# Patient Record
Sex: FEMALE | Race: BLACK OR AFRICAN AMERICAN | NOT HISPANIC OR LATINO | Employment: PART TIME | URBAN - METROPOLITAN AREA
[De-identification: names, ages, dates, MRNs, and addresses within clinical notes are randomized per-mention and may not be internally consistent; named-entity substitution may affect disease eponyms.]

---

## 2018-03-25 ENCOUNTER — HOSPITAL ENCOUNTER (EMERGENCY)
Facility: OTHER | Age: 58
Discharge: HOME | End: 2018-03-25
Attending: EMERGENCY MEDICINE
Payer: MEDICAID

## 2018-03-25 VITALS
DIASTOLIC BLOOD PRESSURE: 88 MMHG | HEART RATE: 112 BPM | TEMPERATURE: 99 F | OXYGEN SATURATION: 98 % | HEIGHT: 62 IN | RESPIRATION RATE: 17 BRPM | BODY MASS INDEX: 36.25 KG/M2 | WEIGHT: 197 LBS | SYSTOLIC BLOOD PRESSURE: 151 MMHG

## 2018-03-25 DIAGNOSIS — L03.115 CELLULITIS OF RIGHT FOOT: Primary | ICD-10-CM

## 2018-03-25 DIAGNOSIS — B35.3 TINEA PEDIS, UNSPECIFIED LATERALITY: ICD-10-CM

## 2018-03-25 DIAGNOSIS — I10 HYPERTENSION, UNSPECIFIED TYPE: ICD-10-CM

## 2018-03-25 DIAGNOSIS — L53.9 REDNESS: ICD-10-CM

## 2018-03-25 DIAGNOSIS — R73.9 HYPERGLYCEMIA: ICD-10-CM

## 2018-03-25 LAB
ALBUMIN SERPL-MCNC: 3.4 G/DL (ref 3.3–5.5)
ALP SERPL-CCNC: 86 U/L (ref 42–141)
BASOPHILS # BLD AUTO: 0.06 K/UL
BASOPHILS NFR BLD: 0.5 %
BILIRUB SERPL-MCNC: 0.4 MG/DL (ref 0.2–1.6)
BUN SERPL-MCNC: 10 MG/DL (ref 7–22)
CALCIUM SERPL-MCNC: 9.3 MG/DL (ref 8–10.3)
CHLORIDE SERPL-SCNC: 104 MMOL/L (ref 98–108)
CREAT SERPL-MCNC: 0.9 MG/DL (ref 0.6–1.2)
DIFFERENTIAL METHOD: ABNORMAL
EOSINOPHIL # BLD AUTO: 0.3 K/UL
EOSINOPHIL NFR BLD: 2.8 %
ERYTHROCYTE [DISTWIDTH] IN BLOOD BY AUTOMATED COUNT: 13.9 %
GLUCOSE SERPL-MCNC: 183 MG/DL (ref 73–118)
HCT VFR BLD AUTO: 33.3 %
HGB BLD-MCNC: 10.9 G/DL
LYMPHOCYTES # BLD AUTO: 0.7 K/UL
LYMPHOCYTES NFR BLD: 6.7 %
MCH RBC QN AUTO: 29.1 PG
MCHC RBC AUTO-ENTMCNC: 32.7 G/DL
MCV RBC AUTO: 89 FL
MONOCYTES # BLD AUTO: 0.8 K/UL
MONOCYTES NFR BLD: 7.3 %
NEUTROPHILS # BLD AUTO: 9.1 K/UL
NEUTROPHILS NFR BLD: 82.5 %
PLATELET # BLD AUTO: 417 K/UL
PMV BLD AUTO: 9.2 FL
POC ALT (SGPT): 21 U/L (ref 10–47)
POC AST (SGOT): 30 U/L (ref 11–38)
POC TCO2: 25 MMOL/L (ref 18–33)
POTASSIUM BLD-SCNC: 3.7 MMOL/L (ref 3.6–5.1)
PROTEIN, POC: 7.7 G/DL (ref 6.4–8.1)
RBC # BLD AUTO: 3.74 M/UL
SODIUM BLD-SCNC: 144 MMOL/L (ref 128–145)
URATE SERPL-MCNC: 5 MG/DL
WBC # BLD AUTO: 11 K/UL

## 2018-03-25 PROCEDURE — 80053 COMPREHEN METABOLIC PANEL: CPT

## 2018-03-25 PROCEDURE — 85025 COMPLETE CBC W/AUTO DIFF WBC: CPT

## 2018-03-25 PROCEDURE — 99284 EMERGENCY DEPT VISIT MOD MDM: CPT | Mod: 25

## 2018-03-25 PROCEDURE — S0077 INJECTION, CLINDAMYCIN PHOSP: HCPCS | Performed by: EMERGENCY MEDICINE

## 2018-03-25 PROCEDURE — 63600175 PHARM REV CODE 636 W HCPCS: Performed by: EMERGENCY MEDICINE

## 2018-03-25 PROCEDURE — 96365 THER/PROPH/DIAG IV INF INIT: CPT

## 2018-03-25 PROCEDURE — 96361 HYDRATE IV INFUSION ADD-ON: CPT

## 2018-03-25 PROCEDURE — 25000003 PHARM REV CODE 250: Performed by: EMERGENCY MEDICINE

## 2018-03-25 PROCEDURE — 96375 TX/PRO/DX INJ NEW DRUG ADDON: CPT

## 2018-03-25 PROCEDURE — 84550 ASSAY OF BLOOD/URIC ACID: CPT

## 2018-03-25 RX ORDER — INDOMETHACIN 25 MG/1
50 CAPSULE ORAL
Qty: 30 CAPSULE | Refills: 0 | Status: SHIPPED | OUTPATIENT
Start: 2018-03-25 | End: 2020-12-07

## 2018-03-25 RX ORDER — CLINDAMYCIN PHOSPHATE 900 MG/50ML
900 INJECTION, SOLUTION INTRAVENOUS
Status: COMPLETED | OUTPATIENT
Start: 2018-03-25 | End: 2018-03-25

## 2018-03-25 RX ORDER — HYDROCODONE BITARTRATE AND ACETAMINOPHEN 5; 325 MG/1; MG/1
1 TABLET ORAL EVERY 4 HOURS PRN
Qty: 10 TABLET | Refills: 0 | Status: SHIPPED | OUTPATIENT
Start: 2018-03-25 | End: 2018-04-04

## 2018-03-25 RX ORDER — CLINDAMYCIN HYDROCHLORIDE 300 MG/1
300 CAPSULE ORAL 3 TIMES DAILY
Qty: 30 CAPSULE | Refills: 0 | Status: SHIPPED | OUTPATIENT
Start: 2018-03-25 | End: 2021-03-30

## 2018-03-25 RX ORDER — KETOCONAZOLE 20 MG/G
CREAM TOPICAL 2 TIMES DAILY
Qty: 30 G | Refills: 1 | Status: SHIPPED | OUTPATIENT
Start: 2018-03-25 | End: 2021-06-16

## 2018-03-25 RX ORDER — KETOROLAC TROMETHAMINE 30 MG/ML
30 INJECTION, SOLUTION INTRAMUSCULAR; INTRAVENOUS
Status: COMPLETED | OUTPATIENT
Start: 2018-03-25 | End: 2018-03-25

## 2018-03-25 RX ADMIN — CLINDAMYCIN IN 5 PERCENT DEXTROSE 900 MG: 18 INJECTION, SOLUTION INTRAVENOUS at 08:03

## 2018-03-25 RX ADMIN — SODIUM CHLORIDE 1000 ML: 0.9 INJECTION, SOLUTION INTRAVENOUS at 08:03

## 2018-03-25 RX ADMIN — KETOROLAC TROMETHAMINE 30 MG: 30 INJECTION, SOLUTION INTRAMUSCULAR at 08:03

## 2018-03-25 NOTE — ED NOTES
Pt reports R big toe infection, pain, swelling.  Skin breakdown almost blisterlike noted to R big toe.

## 2018-03-25 NOTE — ED PROVIDER NOTES
Encounter Date: 3/25/2018       History     Chief Complaint   Patient presents with    Foot Problem     pt report reddness ans swelling to right great toe since fri, denies injury     CC: right foot pain and redness  57-year-old noted redness and swelling to the dorsum of her right foot 2 days ago.  She denies trauma.  She has 10 out of 10 pain.  She's been taking ibuprofen without improvement.  Patient denies fever.  She has a history of arthritis reports chronic pain to her right shoulder.   The pain worsens when she walks and she is limping      The history is provided by the patient.     Review of patient's allergies indicates:  No Known Allergies  Past Medical History:   Diagnosis Date    Arthritis     High cholesterol     Hypertension      Past Surgical History:   Procedure Laterality Date    c cestion      KNEE ARTHROPLASTY       History reviewed. No pertinent family history.  Social History   Substance Use Topics    Smoking status: Current Every Day Smoker     Packs/day: 0.50    Smokeless tobacco: Never Used    Alcohol use Not on file     Review of Systems   Constitutional: Negative for fever.   Respiratory: Negative for shortness of breath.    Cardiovascular: Negative for chest pain.   Musculoskeletal: Positive for arthralgias (right shoulder), gait problem and joint swelling.   Skin: Positive for color change.   Neurological: Negative for weakness and numbness.       Physical Exam     Initial Vitals [03/25/18 0723]   BP Pulse Resp Temp SpO2   (!) 195/95 (!) 120 20 99.1 °F (37.3 °C) 99 %      MAP       128.33         Physical Exam    Nursing note and vitals reviewed.  Constitutional: She appears well-developed and well-nourished.   HENT:   Head: Normocephalic and atraumatic.   Eyes: Conjunctivae and EOM are normal. Pupils are equal, round, and reactive to light.   Neck: Normal range of motion. Neck supple.   Cardiovascular: Regular rhythm. Tachycardia present.    Pulses:       Posterior tibial  pulses are 3+ on the right side.   Pulmonary/Chest: Breath sounds normal.   Abdominal: Soft. There is no tenderness. There is no rebound and no guarding.   Musculoskeletal: Normal range of motion.        Right shoulder: She exhibits normal range of motion, normal pulse and normal strength.        Arms:       Feet:    Onychomycosis right great toe   Neurological: She is alert and oriented to person, place, and time. She has normal strength.   Skin: Skin is warm and dry.   Psychiatric: She has a normal mood and affect.         ED Course   Procedures  Labs Reviewed   POCT CMP - Abnormal; Notable for the following:        Result Value    POC Glucose 183 (*)     All other components within normal limits   URIC ACID   CBC W/ AUTO DIFFERENTIAL   POCT CBC   POCT CMP             Medical Decision Making:   Initial Assessment:   57-year-old presents with redness and swelling to the dorsum of her right foot.  On exam patient does have redness, tenderness and edema to the foot.  She also has evidence of athlete's foot between her Tays.  She has onychomycosis to the right great toe.  She has 2+ dorsalis pedis  Differential Diagnosis:   Gout, cellulitis, tinea pedis with superimposed bacterial infection  ED Management:  CBC: Normal white blood cell count, blood sugar 183 (unknown diabetic.)  Patient admits that she is on prednisone for arthritis.  This is likely the etiology of the hyperglycemia X-ray shows no acute findings.  Patient was given clindamycin IV in the ED.  She was also given Toradol.  Patient will be discharged on clindamycin, Nizoral  cream and hydrocodone.  She will follow up with podiatry.  Uric acid was normal                      Clinical Impression:   The primary encounter diagnosis was Cellulitis of right foot. Diagnoses of Redness, Hypertension, unspecified type, Hyperglycemia, and Tinea pedis, unspecified laterality were also pertinent to this visit.                           Tabatha Neumann MD  03/25/18  1917

## 2018-05-18 ENCOUNTER — TELEPHONE (OUTPATIENT)
Dept: ORTHOPEDICS | Facility: CLINIC | Age: 58
End: 2018-05-18

## 2018-05-18 NOTE — TELEPHONE ENCOUNTER
Spoke with patient to schedule appointment. Patient was made aware of date, time, and location. Verbalized understanding.

## 2018-05-21 ENCOUNTER — HOSPITAL ENCOUNTER (EMERGENCY)
Facility: HOSPITAL | Age: 58
Discharge: HOME | End: 2018-05-21
Attending: EMERGENCY MEDICINE
Payer: MEDICAID

## 2018-05-21 VITALS
TEMPERATURE: 99 F | WEIGHT: 192 LBS | BODY MASS INDEX: 36.25 KG/M2 | SYSTOLIC BLOOD PRESSURE: 161 MMHG | DIASTOLIC BLOOD PRESSURE: 85 MMHG | HEART RATE: 99 BPM | HEIGHT: 61 IN | OXYGEN SATURATION: 99 % | RESPIRATION RATE: 18 BRPM

## 2018-05-21 DIAGNOSIS — R52 PAIN: ICD-10-CM

## 2018-05-21 DIAGNOSIS — M17.11 OSTEOARTHRITIS OF RIGHT KNEE, UNSPECIFIED OSTEOARTHRITIS TYPE: Primary | ICD-10-CM

## 2018-05-21 LAB — POCT GLUCOSE: 115 MG/DL (ref 70–110)

## 2018-05-21 PROCEDURE — 63600175 PHARM REV CODE 636 W HCPCS: Performed by: EMERGENCY MEDICINE

## 2018-05-21 PROCEDURE — 96372 THER/PROPH/DIAG INJ SC/IM: CPT

## 2018-05-21 PROCEDURE — 99284 EMERGENCY DEPT VISIT MOD MDM: CPT | Mod: 25

## 2018-05-21 RX ORDER — DICLOFENAC SODIUM 10 MG/G
2 GEL TOPICAL 4 TIMES DAILY
Qty: 1 TUBE | Refills: 0 | Status: SHIPPED | OUTPATIENT
Start: 2018-05-21 | End: 2018-06-23

## 2018-05-21 RX ORDER — KETOROLAC TROMETHAMINE 30 MG/ML
30 INJECTION, SOLUTION INTRAMUSCULAR; INTRAVENOUS
Status: COMPLETED | OUTPATIENT
Start: 2018-05-21 | End: 2018-05-21

## 2018-05-21 RX ORDER — NAPROXEN 375 MG/1
375 TABLET ORAL 2 TIMES DAILY PRN
Qty: 20 TABLET | Refills: 0 | Status: SHIPPED | OUTPATIENT
Start: 2018-05-21 | End: 2018-05-26

## 2018-05-21 RX ADMIN — KETOROLAC TROMETHAMINE 30 MG: 30 INJECTION INTRAMUSCULAR; INTRAVENOUS at 02:05

## 2018-05-21 NOTE — ED PROVIDER NOTES
Encounter Date: 5/21/2018       History     Chief Complaint   Patient presents with    Right knee     Hx of arthritis. Symptoms x 1 week     49-year-old female presents with achy right knee pain. Patient states the pain has been getting much worse over the last week.  Patient has been taking ibuprofen at night.  Patient states pain does not radiate.  Patient states she was diagnosed with arthritis of her knee 6 months ago.  She is awaiting orthopedic evaluation in 1 month.  Patient states the pain is waking her at night.  Ibuprofen is not helping.  Patient denies any new injuries.  Patient denies numbness tingling weakness.          Review of patient's allergies indicates:  No Known Allergies  Past Medical History:   Diagnosis Date    Arthritis     Diabetes mellitus     High cholesterol     Hypertension      Past Surgical History:   Procedure Laterality Date    c cestion      KNEE ARTHROPLASTY       History reviewed. No pertinent family history.  Social History   Substance Use Topics    Smoking status: Current Every Day Smoker     Packs/day: 0.50    Smokeless tobacco: Never Used    Alcohol use Not on file     Review of Systems   Constitutional: Negative for fever.   HENT: Negative for sore throat.    Respiratory: Negative for shortness of breath.    Cardiovascular: Negative for chest pain.   Gastrointestinal: Negative for nausea.   Genitourinary: Negative for dysuria.   Musculoskeletal: Positive for arthralgias. Negative for back pain.   Skin: Negative for rash.   Neurological: Negative for weakness.   Hematological: Does not bruise/bleed easily.   All other systems reviewed and are negative.      Physical Exam     Initial Vitals [05/21/18 1414]   BP Pulse Resp Temp SpO2   (!) 161/85 99 18 98.5 °F (36.9 °C) 99 %      MAP       110.33         Physical Exam    Nursing note and vitals reviewed.  Constitutional: She appears well-developed and well-nourished.   HENT:   Head: Normocephalic and atraumatic.    Right Ear: External ear normal.   Left Ear: External ear normal.   Nose: Nose normal.   Eyes: Conjunctivae and EOM are normal. Pupils are equal, round, and reactive to light.   Neck: Normal range of motion. Neck supple.   Cardiovascular: Normal rate, regular rhythm and intact distal pulses.   Pulmonary/Chest: Breath sounds normal. No respiratory distress.   Musculoskeletal: She exhibits tenderness. She exhibits no edema.        Right hip: Normal. She exhibits normal range of motion, normal strength, no tenderness and no bony tenderness.        Left hip: Normal. She exhibits normal range of motion, normal strength, no tenderness and no bony tenderness.        Right knee: She exhibits normal range of motion, no swelling, no effusion, no ecchymosis and no deformity. Tenderness found. Medial joint line tenderness noted. No lateral joint line tenderness noted.        Left knee: Normal. She exhibits normal range of motion and no swelling. No tenderness found. No medial joint line and no lateral joint line tenderness noted.        Right ankle: Normal. She exhibits normal range of motion and no swelling. No tenderness. No lateral malleolus and no medial malleolus tenderness found.        Left ankle: She exhibits normal range of motion and no swelling. No tenderness. No lateral malleolus and no medial malleolus tenderness found.        Legs:  Neurological: She is alert and oriented to person, place, and time. She has normal strength. She displays normal reflexes. No sensory deficit.   Skin: Skin is warm and dry. Capillary refill takes less than 2 seconds. No erythema.   Psychiatric: She has a normal mood and affect.         ED Course   Procedures  Labs Reviewed   POCT GLUCOSE - Abnormal; Notable for the following:        Result Value    POCT Glucose 115 (*)     All other components within normal limits                             Medical decision making   Chief complaint:  Right knee pain getting worse for the last 6  months  Differential diagnosis:  Arthritis, strain, sprain, and fracture  Treatment in the ED Physical Exam, Toradol for pain.  Patient reports decreased pain after medication.    Discussed  imaging results.    Fill and take prescriptions as directed.  Return to the ED if symptoms worsen or do not resolve.   Answered questions and discussed discharge plan.    Patient feels much better and is ready for discharge.  Follow up with PCP in 1 days.       Clinical Impression:   The primary encounter diagnosis was Osteoarthritis of right knee, unspecified osteoarthritis type. A diagnosis of Pain was also pertinent to this visit.                           Emily Quinn DO  05/21/18 [id number]

## 2018-06-23 ENCOUNTER — HOSPITAL ENCOUNTER (EMERGENCY)
Facility: HOSPITAL | Age: 58
Discharge: HOME | End: 2018-06-23
Attending: EMERGENCY MEDICINE
Payer: MEDICAID

## 2018-06-23 VITALS
TEMPERATURE: 98 F | SYSTOLIC BLOOD PRESSURE: 176 MMHG | HEIGHT: 61 IN | OXYGEN SATURATION: 95 % | HEART RATE: 95 BPM | WEIGHT: 195 LBS | DIASTOLIC BLOOD PRESSURE: 97 MMHG | RESPIRATION RATE: 19 BRPM | BODY MASS INDEX: 36.82 KG/M2

## 2018-06-23 DIAGNOSIS — M25.561 CHRONIC PAIN OF BOTH KNEES: Primary | ICD-10-CM

## 2018-06-23 DIAGNOSIS — G89.29 CHRONIC PAIN OF BOTH KNEES: Primary | ICD-10-CM

## 2018-06-23 DIAGNOSIS — M25.562 CHRONIC PAIN OF BOTH KNEES: Primary | ICD-10-CM

## 2018-06-23 LAB — GLUCOSE SERPL-MCNC: 108 MG/DL (ref 70–110)

## 2018-06-23 PROCEDURE — 99284 EMERGENCY DEPT VISIT MOD MDM: CPT | Mod: 25

## 2018-06-23 PROCEDURE — 63600175 PHARM REV CODE 636 W HCPCS: Performed by: EMERGENCY MEDICINE

## 2018-06-23 PROCEDURE — 96372 THER/PROPH/DIAG INJ SC/IM: CPT

## 2018-06-23 RX ORDER — METHOCARBAMOL 750 MG/1
1500 TABLET, FILM COATED ORAL 3 TIMES DAILY
Qty: 30 TABLET | Refills: 0 | Status: SHIPPED | OUTPATIENT
Start: 2018-06-23 | End: 2018-06-28

## 2018-06-23 RX ORDER — NAPROXEN 375 MG/1
375 TABLET ORAL 2 TIMES DAILY PRN
Qty: 20 TABLET | Refills: 0 | Status: SHIPPED | OUTPATIENT
Start: 2018-06-23 | End: 2018-06-28

## 2018-06-23 RX ORDER — KETOROLAC TROMETHAMINE 30 MG/ML
30 INJECTION, SOLUTION INTRAMUSCULAR; INTRAVENOUS
Status: COMPLETED | OUTPATIENT
Start: 2018-06-23 | End: 2018-06-23

## 2018-06-23 RX ORDER — DICLOFENAC SODIUM 10 MG/G
2 GEL TOPICAL 4 TIMES DAILY
Qty: 1 TUBE | Refills: 0 | OUTPATIENT
Start: 2018-06-23 | End: 2020-09-27

## 2018-06-23 RX ADMIN — KETOROLAC TROMETHAMINE 30 MG: 30 INJECTION, SOLUTION INTRAMUSCULAR at 07:06

## 2018-06-23 NOTE — ED PROVIDER NOTES
Encounter Date: 6/23/2018       History     Chief Complaint   Patient presents with    Knee Pain     Pt reports bilateral knee pain x months, reports taking 4 800mg ibuprofen w/o relief, denies new injury     49-year-old female chief complaint bilateral knee pain. Patient states it is an achy pain worse in the right knee.  Patient states she has had these symptoms on and off for quite awhile but getting worse over last 4 months.  Patient did have arthroplasty on her knees years ago.  Patient states pain is worse with movement and walking.  Patient denies any new injuries.  Patient last took ibuprofen last night.  Patient has taken nothing for pain today.          Review of patient's allergies indicates:  No Known Allergies  Past Medical History:   Diagnosis Date    Arthritis     Diabetes mellitus     High cholesterol     Hypertension      Past Surgical History:   Procedure Laterality Date    c cestion      KNEE ARTHROPLASTY       No family history on file.  Social History   Substance Use Topics    Smoking status: Current Every Day Smoker     Packs/day: 0.50    Smokeless tobacco: Never Used    Alcohol use Not on file     Review of Systems   Constitutional: Negative for fever.   HENT: Negative for sore throat.    Respiratory: Negative for shortness of breath.    Cardiovascular: Negative for chest pain.   Gastrointestinal: Negative for nausea.   Genitourinary: Negative for dysuria.   Musculoskeletal: Positive for arthralgias. Negative for back pain.   Skin: Negative for rash.   Neurological: Negative for weakness.   Hematological: Does not bruise/bleed easily.   All other systems reviewed and are negative.      Physical Exam     Initial Vitals [06/23/18 0721]   BP Pulse Resp Temp SpO2   (!) 175/106 99 19 98.2 °F (36.8 °C) 97 %      MAP       --         Physical Exam    Nursing note and vitals reviewed.  Constitutional: She appears well-developed and well-nourished.   HENT:   Head: Normocephalic and  atraumatic.   Right Ear: External ear normal.   Left Ear: External ear normal.   Nose: Nose normal.   Eyes: Conjunctivae and EOM are normal. Pupils are equal, round, and reactive to light.   Neck: Normal range of motion. Neck supple.   Cardiovascular: Normal rate, regular rhythm and intact distal pulses.   Pulmonary/Chest: Breath sounds normal. No respiratory distress.   Musculoskeletal: Normal range of motion. She exhibits tenderness. She exhibits no edema.        Right knee: Normal. She exhibits normal range of motion, no swelling, no effusion and no ecchymosis. No tenderness found. No medial joint line and no lateral joint line tenderness noted.        Left knee: She exhibits no laceration. No tenderness found. No medial joint line and no lateral joint line tenderness noted.   Crepitus appreciated on bilateral knees.  No point tenderness. No deformity appreciated   Neurological: She is alert and oriented to person, place, and time. She has normal strength. No sensory deficit.   Skin: Skin is warm and dry. Capillary refill takes less than 2 seconds. No erythema.   Psychiatric: She has a normal mood and affect.         ED Course   Procedures  Labs Reviewed   POCT GLUCOSE          Imaging Results    None                          Medical decision making   Chief complaint:  Chronic knee pain  Differential diagnosis:  Arthritis, knee pain, and chronic pain  Treatment in the ED Physical Exam, Toradol for pain.  Patient reports decreased pain after medication.    Discussed outpatient treatment plan    Fill and take prescriptions as directed.  Return to the ED if symptoms worsen or do not resolve.   Answered questions and discussed discharge plan.    Patient feels much better and is ready for discharge.  Follow up with PCP in 1 days.       Clinical Impression:   The encounter diagnosis was Chronic pain of both knees.                             Emily Quinn DO  06/23/18 0734

## 2018-07-20 ENCOUNTER — HOSPITAL ENCOUNTER (EMERGENCY)
Facility: HOSPITAL | Age: 58
Discharge: HOME | End: 2018-07-20
Attending: EMERGENCY MEDICINE
Payer: MEDICAID

## 2018-07-20 VITALS
WEIGHT: 195 LBS | RESPIRATION RATE: 20 BRPM | HEART RATE: 78 BPM | OXYGEN SATURATION: 100 % | BODY MASS INDEX: 36.82 KG/M2 | TEMPERATURE: 98 F | SYSTOLIC BLOOD PRESSURE: 145 MMHG | DIASTOLIC BLOOD PRESSURE: 57 MMHG | HEIGHT: 61 IN

## 2018-07-20 DIAGNOSIS — M79.89 RIGHT LEG SWELLING: ICD-10-CM

## 2018-07-20 LAB — GLUCOSE SERPL-MCNC: 134 MG/DL (ref 70–110)

## 2018-07-20 PROCEDURE — 82962 GLUCOSE BLOOD TEST: CPT

## 2018-07-20 PROCEDURE — 99284 EMERGENCY DEPT VISIT MOD MDM: CPT | Mod: 25

## 2018-07-20 RX ORDER — KETOROLAC TROMETHAMINE 10 MG/1
10 TABLET, FILM COATED ORAL
Status: DISCONTINUED | OUTPATIENT
Start: 2018-07-20 | End: 2018-07-20 | Stop reason: HOSPADM

## 2018-07-20 RX ORDER — KETOROLAC TROMETHAMINE 10 MG/1
10 TABLET, FILM COATED ORAL EVERY 6 HOURS
Qty: 20 TABLET | Refills: 0 | Status: SHIPPED | OUTPATIENT
Start: 2018-07-20 | End: 2020-12-07

## 2018-07-20 NOTE — ED PROVIDER NOTES
Encounter Date: 7/20/2018       History     Chief Complaint   Patient presents with    Leg Pain     pt reports right lower leg pain/ swelling since yesterday, denies injury     Chief complaint:  Leg swelling  57-year-old awoke yesterday with swelling to her right lower leg.  The pain is throbbing and worsens when she walks.  She denies trauma or unusual activities. No history of similar problems.  Patient has not taken anything for the pain. She denies weakness or numbness to the leg.  She rates her pain as 9/10 with walking.  She denies chest pain or shortness of breath. No recent travel surgeries or immobilizations.          Review of patient's allergies indicates:  No Known Allergies  Past Medical History:   Diagnosis Date    Arthritis     Diabetes mellitus     High cholesterol     Hypertension      Past Surgical History:   Procedure Laterality Date    c cestion      KNEE ARTHROPLASTY       History reviewed. No pertinent family history.  Social History   Substance Use Topics    Smoking status: Current Every Day Smoker     Packs/day: 0.50    Smokeless tobacco: Never Used    Alcohol use Not on file     Review of Systems   Respiratory: Negative for shortness of breath.    Cardiovascular: Positive for leg swelling. Negative for chest pain.   Musculoskeletal:        Right leg swelling   Skin: Negative for color change.   Neurological: Negative for weakness and numbness.   All other systems reviewed and are negative.      Physical Exam     Initial Vitals [07/20/18 0725]   BP Pulse Resp Temp SpO2   (!) 150/83 88 18 98.2 °F (36.8 °C) 98 %      MAP       --         Physical Exam    Nursing note and vitals reviewed.  Constitutional: She appears well-developed and well-nourished.   HENT:   Head: Normocephalic and atraumatic.   Eyes: Conjunctivae and EOM are normal. Pupils are equal, round, and reactive to light.   Neck: Normal range of motion. Neck supple.   Cardiovascular: Normal rate, regular rhythm and intact  distal pulses.   Pulmonary/Chest: Breath sounds normal. No respiratory distress.   Abdominal: Soft. There is no tenderness. There is no rebound and no guarding.   Musculoskeletal: Normal range of motion. She exhibits edema ( right calf, no warmth or erythema).   Neurological: She is alert and oriented to person, place, and time. She has normal strength. No sensory deficit.   Skin: Skin is warm and dry.   Psychiatric: She has a normal mood and affect.         ED Course   Procedures  Labs Reviewed   POCT GLUCOSE - Abnormal; Notable for the following:        Result Value    POC Glucose 134 (*)     All other components within normal limits          Imaging Results    None          Medical Decision Making:   Initial Assessment:   57-year-old lady who complains of swelling to her right lower extremity without trauma.  The symptoms began yesterday and have been constant.  The pain and swelling worsens with ambulation patient denies recent surgeries or travel.  On exam she does have swelling to the lateral aspect of her right lower extremity without color change.  No neurovascular deficits  ED Management:  Ultrasound will be done to rule out DVT.  The  pain is nontraumatic.  Etiology is unclear at this time.  Ultrasound shows no evidence of DVT.  The radiology's seems to think that she may have ruptured a Baker cyst which could be the etiology of the swelling.  Patient be treated with Toradol.  She was provided with an ice pack was advised elevate her leg today.                      Clinical Impression:   The encounter diagnosis was Right leg swelling.                             Tabatha Neumann MD  07/20/18 1023

## 2018-09-17 ENCOUNTER — HOSPITAL ENCOUNTER (EMERGENCY)
Facility: HOSPITAL | Age: 58
Discharge: HOME | End: 2018-09-17
Attending: EMERGENCY MEDICINE
Payer: MEDICAID

## 2018-09-17 VITALS
DIASTOLIC BLOOD PRESSURE: 77 MMHG | SYSTOLIC BLOOD PRESSURE: 142 MMHG | HEIGHT: 61 IN | WEIGHT: 188 LBS | HEART RATE: 79 BPM | RESPIRATION RATE: 20 BRPM | OXYGEN SATURATION: 98 % | BODY MASS INDEX: 35.5 KG/M2 | TEMPERATURE: 98 F

## 2018-09-17 DIAGNOSIS — R05.9 COUGH: ICD-10-CM

## 2018-09-17 DIAGNOSIS — J18.9 COMMUNITY ACQUIRED PNEUMONIA, UNSPECIFIED LATERALITY: Primary | ICD-10-CM

## 2018-09-17 LAB — POCT GLUCOSE: 116 MG/DL (ref 70–110)

## 2018-09-17 PROCEDURE — 99284 EMERGENCY DEPT VISIT MOD MDM: CPT | Mod: 25

## 2018-09-17 PROCEDURE — 94640 AIRWAY INHALATION TREATMENT: CPT

## 2018-09-17 PROCEDURE — 63600175 PHARM REV CODE 636 W HCPCS: Performed by: EMERGENCY MEDICINE

## 2018-09-17 PROCEDURE — 96372 THER/PROPH/DIAG INJ SC/IM: CPT

## 2018-09-17 PROCEDURE — 25000242 PHARM REV CODE 250 ALT 637 W/ HCPCS

## 2018-09-17 RX ORDER — IPRATROPIUM BROMIDE AND ALBUTEROL SULFATE 2.5; .5 MG/3ML; MG/3ML
3 SOLUTION RESPIRATORY (INHALATION) ONCE
Status: COMPLETED | OUTPATIENT
Start: 2018-09-17 | End: 2018-09-17

## 2018-09-17 RX ORDER — ALBUTEROL SULFATE 90 UG/1
1-2 AEROSOL, METERED RESPIRATORY (INHALATION) EVERY 6 HOURS PRN
Qty: 1 INHALER | Refills: 0 | Status: SHIPPED | OUTPATIENT
Start: 2018-09-17 | End: 2021-06-16

## 2018-09-17 RX ORDER — DOXYCYCLINE 100 MG/1
100 CAPSULE ORAL 2 TIMES DAILY
Qty: 20 CAPSULE | Refills: 0 | Status: SHIPPED | OUTPATIENT
Start: 2018-09-17 | End: 2018-09-27

## 2018-09-17 RX ORDER — DEXAMETHASONE SODIUM PHOSPHATE 4 MG/ML
6 INJECTION, SOLUTION INTRA-ARTICULAR; INTRALESIONAL; INTRAMUSCULAR; INTRAVENOUS; SOFT TISSUE ONCE
Status: COMPLETED | OUTPATIENT
Start: 2018-09-17 | End: 2018-09-17

## 2018-09-17 RX ORDER — IPRATROPIUM BROMIDE AND ALBUTEROL SULFATE 2.5; .5 MG/3ML; MG/3ML
SOLUTION RESPIRATORY (INHALATION)
Status: COMPLETED
Start: 2018-09-17 | End: 2018-09-17

## 2018-09-17 RX ADMIN — IPRATROPIUM BROMIDE AND ALBUTEROL SULFATE 3 ML: .5; 2.5 SOLUTION RESPIRATORY (INHALATION) at 09:09

## 2018-09-17 RX ADMIN — IPRATROPIUM BROMIDE AND ALBUTEROL SULFATE 3 ML: 2.5; .5 SOLUTION RESPIRATORY (INHALATION) at 09:09

## 2018-09-17 RX ADMIN — DEXAMETHASONE SODIUM PHOSPHATE 6 MG: 4 INJECTION, SOLUTION INTRAMUSCULAR; INTRAVENOUS at 09:09

## 2018-09-17 NOTE — ED PROVIDER NOTES
"EM PHYSICIAN NOTE    HPI  This patient presents with a complaint of   Chief Complaint   Patient presents with    Cough     Pt states," Cough with congestion for two days."       HPI:  This is a 57-year-old woman who presents to the emergency department with a complaint of 2 days of cough and congestion.  She has not tried any over-the-counter medication she is not on home nebulizers but she is a smoker.  Patient has history of diabetes and hypertension.  Patient has had subjective chills.  There has been no vomiting but she has had some diarrhea yesterday.    REVIEW of PMH, SOC History and Family History:  Past Medical History:   Diagnosis Date    Arthritis     Diabetes mellitus     High cholesterol     Hypertension      Past Surgical History:   Procedure Laterality Date    c cestion      KNEE ARTHROPLASTY       Social History     Socioeconomic History    Marital status: Single     Spouse name: None    Number of children: None    Years of education: None    Highest education level: None   Social Needs    Financial resource strain: None    Food insecurity - worry: None    Food insecurity - inability: None    Transportation needs - medical: None    Transportation needs - non-medical: None   Occupational History    None   Tobacco Use    Smoking status: Current Every Day Smoker     Packs/day: 0.50    Smokeless tobacco: Never Used   Substance and Sexual Activity    Alcohol use: None    Drug use: None    Sexual activity: None   Other Topics Concern    None   Social History Narrative    None     There is no problem list on file for this patient.    No current facility-administered medications for this encounter.      Current Outpatient Medications   Medication Sig Dispense Refill    clindamycin (CLEOCIN) 300 MG capsule Take 1 capsule (300 mg total) by mouth 3 (three) times daily. 30 capsule 0    diclofenac sodium (VOLTAREN) 1 % Gel Apply 2 g topically 4 (four) times daily. for 10 days 1 Tube 0    " "indomethacin (INDOCIN) 25 MG capsule Take 2 capsules (50 mg total) by mouth 3 (three) times daily with meals. 30 capsule 0    ketoconazole (NIZORAL) 2 % cream Apply topically 2 (two) times daily. 30 g 1    ketorolac (TORADOL) 10 mg tablet Take 1 tablet (10 mg total) by mouth every 6 (six) hours. 20 tablet 0     Review of patient's allergies indicates:  No Known Allergies    There is no immunization history on file for this patient.  History reviewed. No pertinent family history.    REVIEW of SYSTEMS  Source:  Patient  The nurse's notes and triage vital signs were reviewed.  GENERAL/CONSTITUTIONAL: There is no report of fever, weakness, or unexplained weight loss.  CARDIOVASCULAR: There is no report of chest pain   RESPIRATORY: There has been cough and increased green sputum production  GASTROINTESTINAL: There is no report of nausea  MUSCULOSKELETAL: There is no report of joint or muscle pain. No muscle weakness or tenderness. There has been no leg pain. There has been no peripheral edema  SKIN AND BREASTS: There is no report of easy bruising, skin redness, skin rash.  HEMATOLOGIC/LYMPHATIC: There is no report of anemia, bleeding or clotting defects. There is no report of anticoagulant use.  The remainder of the ROS is negative.    PHYSICAL EXAMINATION    ED Triage Vitals [09/17/18 0747]   Enc Vitals Group      BP (!) 150/81      Pulse 88      Resp 16      Temp 98.1 °F (36.7 °C)      Temp src Oral      SpO2 99 %      Weight 188 lb      Height 5' 1"      Head Circumference       Peak Flow       Pain Score       Pain Loc       Pain Edu?       Excl. in GC?      Vital signs and Pulse Ox reviewed in clinical context. Abnormalities noted: HTN noted and discussed need for close followup with primary care physician.    Pt's level of consciousness is alert, and the patient is in mild distress.  Skin: warm, pink and dry  Mucosa:moist  Head and Neck: There is mild erythema of the pharynx.  There is no uvula shift.  There is " no drooling.  There is no exudate.  There is no trismus.  Cardiac exam: RRR  Pulmonary exam: + tight with prolonged expiratory phase  Abd Exam: soft and nontender  Musculoskeletal: no joint tenderness, deformity or swelling   Neurologic: GCS 15. 5 over 5 strength, normal gait, cranial nerves intact, neck supple     Medical decision making: History obtained from family  Impression:  Rule out pneumonia, bronchitis  Plan:  Nebulizer, chest x-ray, reassess  Micelle JOSE ANGEL Monson MD, 8:54 AM 9/17/2018    This note was created using Dictation Software.  This program may occasionally misinterpret certain words and phrases.            10:22 AM  ED Course:    Admission on 09/17/2018   Component Date Value Ref Range Status    POCT Glucose 09/17/2018 116* 70 - 110 mg/dL Final       Imaging Results          X-Ray Chest PA And Lateral (Final result)     Abnormal  Result time 09/17/18 09:42:10    Final result by Shama Tipton MD (09/17/18 09:42:10)                 Impression:      Streaky left lower lobe opacity may represent infection, aspiration and/or atelectasis.    This report was flagged in Epic as abnormal.      Electronically signed by: Shama Tipton  Date:    09/17/2018  Time:    09:42             Narrative:    EXAMINATION:  XR CHEST PA AND LATERAL    CLINICAL HISTORY:  Cough    TECHNIQUE:  PA and lateral views of the chest were performed.    COMPARISON:  None    FINDINGS:  Eventration of the left hemidiaphragm.Streaky left lower lobe opacity.  No pleural effusion or pneumothorax.    Normal cardiomediastinal contour.    No acute or aggressive osseous abnormality. Scoliotic curvature of the thoracolumbar spine, which may be positional.                                  Medications   dexamethasone injection 6 mg (6 mg Intramuscular Given 9/17/18 0919)   albuterol-ipratropium 2.5 mg-0.5 mg/3 mL nebulizer solution 3 mL (3 mLs Nebulization Given 9/17/18 0927)       Temp:  [98.1 °F (36.7 °C)] 98.1 °F (36.7 °C)  Pulse:  [80-88]  80  Resp:  [16-20] 20  SpO2:  [99 %] 99 %  BP: (150)/(81) 150/81    REASSESSMENT:  Improved    IMP:  Community-acquired pneumonia      PLAN:  Antibiotics, MDI, follow up with PCP             Naomi Monson MD  09/17/18 1021

## 2019-06-11 ENCOUNTER — HOSPITAL ENCOUNTER (EMERGENCY)
Facility: HOSPITAL | Age: 59
Discharge: HOME | End: 2019-06-11
Attending: EMERGENCY MEDICINE
Payer: MEDICAID

## 2019-06-11 VITALS
HEIGHT: 61 IN | BODY MASS INDEX: 35.3 KG/M2 | HEART RATE: 92 BPM | DIASTOLIC BLOOD PRESSURE: 96 MMHG | RESPIRATION RATE: 20 BRPM | WEIGHT: 187 LBS | SYSTOLIC BLOOD PRESSURE: 178 MMHG | TEMPERATURE: 98 F | OXYGEN SATURATION: 98 %

## 2019-06-11 DIAGNOSIS — J06.9 UPPER RESPIRATORY TRACT INFECTION, UNSPECIFIED TYPE: ICD-10-CM

## 2019-06-11 DIAGNOSIS — H10.33 ACUTE CONJUNCTIVITIS OF BOTH EYES, UNSPECIFIED ACUTE CONJUNCTIVITIS TYPE: Primary | ICD-10-CM

## 2019-06-11 DIAGNOSIS — J30.2 SEASONAL ALLERGIES: ICD-10-CM

## 2019-06-11 PROCEDURE — 99284 EMERGENCY DEPT VISIT MOD MDM: CPT | Mod: ER

## 2019-06-11 PROCEDURE — 25000003 PHARM REV CODE 250: Mod: ER | Performed by: EMERGENCY MEDICINE

## 2019-06-11 RX ORDER — FLUTICASONE PROPIONATE 50 MCG
1 SPRAY, SUSPENSION (ML) NASAL 2 TIMES DAILY
Qty: 1 BOTTLE | Refills: 0 | OUTPATIENT
Start: 2019-06-11 | End: 2020-03-13

## 2019-06-11 RX ORDER — ERYTHROMYCIN 5 MG/G
OINTMENT OPHTHALMIC
Qty: 1 TUBE | Refills: 0 | Status: SHIPPED | OUTPATIENT
Start: 2019-06-11 | End: 2021-03-30

## 2019-06-11 RX ORDER — LORATADINE 10 MG/1
10 TABLET ORAL DAILY
Qty: 60 TABLET | Refills: 0 | Status: SHIPPED | OUTPATIENT
Start: 2019-06-11 | End: 2021-06-16

## 2019-06-11 RX ORDER — DIPHENHYDRAMINE HCL 25 MG
25 CAPSULE ORAL EVERY 6 HOURS PRN
Qty: 20 CAPSULE | Refills: 0 | OUTPATIENT
Start: 2019-06-11 | End: 2020-03-13

## 2019-06-11 RX ORDER — BENZONATATE 200 MG/1
200 CAPSULE ORAL 3 TIMES DAILY PRN
Qty: 20 CAPSULE | Refills: 0 | Status: SHIPPED | OUTPATIENT
Start: 2019-06-11 | End: 2019-06-21

## 2019-06-11 RX ORDER — ERYTHROMYCIN 5 MG/G
OINTMENT OPHTHALMIC
Status: COMPLETED | OUTPATIENT
Start: 2019-06-11 | End: 2019-06-11

## 2019-06-11 RX ADMIN — ERYTHROMYCIN 1 INCH: 5 OINTMENT OPHTHALMIC at 09:06

## 2019-06-11 NOTE — ED PROVIDER NOTES
Encounter Date: 6/11/2019    SCRIBE #1 NOTE: I, Cristina Avila, am scribing for, and in the presence of,  Dr. Quinn . I have scribed the following portions of the note - Other sections scribed: HPI,ROS,PE .       History     Chief Complaint   Patient presents with    Eye Problem     patient wok eup this morning with red yes and draining with itching and burning    Cough     Patient also complaining of cough and cold for  a week     59 y/o/f with a hx of HTN presents with bilateral red, itchy, draining to eyes that started this morning. Also complaining of green productive cough, congestion for 1 week. Denies visual changes, SOB, CP, fever or N/V/D. Last took nyquil last night without relief. Did not take her HTN meds today.     The history is provided by the patient. No  was used.     Review of patient's allergies indicates:   Allergen Reactions    Neosporin [benzalkonium chloride]      Rash       Past Medical History:   Diagnosis Date    Arthritis     Diabetes mellitus     High cholesterol     Hypertension      Past Surgical History:   Procedure Laterality Date    c cestion      KNEE ARTHROPLASTY       No family history on file.  Social History     Tobacco Use    Smoking status: Current Every Day Smoker     Packs/day: 0.50    Smokeless tobacco: Never Used   Substance Use Topics    Alcohol use: Not on file    Drug use: Not on file     Review of Systems   Constitutional: Negative for fever.   HENT: Positive for congestion. Negative for sore throat.    Eyes: Positive for discharge, redness and itching. Negative for visual disturbance.   Respiratory: Positive for cough. Negative for shortness of breath.    Cardiovascular: Negative for chest pain.   Gastrointestinal: Negative for diarrhea, nausea and vomiting.   Genitourinary: Negative for dysuria.   Musculoskeletal: Negative for back pain.   Skin: Negative for rash.   Neurological: Negative for weakness.   Hematological: Does not  bruise/bleed easily.   All other systems reviewed and are negative.      Physical Exam     Initial Vitals [06/11/19 0856]   BP Pulse Resp Temp SpO2   (!) 178/96 92 20 98.1 °F (36.7 °C) 98 %      MAP       --         Physical Exam    Nursing note and vitals reviewed.  Constitutional: She appears well-developed and well-nourished.   HENT:   Head: Normocephalic and atraumatic.   Right Ear: Tympanic membrane and external ear normal.   Left Ear: Tympanic membrane and external ear normal.   Nose: Mucosal edema present.   Mouth/Throat: Oropharynx is clear and moist. No oropharyngeal exudate, posterior oropharyngeal edema or posterior oropharyngeal erythema.   Postnasal drip.      Eyes: EOM and lids are normal. Pupils are equal, round, and reactive to light. Right conjunctiva is injected.   Neck: Normal range of motion. Neck supple.   Cardiovascular: Normal rate, regular rhythm, normal heart sounds and intact distal pulses. Exam reveals no gallop and no friction rub.    No murmur heard.  Pulmonary/Chest: Effort normal and breath sounds normal. No respiratory distress. She has no wheezes. She has no rhonchi. She has no rales.   Musculoskeletal: Normal range of motion.   Neurological: She is alert and oriented to person, place, and time.   Skin: Skin is warm and dry. Capillary refill takes less than 2 seconds. No rash noted.   Psychiatric: She has a normal mood and affect. Her behavior is normal.       Patient gave consent to have physical exam performed.      ED Course   Procedures         Imaging Results          X-Ray Chest PA And Lateral (Final result)  Result time 06/11/19 09:23:34    Final result by Lyndon Montelongo MD (06/11/19 09:23:34)                 Impression:      No acute abnormality.      Electronically signed by: Lyndon Montelongo MD  Date:    06/11/2019  Time:    09:23             Narrative:    EXAMINATION:  XR CHEST PA AND LATERAL    CLINICAL HISTORY:  cough;    TECHNIQUE:  PA and lateral views of the chest  were performed.    COMPARISON:  9/17/2018    FINDINGS:  The lungs are clear, with normal appearance of pulmonary vasculature and no pleural effusion or pneumothorax.    The cardiac silhouette is normal in size. The hilar and mediastinal contours are unremarkable.    Bones are intact.                                 Medical Decision Making:   Clinical Tests:   Radiological Study: Ordered and Reviewed  ED Management:  Treating with erythromycin, artifical tears. CXR ordered.   Fill and take prescriptions as directed.  Return to the ED if symptoms worsen or do not resolve.   Answered questions and discussed discharge plan.    Patient feels better and is ready for discharge.  Follow up with PCP/specialist in 1 day.              Scribe Attestation:   Scribe #1: I performed the above scribed service and the documentation accurately describes the services I performed. I attest to the accuracy of the note.     I, Dr. Emily Quinn, personally performed the services described in this documentation. This document was produced by a scribe under my direction and in my presence. All medical record entries made by the scribe were at my direction and in my presence.  I have reviewed the chart and agree that the record reflects my personal performance and is accurate and complete. Emily Quinn DO.     06/11/2019 3:20 PM             Clinical Impression:       ICD-10-CM ICD-9-CM   1. Acute conjunctivitis of both eyes, unspecified acute conjunctivitis type H10.33 372.00   2. Upper respiratory tract infection, unspecified type J06.9 465.9   3. Seasonal allergies J30.2 477.9                                Emily Quinn DO  06/11/19 1521

## 2019-06-12 ENCOUNTER — TELEPHONE (OUTPATIENT)
Dept: EMERGENCY MEDICINE | Facility: HOSPITAL | Age: 59
End: 2019-06-12

## 2019-08-05 ENCOUNTER — OFFICE VISIT (OUTPATIENT)
Dept: URGENT CARE | Facility: CLINIC | Age: 59
End: 2019-08-05
Payer: OTHER MISCELLANEOUS

## 2019-08-05 VITALS
HEART RATE: 70 BPM | TEMPERATURE: 98 F | BODY MASS INDEX: 35.3 KG/M2 | OXYGEN SATURATION: 98 % | HEIGHT: 61 IN | DIASTOLIC BLOOD PRESSURE: 90 MMHG | WEIGHT: 187 LBS | SYSTOLIC BLOOD PRESSURE: 160 MMHG

## 2019-08-05 DIAGNOSIS — S46.911A MUSCLE STRAIN OF RIGHT SCAPULAR REGION, INITIAL ENCOUNTER: Primary | ICD-10-CM

## 2019-08-05 PROCEDURE — 99203 PR OFFICE/OUTPT VISIT, NEW, LEVL III, 30-44 MIN: ICD-10-PCS | Mod: S$GLB,,, | Performed by: PHYSICIAN ASSISTANT

## 2019-08-05 PROCEDURE — 99203 OFFICE O/P NEW LOW 30 MIN: CPT | Mod: S$GLB,,, | Performed by: PHYSICIAN ASSISTANT

## 2019-08-05 NOTE — PATIENT INSTRUCTIONS
You have been seen for a work-related injury/ailment. Please return to work as instructed on discharge paperwork. If you have been given restrictions, please follow instructions has advised. It is important that you follow up at one of the Occupational Health Clinics in the next business day if further treatment or evaluation is needed.   Please call [phone] for help setting up the appointment.  A list of clinics is listed below:    - [address] [facility] [address], [place] [address] ([phone])  - [address] [place] [place] [address] ([phone])  - [address] [facility]. [address] [place] [address] ([phone])  - [address], [place] [address] ([phone])      Muscle Strain   A muscle strain is a stretching and tearing of muscle fibers. This causes pain, especially when you move that muscle. There may also be some swelling and bruising.  Home care  · Keep the hurt area raised to reduce pain and swelling. This is especially important during the first 48 hours.  · Apply an ice pack over the injured area for 15 to 20 minutes every 3 to 6 hours. You should do this for the first 24 to 48 hours. You can make an ice pack by filling a plastic bag that seals at the top with ice cubes and then wrapping it with a thin towel. Be careful not to injure your skin with the ice treatments. Ice should never be applied directly to skin. Continue the use of ice packs for relief of pain and swelling as needed. After 48 hours, apply heat (warm shower or warm bath) for 15 to 20 minutes several times a day, or alternate ice and heat.  · You may use over-the-counter pain medicine to control pain, unless another medicine was prescribed. If you have chronic liver or kidney disease or ever had a stomach ulcer or GI bleeding, talk with your healthcare provider before using these medicines.  · For leg strains: If crutches have been recommended, dont put full weight on the hurt leg until you can do so without pain. You can  return to sports when you are able to hop and run on the injured leg without pain.  Follow-up care  Follow up with your healthcare provider, or as advised.  When to seek medical advice  Call your healthcare provider right away if any of these occur:  · The toes of the injured leg become swollen, cold, blue, numb, or tingly  · Pain or swelling increases  Date Last Reviewed: 11/19/2015  © 4016-7062 [employer]. [address] [place] [address], [place] [address]. All rights reserved. This information is not intended as a substitute for professional medical care. Always follow your healthcare professional's instructions.

## 2019-08-05 NOTE — PROGRESS NOTES
Subjective:       Patient ID: Kristine Ortiz is a 58 y.o. female.    Chief Complaint: Shoulder Pain (right shoulder )    Pt is here for new  occ med injury she states that on 8/4/19 she was moving a patient when she felt a sharp pain in her right shoulder blade.     Shoulder Pain    The pain is present in the right shoulder. The current episode started yesterday. There has been no history of extremity trauma. The problem occurs constantly. The problem has been gradually worsening. The quality of the pain is described as dull. The pain is at a severity of 5/10. The pain is mild. Pertinent negatives include no fever, headaches or stiffness. The symptoms are aggravated by activity. Treatments tried: motrin.     Review of Systems   Constitution: Negative for chills and fever.   HENT: Negative for sore throat.    Eyes: Negative for blurred vision.   Cardiovascular: Negative for chest pain.   Respiratory: Negative for shortness of breath.    Skin: Negative for rash.   Musculoskeletal: Negative for back pain, joint pain, joint swelling, neck pain and stiffness.   Gastrointestinal: Negative for abdominal pain, diarrhea, nausea and vomiting.   Neurological: Negative for headaches.   Psychiatric/Behavioral: The patient is not nervous/anxious.        Objective:      Physical Exam   Constitutional: She is oriented to person, place, and time. Vital signs are normal. She appears well-developed and well-nourished. She is active and cooperative. No distress.   HENT:   Head: Normocephalic and atraumatic.   Nose: Nose normal.   Mouth/Throat: Oropharynx is clear and moist and mucous membranes are normal.   Eyes: Conjunctivae and lids are normal.   Neck: Trachea normal, normal range of motion, full passive range of motion without pain and phonation normal. Neck supple.   Cardiovascular: Normal rate, regular rhythm, normal heart sounds, intact distal pulses and normal pulses.   Pulmonary/Chest: Effort normal and breath sounds normal.    Abdominal: Soft. Normal appearance and bowel sounds are normal. She exhibits no abdominal bruit, no pulsatile midline mass and no mass.   Musculoskeletal: She exhibits no edema or deformity.        Right shoulder: She exhibits normal range of motion, no tenderness, no swelling and normal strength.        Arms:  Mild ttp and muscle tightness inferior to right scapula. No midline c/t/l ttp.    Neurological: She is alert and oriented to person, place, and time. She has normal strength and normal reflexes. No sensory deficit.   Skin: Skin is warm, dry and intact. She is not diaphoretic.   Psychiatric: She has a normal mood and affect. Her speech is normal and behavior is normal. Judgment and thought content normal. Cognition and memory are normal.   Nursing note and vitals reviewed.      Assessment:       1. Muscle strain of right scapular region, initial encounter        Plan:       You have been seen for a work-related injury/ailment. Please return to work as instructed on discharge paperwork. If you have been given restrictions, please follow instructions has advised. It is important that you follow up at one of the Occupational Health Clinics in the next business day if further treatment or evaluation is needed.   Please call [phone] for help setting up the appointment.  A list of clinics is listed below:    - [address] [facility] [address], [place] [address] ([phone])  - [address] [address] ([phone])  - [address] [facility]. [address] [address] ([phone])  - [address], [place] [address] ([phone])      Muscle Strain   A muscle strain is a stretching and tearing of muscle fibers. This causes pain, especially when you move that muscle. There may also be some swelling and bruising.  Home care  · Keep the hurt area raised to reduce pain and swelling. This is especially important during the first 48 hours.  · Apply an ice pack over the injured area for 15 to 20  minutes every 3 to 6 hours. You should do this for the first 24 to 48 hours. You can make an ice pack by filling a plastic bag that seals at the top with ice cubes and then wrapping it with a thin towel. Be careful not to injure your skin with the ice treatments. Ice should never be applied directly to skin. Continue the use of ice packs for relief of pain and swelling as needed. After 48 hours, apply heat (warm shower or warm bath) for 15 to 20 minutes several times a day, or alternate ice and heat.  · You may use over-the-counter pain medicine to control pain, unless another medicine was prescribed. If you have chronic liver or kidney disease or ever had a stomach ulcer or GI bleeding, talk with your healthcare provider before using these medicines.  · For leg strains: If crutches have been recommended, dont put full weight on the hurt leg until you can do so without pain. You can return to sports when you are able to hop and run on the injured leg without pain.  Follow-up care  Follow up with your healthcare provider, or as advised.  When to seek medical advice  Call your healthcare provider right away if any of these occur:  · The toes of the injured leg become swollen, cold, blue, numb, or tingly  · Pain or swelling increases  Date Last Reviewed: 11/19/2015  © 5684-4100 [employer]. [address] [place] [address], [place] [address]. All rights reserved. This information is not intended as a substitute for professional medical care. Always follow your healthcare professional's instructions.                    No follow-ups on file.

## 2019-08-06 ENCOUNTER — OFFICE VISIT (OUTPATIENT)
Dept: URGENT CARE | Facility: CLINIC | Age: 59
End: 2019-08-06
Payer: OTHER MISCELLANEOUS

## 2019-08-06 VITALS
OXYGEN SATURATION: 97 % | DIASTOLIC BLOOD PRESSURE: 95 MMHG | WEIGHT: 187 LBS | SYSTOLIC BLOOD PRESSURE: 156 MMHG | HEIGHT: 61 IN | BODY MASS INDEX: 35.3 KG/M2 | HEART RATE: 91 BPM | RESPIRATION RATE: 18 BRPM | TEMPERATURE: 98 F

## 2019-08-06 DIAGNOSIS — S29.019D STRAIN OF THORACIC REGION, SUBSEQUENT ENCOUNTER: ICD-10-CM
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
## 2019-08-06 DIAGNOSIS — S46.911D MUSCLE STRAIN OF RIGHT SCAPULAR REGION, SUBSEQUENT ENCOUNTER: Primary | ICD-10-CM

## 2019-08-06 PROCEDURE — 99203 OFFICE O/P NEW LOW 30 MIN: CPT | Mod: S$GLB,,, | Performed by: PHYSICIAN ASSISTANT

## 2019-08-06 PROCEDURE — 99203 PR OFFICE/OUTPT VISIT, NEW, LEVL III, 30-44 MIN: ICD-10-PCS | Mod: S$GLB,,, | Performed by: PHYSICIAN ASSISTANT

## 2019-08-06 NOTE — LETTER
[facility] [place] [address] [facility], [place] [address]  Phone: [phone]  Fax: [phone]  [facility]: [phone]    Pt Name: Kristine Ortiz  Injury Date: 08/05/2019   Employee ID:  Date of First Treatment: 08/06/2019   Company: [id number]      Appointment Time: 11:45 AM Arrived: 11:45 AM   Provider: Angélica Case PA-C Time Out: 12:32 PM      Office Treatment:   1. Muscle strain of right scapular region, subsequent encounter    2. Strain of thoracic region, subsequent encounter    3. Work related injury          Patient Instructions: Attention not to aggravate affected area, Apply ice 24-48 hours then apply heat/warm soaks(Take over-the-counter Tylenol or ibuprofen as directed as needed for pain.)    Restrictions: Regular Duty, Home today     Return Appointment: 8/9/2019 at 8:30 AM

## 2019-08-06 NOTE — PROGRESS NOTES
Subjective:       Patient ID: Kristine Ortiz is a 58 y.o. female.    Chief Complaint: Work Related Injury    Pt is here for a w/c follow up on her right shoulder blade down to her back area. 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 She hasn't baylee given any medication for her pain, but took Ibuprofen, which helped.  She states it feels better today, but still mild pain.  She has pain with certain motions.    Shoulder Pain    The pain is present in the left shoulder. This is a recurrent problem. The current episode started in the past 7 days. There has been no history of extremity trauma. The problem occurs constantly. The problem has been unchanged. The quality of the pain is described as sharp. The pain is at a severity of 4/10. The pain is mild. Pertinent negatives include no fever. She has tried nothing for the symptoms.     Review of Systems   Constitution: Negative for chills and fever.   HENT: Negative for congestion and sore throat.    Eyes: Negative for blurred vision and pain.   Cardiovascular: Negative for chest pain.   Respiratory: Negative for shortness of breath.    Skin: Negative for rash.   Musculoskeletal: Positive for back pain. Negative for joint pain.   Gastrointestinal: Negative for abdominal pain, diarrhea, nausea and vomiting.   Genitourinary: Negative for dysuria.   Neurological: Negative for dizziness and focal weakness.   Psychiatric/Behavioral: Negative for depression.   All other systems reviewed and are negative.      Objective:      Physical Exam   Constitutional: She is oriented to person, place, and time. She appears well-developed and well-nourished.   HENT:   Head: Normocephalic and atraumatic.   Eyes: Conjunctivae are normal.   Neck: Normal range of motion. Neck supple. No thyromegaly present.   Cardiovascular: Normal rate and regular rhythm. Exam reveals no gallop and no friction rub.   No murmur  heard.  Pulmonary/Chest: Effort normal and breath sounds normal. She has no wheezes. She has no rales.   Musculoskeletal: Normal range of motion.        Right shoulder: She exhibits normal range of motion, no tenderness and no bony tenderness.        Thoracic back: She exhibits tenderness. She exhibits normal range of motion and no bony tenderness.        Arms:  She is able to flex and extend her should right shoulder fully.  She is able to abduct and adduct her right shoulder fully.  She has pain at the very end of each motion.   Lymphadenopathy:     She has no cervical adenopathy.   Neurological: She is alert and oriented to person, place, and time.   Skin: Skin is warm and dry. No rash noted. No erythema.   Psychiatric: She has a normal mood and affect. Her behavior is normal. Judgment and thought content normal.   Nursing note and vitals reviewed.      Assessment:       1. Muscle strain of right scapular region, subsequent encounter    2. Strain of thoracic region, subsequent encounter    3. Work related injury        Plan:            Patient Instructions: Attention not to aggravate affected area, Apply ice 24-48 hours then apply heat/warm soaks(Take over-the-counter Tylenol or ibuprofen as directed as needed for pain.)   Restrictions: Regular Duty, Home today  Follow up in about 3 days (around 8/9/2019).

## 2020-03-13 ENCOUNTER — HOSPITAL ENCOUNTER (EMERGENCY)
Facility: HOSPITAL | Age: 60
Discharge: HOME | End: 2020-03-13
Attending: EMERGENCY MEDICINE
Payer: MEDICAID

## 2020-03-13 VITALS
RESPIRATION RATE: 18 BRPM | TEMPERATURE: 99 F | SYSTOLIC BLOOD PRESSURE: 149 MMHG | WEIGHT: 185 LBS | HEART RATE: 78 BPM | BODY MASS INDEX: 34.93 KG/M2 | HEIGHT: 61 IN | OXYGEN SATURATION: 98 % | DIASTOLIC BLOOD PRESSURE: 87 MMHG

## 2020-03-13 DIAGNOSIS — R51.9 ACUTE NONINTRACTABLE HEADACHE, UNSPECIFIED HEADACHE TYPE: Primary | ICD-10-CM

## 2020-03-13 LAB
CTP QC/QA: YES
POC MOLECULAR INFLUENZA A AGN: NEGATIVE
POC MOLECULAR INFLUENZA B AGN: NEGATIVE
POCT GLUCOSE: 111 MG/DL (ref 70–110)

## 2020-03-13 PROCEDURE — 87502 INFLUENZA DNA AMP PROBE: CPT | Mod: ER

## 2020-03-13 PROCEDURE — 96372 THER/PROPH/DIAG INJ SC/IM: CPT | Mod: ER

## 2020-03-13 PROCEDURE — 63600175 PHARM REV CODE 636 W HCPCS: Mod: ER | Performed by: EMERGENCY MEDICINE

## 2020-03-13 PROCEDURE — 82962 GLUCOSE BLOOD TEST: CPT | Mod: ER

## 2020-03-13 PROCEDURE — 99284 EMERGENCY DEPT VISIT MOD MDM: CPT | Mod: 25,ER

## 2020-03-13 RX ORDER — FLUTICASONE PROPIONATE 50 MCG
1 SPRAY, SUSPENSION (ML) NASAL 2 TIMES DAILY
Qty: 16 G | Refills: 0 | Status: SHIPPED | OUTPATIENT
Start: 2020-03-13 | End: 2021-03-30

## 2020-03-13 RX ORDER — KETOROLAC TROMETHAMINE 30 MG/ML
30 INJECTION, SOLUTION INTRAMUSCULAR; INTRAVENOUS
Status: COMPLETED | OUTPATIENT
Start: 2020-03-13 | End: 2020-03-13

## 2020-03-13 RX ORDER — IBUPROFEN 600 MG/1
600 TABLET ORAL EVERY 6 HOURS PRN
Qty: 20 TABLET | Refills: 0 | Status: SHIPPED | OUTPATIENT
Start: 2020-03-13 | End: 2020-12-07 | Stop reason: DRUGHIGH

## 2020-03-13 RX ORDER — PROMETHAZINE HYDROCHLORIDE 25 MG/1
25 TABLET ORAL EVERY 6 HOURS PRN
Qty: 15 TABLET | Refills: 0 | Status: SHIPPED | OUTPATIENT
Start: 2020-03-13 | End: 2021-03-30

## 2020-03-13 RX ORDER — ACETAMINOPHEN 500 MG
1000 TABLET ORAL EVERY 6 HOURS PRN
Qty: 30 TABLET | Refills: 0 | Status: SHIPPED | OUTPATIENT
Start: 2020-03-13 | End: 2021-06-16

## 2020-03-13 RX ORDER — DIPHENHYDRAMINE HCL 25 MG
25 CAPSULE ORAL EVERY 6 HOURS PRN
Qty: 20 CAPSULE | Refills: 0 | Status: SHIPPED | OUTPATIENT
Start: 2020-03-13 | End: 2021-06-16

## 2020-03-13 RX ADMIN — KETOROLAC TROMETHAMINE 30 MG: 30 INJECTION, SOLUTION INTRAMUSCULAR at 03:03

## 2020-03-13 NOTE — ED NOTES
"Medical screening exam completed.  I have conducted a focused provider triage encounter, findings are as follows:    Brief history of present illness:  Patient complaining of a frontal headache for 3 days.  Patient states she does not have a history of headaches.    Vitals:    03/13/20 1412   BP: (!) 147/80   BP Location: Right arm   Patient Position: Sitting   Pulse: 94   Resp: 18   Temp: 99.1 °F (37.3 °C)   TempSrc: Oral   SpO2: 98%   Weight: 83.9 kg (185 lb)   Height: 5' 1" (1.549 m)       Pertinent physical exam:  Stable VS and in no acute distress    Brief workup plan:  Influenza    Preliminary workup initiated; this workup will be continued and followed by the physician or advanced practice provider that is assigned to the patient when roomed.       LILIAN Sesay  03/13/20 1415       LILIAN Sesay  03/13/20 1416    "

## 2020-03-13 NOTE — ED PROVIDER NOTES
Encounter Date: 3/13/2020    SCRIBE #1 NOTE: I, Tashia Edwards, am scribing for, and in the presence of,  Dr. Quinn. I have scribed the following portions of the note - Other sections scribed: HPI, ROS, PE.       History     Chief Complaint   Patient presents with    Headache      X  3 DAYS. NO HX OF MIGRAINES     Kristine Ortiz is a 59 y.o. female with HTN who presents to the ED complaining of intermittent frontal headache x 3 days. Doesn't move around. Rates 5/10. Not the worst headache of her life. Doesn't usually get headaches. Took ibuprofen with relief. Did not take ibuprofen today. Denies numbness, weakness, tingling, sinus pain, sinus pressure, rhinorrhea, congestion, CP, SOB, nausea, vomiting, diarrhea, fever, and chills. States she takes her HTN medication, but does not check her BP at home.     The history is provided by the patient. No  was used.     Review of patient's allergies indicates:   Allergen Reactions    Neosporin [benzalkonium chloride]      Rash       Past Medical History:   Diagnosis Date    Arthritis     Diabetes mellitus     High cholesterol     Hypertension      Past Surgical History:   Procedure Laterality Date    c cestion      KNEE ARTHROPLASTY       No family history on file.  Social History     Tobacco Use    Smoking status: Current Every Day Smoker     Packs/day: 0.50    Smokeless tobacco: Never Used   Substance Use Topics    Alcohol use: Not Currently    Drug use: Not Currently     Review of Systems   Constitutional: Negative for chills and fever.   HENT: Negative for congestion and rhinorrhea.    Respiratory: Negative for cough and shortness of breath.    Cardiovascular: Negative for chest pain.   Gastrointestinal: Negative for abdominal pain, diarrhea, nausea and vomiting.   Neurological: Positive for headaches. Negative for dizziness, weakness, light-headedness and numbness.   All other systems reviewed and are negative.      Physical Exam      Initial Vitals [03/13/20 1412]   BP Pulse Resp Temp SpO2   (!) 147/80 94 18 99.1 °F (37.3 °C) 98 %      MAP       --         Patient gave consent to have physical exam performed.    Physical Exam    Nursing note and vitals reviewed.  Constitutional: She appears well-developed and well-nourished.   HENT:   Head: Normocephalic and atraumatic.   Right Ear: External ear normal.   Left Ear: External ear normal.   Nose: Nose normal.   Mouth/Throat: Oropharynx is clear and moist.   Eyes: Conjunctivae and EOM are normal. Pupils are equal, round, and reactive to light.   Neck: Normal range of motion and phonation normal. Neck supple.   Cardiovascular: Normal rate, regular rhythm, normal heart sounds and intact distal pulses. Exam reveals no gallop and no friction rub.    No murmur heard.  Pulmonary/Chest: Effort normal and breath sounds normal. No stridor. No respiratory distress. She has no wheezes. She has no rhonchi. She has no rales. She exhibits no tenderness.   Abdominal: Soft. Bowel sounds are normal. She exhibits no distension. There is no tenderness. There is no rigidity, no rebound and no guarding.   Musculoskeletal: Normal range of motion. She exhibits no edema or tenderness.   Neurological: She is alert and oriented to person, place, and time. She has normal strength. No cranial nerve deficit or sensory deficit. GCS score is 15. GCS eye subscore is 4. GCS verbal subscore is 5. GCS motor subscore is 6.   Cranial nerves II-XII intact. Sensation grossly intact. Bilateral  strength equal. Strength 5/5 to all extremities. Deep tendon reflexes normal.   Skin: Skin is warm and dry. Capillary refill takes less than 2 seconds. No rash noted.   Psychiatric: She has a normal mood and affect. Her behavior is normal.         ED Course   Procedures  Labs Reviewed   POCT GLUCOSE - Abnormal; Notable for the following components:       Result Value    POCT Glucose 111 (*)     All other components within normal limits    POCT INFLUENZA A/B MOLECULAR   POCT GLUCOSE MONITORING CONTINUOUS          Imaging Results          CT Head Without Contrast (Final result)  Result time 03/13/20 14:37:10    Final result by Quan Rogers MD (03/13/20 14:37:10)                 Impression:      No acute abnormality.      Electronically signed by: Quan Rogers MD  Date:    03/13/2020  Time:    14:37             Narrative:    EXAMINATION:  CT HEAD WITHOUT CONTRAST    CLINICAL HISTORY:  Headache, acute, norm neuro exam;    TECHNIQUE:  Low dose axial CT images obtained throughout the head without intravenous contrast. Sagittal and coronal reconstructions were performed.    COMPARISON:  None.    FINDINGS:  Intracranial compartment:    Ventricles and sulci are normal in size for age without evidence of hydrocephalus. No extra-axial blood or fluid collections.    The brain parenchyma appears normal. No parenchymal mass, hemorrhage, edema or major vascular distribution infarct.    Skull/extracranial contents (limited evaluation): No fracture. Mastoid air cells and paranasal sinuses are essentially clear.                                 Medical Decision Making:   History:   Old Medical Records: I decided to obtain old medical records.  Clinical Tests:   Lab Tests: Ordered and Reviewed  Radiological Study: Ordered and Reviewed  Chief complaint: headache  Differential diagnosis: non-intractable headache, stress induced headache, tension headache, migraine, influenza A, influenza B, hypoglycemia, hyperglycemia    Treatment in the ED: PE, ketorolac injection   Patient reports feeling better after treatment in the ER.      Discussed treatment, prescriptions, labs, and imaging results.    Fill and take prescriptions as directed.  Return to the ED if symptoms worsen or do not resolve.   Answered questions and discussed discharge plan.    Patient feels better and is ready for discharge.  Follow up with PCP/specialist in 1 day.            Scribe Attestation:    Scribe #1: I performed the above scribed service and the documentation accurately describes the services I performed. I attest to the accuracy of the note.     I, Dr. Emily Quinn, personally performed the services described in this documentation. This document was produced by a scribe under my direction and in my presence. All medical record entries made by the scribe were at my direction and in my presence.  I have reviewed the chart and agree that the record reflects my personal performance and is accurate and complete. Emily Quinn DO.     03/13/2020 5:13 PM                        Clinical Impression:     1. Acute nonintractable headache, unspecified headache type                ED Disposition Condition    Discharge Stable        ED Prescriptions     Medication Sig Dispense Start Date End Date Auth. Provider    fluticasone propionate (FLONASE) 50 mcg/actuation nasal spray 1 spray (50 mcg total) by Each Nostril route 2 (two) times daily. 16 g 3/13/2020  Emily Quinn DO    ibuprofen (ADVIL,MOTRIN) 600 MG tablet Take 1 tablet (600 mg total) by mouth every 6 (six) hours as needed for Pain (Take with food as needed for mild-to-moderate pain). 20 tablet 3/13/2020  Emily Qiunn DO    acetaminophen (TYLENOL) 500 MG tablet Take 2 tablets (1,000 mg total) by mouth every 6 (six) hours as needed for Pain. 30 tablet 3/13/2020  Emily Quinn DO    diphenhydrAMINE (BENADRYL) 25 mg capsule Take 1 capsule (25 mg total) by mouth every 6 (six) hours as needed for Itching or Allergies (Take if headache does not resolve). 20 capsule 3/13/2020  Emily Quinn DO    promethazine (PHENERGAN) 25 MG tablet Take 1 tablet (25 mg total) by mouth every 6 (six) hours as needed for Nausea (Taken headache does not resolve). 15 tablet 3/13/2020  Emily Quinn DO        Follow-up Information     Follow up With Specialties Details Why Contact Info    Daron Slaughter MD General Practice Schedule an appointment as soon as possible for a  visit   [address] [place] [address]  [phone]                                       Emily Quinn DO  03/13/20 2534

## 2020-04-21 DIAGNOSIS — Z01.84 ANTIBODY RESPONSE EXAMINATION: ICD-10-CM

## 2020-05-21 DIAGNOSIS — Z01.84 ANTIBODY RESPONSE EXAMINATION: ICD-10-CM

## 2020-06-20 DIAGNOSIS — Z01.84 ANTIBODY RESPONSE EXAMINATION: ICD-10-CM

## 2020-07-20 DIAGNOSIS — Z01.84 ANTIBODY RESPONSE EXAMINATION: ICD-10-CM

## 2020-08-19 DIAGNOSIS — Z01.84 ANTIBODY RESPONSE EXAMINATION: ICD-10-CM

## 2020-09-18 DIAGNOSIS — Z01.84 ANTIBODY RESPONSE EXAMINATION: ICD-10-CM

## 2020-09-27 ENCOUNTER — HOSPITAL ENCOUNTER (EMERGENCY)
Facility: HOSPITAL | Age: 60
Discharge: HOME | End: 2020-09-27
Attending: EMERGENCY MEDICINE
Payer: MEDICAID

## 2020-09-27 VITALS
BODY MASS INDEX: 35.87 KG/M2 | TEMPERATURE: 98 F | DIASTOLIC BLOOD PRESSURE: 100 MMHG | HEART RATE: 100 BPM | SYSTOLIC BLOOD PRESSURE: 161 MMHG | HEIGHT: 61 IN | OXYGEN SATURATION: 98 % | RESPIRATION RATE: 18 BRPM | WEIGHT: 190 LBS

## 2020-09-27 DIAGNOSIS — L30.9 DERMATITIS: Primary | ICD-10-CM

## 2020-09-27 LAB — POCT GLUCOSE: 126 MG/DL (ref 70–110)

## 2020-09-27 PROCEDURE — 25000003 PHARM REV CODE 250: Mod: ER | Performed by: NURSE PRACTITIONER

## 2020-09-27 PROCEDURE — 82962 GLUCOSE BLOOD TEST: CPT | Mod: ER

## 2020-09-27 PROCEDURE — 99284 EMERGENCY DEPT VISIT MOD MDM: CPT | Mod: ER

## 2020-09-27 RX ORDER — DICLOFENAC SODIUM 50 MG/1
50 TABLET, DELAYED RELEASE ORAL 3 TIMES DAILY PRN
Qty: 24 TABLET | Refills: 0 | Status: SHIPPED | OUTPATIENT
Start: 2020-09-27 | End: 2021-03-30

## 2020-09-27 RX ORDER — KETOROLAC TROMETHAMINE 10 MG/1
10 TABLET, FILM COATED ORAL
Status: DISCONTINUED | OUTPATIENT
Start: 2020-09-27 | End: 2020-09-27

## 2020-09-27 RX ORDER — TRIAMCINOLONE ACETONIDE 1 MG/G
CREAM TOPICAL 2 TIMES DAILY
Qty: 45 G | Refills: 0 | Status: SHIPPED | OUTPATIENT
Start: 2020-09-27 | End: 2020-09-27 | Stop reason: SDUPTHER

## 2020-09-27 RX ORDER — KETOROLAC TROMETHAMINE 10 MG/1
10 TABLET, FILM COATED ORAL
Status: COMPLETED | OUTPATIENT
Start: 2020-09-27 | End: 2020-09-27

## 2020-09-27 RX ORDER — TRIAMCINOLONE ACETONIDE 1 MG/G
CREAM TOPICAL 2 TIMES DAILY
Qty: 45 G | Refills: 0 | Status: SHIPPED | OUTPATIENT
Start: 2020-09-27 | End: 2021-06-16

## 2020-09-27 RX ADMIN — KETOROLAC TROMETHAMINE 10 MG: 10 TABLET, FILM COATED ORAL at 03:09

## 2020-09-27 NOTE — Clinical Note
"Kristine Valentini" Diana was seen and treated in our emergency department on 9/27/2020.  She may return to work on 09/28/2020.       If you have any questions or concerns, please don't hesitate to call.      LILIAN Interiano"

## 2020-09-27 NOTE — ED PROVIDER NOTES
"Encounter Date: 9/27/2020    SCRIBE #1 NOTE: I, Susanne Hayes , am scribing for, and in the presence of,  Tiny Admas, FNP . I have scribed the following portions of the note - Other sections scribed: HPI, ROS, PE .       History     Chief Complaint   Patient presents with    Rash     Rash to arms and stomach x 1 week, no relief from hydrocotrisone or benadryl, states "I think I have ezcema"      This is a 59 y.o. nontoxic female who presents to the ED with complaints of rash to her left arm and abdomen for 1 week. Patient reports she has a history of eczema. She attempted treatment with hydrocortisone cream (otc). Patient is scheduled for two steroid injections in her knees tomorrow. She is requesting a stronger cream.  /100.  Patient denies headache, dizziness, or blurred vision.    The history is provided by the patient. No  was used.   Rash   The current episode started several days ago. The problem is associated with an unknown factor. The rash is present on the left arm and abdomen. She has tried anti-itch cream for the symptoms. The treatment provided no relief.     Review of patient's allergies indicates:   Allergen Reactions    Neosporin [benzalkonium chloride]      Rash       Past Medical History:   Diagnosis Date    Arthritis     Diabetes mellitus     High cholesterol     Hypertension      Past Surgical History:   Procedure Laterality Date    c cestion      KNEE ARTHROPLASTY       No family history on file.  Social History     Tobacco Use    Smoking status: Current Every Day Smoker     Packs/day: 0.50    Smokeless tobacco: Never Used   Substance Use Topics    Alcohol use: Not Currently    Drug use: Not Currently     Review of Systems   Constitutional: Negative.    HENT: Negative.    Eyes: Negative.    Respiratory: Negative.  Negative for shortness of breath.    Cardiovascular: Negative.  Negative for chest pain.   Gastrointestinal: Negative.    Endocrine: Negative.  "   Genitourinary: Negative.    Musculoskeletal: Negative.    Skin: Positive for rash.   Allergic/Immunologic: Negative.    Neurological: Negative.    Hematological: Negative.    Psychiatric/Behavioral: Negative.    All other systems reviewed and are negative.      Physical Exam     Initial Vitals [09/27/20 1326]   BP Pulse Resp Temp SpO2   (!) 156/93 109 18 98.3 °F (36.8 °C) 98 %      MAP       --         Physical Exam    Nursing note and vitals reviewed.  Constitutional: She appears well-developed.   HENT:   Head: Normocephalic.   Nose: Nose normal.   Mouth/Throat: Oropharynx is clear and moist.   Eyes: Conjunctivae are normal.   Neck: Normal range of motion. Neck supple.   Cardiovascular: Normal rate, S1 normal, S2 normal and intact distal pulses.   Pulmonary/Chest: Effort normal and breath sounds normal. No respiratory distress.   Abdominal: Soft. She exhibits no distension.   Musculoskeletal: Normal range of motion. No tenderness or edema.   Neurological: She is alert and oriented to person, place, and time.   Skin: Skin is warm and dry. Capillary refill takes less than 2 seconds. Rash noted.   Eczema like rash to upper arm. No evidence of infection. No erythema. No swelling.    Psychiatric: She has a normal mood and affect. Her behavior is normal.         ED Course   Procedures  Labs Reviewed   POCT GLUCOSE - Abnormal; Notable for the following components:       Result Value    POCT Glucose 126 (*)     All other components within normal limits   POCT GLUCOSE, HAND-HELD DEVICE          Imaging Results    None          Medical Decision Making:   History:   Old Medical Records: I decided to obtain old medical records.  Initial Assessment:   This is a 59 y.o. nontoxic female who presents to the ED with complaints of rash to her left arm and abdomen for 1 week. Patient reports she has a history of eczema. She attempted treatment with hydrocortisone cream (otc). Patient is scheduled for two steroid injections in her  knees tomorrow. She is requesting a stronger cream.   Differential Diagnosis:   Contact dermatitis, Eczema, Medication Reaction.   Clinical Tests:   Lab Tests: Ordered and Reviewed  ED Management:  Physical exam.  Discharged with diclofenac and Kenalog cream.  Follow-up with PCP in 2 days and monitor your B/P. Pt verbalized understanding.               Scribe Attestation:   Scribe #1: I performed the above scribed service and the documentation accurately describes the services I performed. I attest to the accuracy of the note.    This document was produced by a scribe under my direction and in my presence. I agree with the content of the note and have made any necessary edits.     LILIAN Candelaria    09/27/2020 8:44 PM                  Clinical Impression:       ICD-10-CM ICD-9-CM   1. Dermatitis  L30.9 692.9                          ED Disposition Condition    Discharge Stable        ED Prescriptions     Medication Sig Dispense Start Date End Date Auth. Provider    diclofenac (VOLTAREN) 50 MG EC tablet Take 1 tablet (50 mg total) by mouth 3 (three) times daily as needed. 24 tablet 9/27/2020  LILIAN Interiano    triamcinolone acetonide 0.1% (KENALOG) 0.1 % cream  (Status: Discontinued) Apply topically 2 (two) times daily. Do not apply to face for 7 days 45 g 9/27/2020 9/27/2020 LILIAN Interiano    triamcinolone acetonide 0.1% (KENALOG) 0.1 % cream Apply topically 2 (two) times daily. Do not apply to face for 7 days 45 g 9/27/2020 10/4/2020 LILIAN Interiano        Follow-up Information     Follow up With Specialties Details Why Contact Info    Daron Slaughter MD General Practice In 2 days  [address] [facility]  [place] [address]  [phone]                                         LILIAN Interiano  09/27/20 2049

## 2020-10-18 DIAGNOSIS — Z01.84 ANTIBODY RESPONSE EXAMINATION: ICD-10-CM

## 2020-11-17 DIAGNOSIS — Z01.84 ANTIBODY RESPONSE EXAMINATION: ICD-10-CM

## 2020-12-03 ENCOUNTER — HOSPITAL ENCOUNTER (EMERGENCY)
Facility: HOSPITAL | Age: 60
Discharge: HOME | End: 2020-12-03
Attending: EMERGENCY MEDICINE
Payer: MEDICAID

## 2020-12-03 VITALS
HEIGHT: 61 IN | HEART RATE: 84 BPM | BODY MASS INDEX: 33.99 KG/M2 | RESPIRATION RATE: 18 BRPM | DIASTOLIC BLOOD PRESSURE: 97 MMHG | OXYGEN SATURATION: 100 % | TEMPERATURE: 99 F | WEIGHT: 180 LBS | SYSTOLIC BLOOD PRESSURE: 170 MMHG

## 2020-12-03 DIAGNOSIS — M17.12 OSTEOARTHRITIS OF LEFT KNEE, UNSPECIFIED OSTEOARTHRITIS TYPE: ICD-10-CM

## 2020-12-03 DIAGNOSIS — M19.012 OSTEOARTHRITIS OF LEFT SHOULDER, UNSPECIFIED OSTEOARTHRITIS TYPE: ICD-10-CM

## 2020-12-03 DIAGNOSIS — I10 HYPERTENSION, UNSPECIFIED TYPE: ICD-10-CM

## 2020-12-03 DIAGNOSIS — M25.562 ACUTE PAIN OF LEFT KNEE: ICD-10-CM

## 2020-12-03 DIAGNOSIS — M25.512 ACUTE PAIN OF LEFT SHOULDER: Primary | ICD-10-CM

## 2020-12-03 LAB — POCT GLUCOSE: 143 MG/DL (ref 70–110)

## 2020-12-03 PROCEDURE — 63600175 PHARM REV CODE 636 W HCPCS: Mod: ER | Performed by: EMERGENCY MEDICINE

## 2020-12-03 PROCEDURE — 82962 GLUCOSE BLOOD TEST: CPT | Mod: ER

## 2020-12-03 PROCEDURE — 96372 THER/PROPH/DIAG INJ SC/IM: CPT | Mod: ER

## 2020-12-03 PROCEDURE — 99284 EMERGENCY DEPT VISIT MOD MDM: CPT | Mod: 25,ER

## 2020-12-03 RX ORDER — PREDNISONE 10 MG/1
10 TABLET ORAL DAILY
Qty: 2 TABLET | Refills: 0 | Status: SHIPPED | OUTPATIENT
Start: 2020-12-03 | End: 2020-12-05

## 2020-12-03 RX ORDER — CYCLOBENZAPRINE HCL 10 MG
10 TABLET ORAL 3 TIMES DAILY PRN
COMMUNITY
End: 2020-12-07 | Stop reason: SDUPTHER

## 2020-12-03 RX ORDER — DIAZEPAM 5 MG/1
5 TABLET ORAL EVERY 8 HOURS PRN
Qty: 12 TABLET | Refills: 0 | Status: SHIPPED | OUTPATIENT
Start: 2020-12-03 | End: 2021-06-16

## 2020-12-03 RX ORDER — DEXAMETHASONE SODIUM PHOSPHATE 4 MG/ML
4 INJECTION, SOLUTION INTRA-ARTICULAR; INTRALESIONAL; INTRAMUSCULAR; INTRAVENOUS; SOFT TISSUE
Status: COMPLETED | OUTPATIENT
Start: 2020-12-03 | End: 2020-12-03

## 2020-12-03 RX ORDER — KETOROLAC TROMETHAMINE 30 MG/ML
15 INJECTION, SOLUTION INTRAMUSCULAR; INTRAVENOUS
Status: COMPLETED | OUTPATIENT
Start: 2020-12-03 | End: 2020-12-03

## 2020-12-03 RX ADMIN — KETOROLAC TROMETHAMINE 15 MG: 30 INJECTION, SOLUTION INTRAMUSCULAR at 10:12

## 2020-12-03 RX ADMIN — DEXAMETHASONE SODIUM PHOSPHATE 4 MG: 4 INJECTION, SOLUTION INTRA-ARTICULAR; INTRALESIONAL; INTRAMUSCULAR; INTRAVENOUS; SOFT TISSUE at 10:12

## 2020-12-03 NOTE — Clinical Note
"Kristine"Patricia Ortiz was seen and treated in our emergency department on 12/3/2020.  She may return to work on 12/07/2020.  May return to work sooner if feeling better.      If you have any questions or concerns, please don't hesitate to call.      Rangel Louise MD"

## 2020-12-03 NOTE — ED NOTES
Pt to ED c/o left knee and left shoulder pain r/t chronic arthritis. States gets steroid injections in joints every 3 months and takes ibuprofen and flexeril daily, not helping anymore. Pt ambulatory with steady gait.

## 2020-12-03 NOTE — ED PROVIDER NOTES
Encounter Date: 12/3/2020    SCRIBE #1 NOTE: I, Sherley Carpenter, am scribing for, and in the presence of,  Dr. Louise. I have scribed the following portions of the note - Other sections scribed: HPI, ROS, PE.       History     Chief Complaint   Patient presents with    Knee Pain     Chronic L knee pain worsening since Tuesday, denies any injury    Shoulder Pain     L shoulder pain since Tuesday, denies any injury     Ms. Kristine Ortiz is a 59 y.o. female with a history of arthritis and HTN who presents to the ED complaining of acute left shoulder pain and acute exacerbation of chronic left knee pain x 2 days. Denies injury to left shoulder or left knee, but states that she has had flare-ups of the same pain before, especially when the weather changes. Pain also worse with movement. Patient gets steroid shots every 3 months for the knee pain, but did not get one in October and is next scheduled for a shot in January. Patient reports that the steroid shots alleviate the pain, but she has been taking ibuprofen and Flexeril for the past 2 days without relief. Also denies relief with Icy Hot patches on the shoulder and knee. Denies right shoulder pain, chest pain, or shortness of breath. Patient denies history of DM or kidney problems. Patient states that her orthopedic doctor, Dr. Kelly Maya at [facility], has had same shoulder pain in past but hasn't discussed w ortho yet. Patient requesting a work note.    The history is provided by the patient. No  was used.     Review of patient's allergies indicates:   Allergen Reactions    Neosporin [benzalkonium chloride]      Rash       Past Medical History:   Diagnosis Date    Arthritis     Diabetes mellitus     High cholesterol     Hypertension      Past Surgical History:   Procedure Laterality Date    c cestion      KNEE ARTHROPLASTY       History reviewed. No pertinent family history.  Social History     Tobacco Use    Smoking status: Current Every Day  Smoker     Packs/day: 0.50    Smokeless tobacco: Never Used   Substance Use Topics    Alcohol use: Not Currently    Drug use: Not Currently     Review of Systems   Respiratory: Negative for shortness of breath.    Cardiovascular: Negative for chest pain.   Musculoskeletal: Positive for arthralgias.   All other systems reviewed and are negative.      Physical Exam     Initial Vitals [12/03/20 0902]   BP Pulse Resp Temp SpO2   (!) 182/83 99 17 99.4 °F (37.4 °C) 99 %      MAP       --         Physical Exam    Nursing note and vitals reviewed.  Constitutional: She appears well-developed and well-nourished.   HENT:   Head: Normocephalic and atraumatic.   Eyes: Conjunctivae are normal.   Neck: Normal range of motion. Neck supple.   Cardiovascular: Normal rate and intact distal pulses.   Pulses:       Radial pulses are 2+ on the left side.        Dorsalis pedis pulses are 2+ on the left side.   Pulmonary/Chest: Effort normal. No respiratory distress.   Abdominal: Soft. There is no abdominal tenderness.   Musculoskeletal:      Left shoulder: She exhibits decreased range of motion and pain.      Left knee: She exhibits normal range of motion, no deformity and no erythema.      Comments: Full ROM of the left knee, with no deformity or erythema. Trace abrasion at the inferior aspect of the left patella. Good perfusion of the left lower extremity, 2+ left DP.  Decreased active ROM in the left shoulder, unable to fully raise up left arm secondary to pain in shoulder. Pain worse with shoulder movement. Good perfusion in the left arm, 2+ left radial pulse.   Neurological: She is alert and oriented to person, place, and time.   Skin: Skin is warm and dry. Abrasion noted.   Psychiatric: She has a normal mood and affect.         ED Course   Procedures  Labs Reviewed   POCT GLUCOSE - Abnormal; Notable for the following components:       Result Value    POCT Glucose 143 (*)     All other components within normal limits           Imaging Results          X-Ray Shoulder 2 or More Views Left (Final result)  Result time 12/03/20 11:27:56    Final result by Devan Arredondo MD (12/03/20 11:27:56)                 Impression:      No acute bony abnormality.      Electronically signed by: Devan Arredondo MD  Date:    12/03/2020  Time:    11:27             Narrative:    EXAMINATION:  XR SHOULDER COMPLETE 2 OR MORE VIEWS LEFT    CLINICAL HISTORY:  shoulder pain;    TECHNIQUE:  Two or three views of the left shoulder were performed.    COMPARISON:  None.    FINDINGS:  No evidence of acute fracture or dislocation.  Small calcification noted at the inferior aspect of the glenoid.  Mild degenerative changes involving the glenohumeral and acromioclavicular joints.  Soft tissues are symmetric.  No radiopaque foreign body.                                 Medical Decision Making:   History:   Old Medical Records: I decided to obtain old medical records.  Clinical Tests:   Lab Tests: Ordered and Reviewed  Radiological Study: Ordered and Reviewed  ED Management:  Ms Ortiz has been stable during her time in the er. Is stable for d/c. We discussed home care and return precautions. She is stable for d/c.  Imaging results discussed, pt feels better after meds given in er. Will rest and f/u w ortho. Discussed home care and return precautions. There is no indication for further emergent intervention or evaluation at this time.               Scribe Attestation:   Scribe #1: I performed the above scribed service and the documentation accurately describes the services I performed. I attest to the accuracy of the note.    I, Rangel Louise MD, personally performed the services described in the documentation. All medical record entries made by the scribe were at my direction and in my presence. I have reviewed the chart and agree that the record reflects my personal performance and is accurate and complete.                  Clinical Impression:     ICD-10-CM  ICD-9-CM   1. Acute pain of left shoulder  M25.512 719.41   2. Acute pain of left knee  M25.562 719.46   3. Osteoarthritis of left shoulder, unspecified osteoarthritis type  M19.012 715.91   4. Osteoarthritis of left knee, unspecified osteoarthritis type  M17.12 715.96   5. Hypertension, unspecified type  I10 401.9                          ED Disposition Condition    Discharge Stable        ED Prescriptions     Medication Sig Dispense Start Date End Date Auth. Provider    diazePAM (VALIUM) 5 MG tablet Take 1 tablet (5 mg total) by mouth every 8 (eight) hours as needed (muscle relaxant). 12 tablet 12/3/2020 1/2/2021 Rangel Louise MD    predniSONE (DELTASONE) 10 MG tablet (Expires today) Take 1 tablet (10 mg total) by mouth once daily. for 2 days 2 tablet 12/3/2020 12/5/2020 Rangel Louise MD        Follow-up Information     Follow up With Specialties Details Why Contact Info    [facility] Emergency [facility]  As needed, If symptoms worsen [address] [facility] [id number] [phone]                                       Rangel Louise MD  12/05/20 0512

## 2020-12-03 NOTE — DISCHARGE INSTRUCTIONS
Rest. Drink plenty of fluids. Follow up with your orthopedist for recheck in a few days as discussed. Return for any new or acute problems or concerns.

## 2020-12-07 ENCOUNTER — OFFICE VISIT (OUTPATIENT)
Dept: ORTHOPEDICS | Facility: CLINIC | Age: 60
End: 2020-12-07
Payer: MEDICAID

## 2020-12-07 VITALS
SYSTOLIC BLOOD PRESSURE: 152 MMHG | HEART RATE: 98 BPM | HEIGHT: 61 IN | BODY MASS INDEX: 34.82 KG/M2 | DIASTOLIC BLOOD PRESSURE: 84 MMHG | WEIGHT: 184.44 LBS

## 2020-12-07 DIAGNOSIS — M17.11 PRIMARY OSTEOARTHRITIS OF RIGHT KNEE: Primary | ICD-10-CM

## 2020-12-07 DIAGNOSIS — M17.12 PRIMARY OSTEOARTHRITIS OF LEFT KNEE: ICD-10-CM

## 2020-12-07 PROCEDURE — 20610 LARGE JOINT ASPIRATION/INJECTION: BILATERAL KNEE: ICD-10-PCS | Mod: 50,S$PBB,, | Performed by: PHYSICIAN ASSISTANT

## 2020-12-07 PROCEDURE — 99213 PR OFFICE/OUTPT VISIT, EST, LEVL III, 20-29 MIN: ICD-10-PCS | Mod: S$PBB,25,, | Performed by: PHYSICIAN ASSISTANT

## 2020-12-07 PROCEDURE — 20610 DRAIN/INJ JOINT/BURSA W/O US: CPT | Mod: 50,PBBFAC,PN | Performed by: PHYSICIAN ASSISTANT

## 2020-12-07 PROCEDURE — 99214 OFFICE O/P EST MOD 30 MIN: CPT | Mod: PBBFAC,PN,25 | Performed by: PHYSICIAN ASSISTANT

## 2020-12-07 PROCEDURE — 99213 OFFICE O/P EST LOW 20 MIN: CPT | Mod: S$PBB,25,, | Performed by: PHYSICIAN ASSISTANT

## 2020-12-07 PROCEDURE — 20610 DRAIN/INJ JOINT/BURSA W/O US: CPT | Mod: 50,S$PBB,, | Performed by: PHYSICIAN ASSISTANT

## 2020-12-07 PROCEDURE — 99999 PR PBB SHADOW E&M-EST. PATIENT-LVL IV: ICD-10-PCS | Mod: PBBFAC,,, | Performed by: PHYSICIAN ASSISTANT

## 2020-12-07 PROCEDURE — 99999 PR PBB SHADOW E&M-EST. PATIENT-LVL IV: CPT | Mod: PBBFAC,,, | Performed by: PHYSICIAN ASSISTANT

## 2020-12-07 RX ORDER — BUPIVACAINE HYDROCHLORIDE 5 MG/ML
10 INJECTION, SOLUTION PERINEURAL
Status: DISCONTINUED | OUTPATIENT
Start: 2020-12-07 | End: 2020-12-07 | Stop reason: HOSPADM

## 2020-12-07 RX ORDER — KETOROLAC TROMETHAMINE 30 MG/ML
30 INJECTION, SOLUTION INTRAMUSCULAR; INTRAVENOUS
Status: DISCONTINUED | OUTPATIENT
Start: 2020-12-07 | End: 2020-12-07 | Stop reason: HOSPADM

## 2020-12-07 RX ORDER — LIDOCAINE HYDROCHLORIDE 10 MG/ML
2 INJECTION INFILTRATION; PERINEURAL
Status: DISCONTINUED | OUTPATIENT
Start: 2020-12-07 | End: 2020-12-07 | Stop reason: HOSPADM

## 2020-12-07 RX ORDER — IBUPROFEN 800 MG/1
800 TABLET ORAL
COMMUNITY
Start: 2020-11-18 | End: 2021-03-30

## 2020-12-07 RX ORDER — CYCLOBENZAPRINE HCL 10 MG
10 TABLET ORAL 3 TIMES DAILY PRN
Qty: 60 TABLET | Refills: 0 | Status: SHIPPED | OUTPATIENT
Start: 2020-12-07 | End: 2021-03-25 | Stop reason: SDUPTHER

## 2020-12-07 RX ORDER — DICLOFENAC SODIUM 50 MG/1
50 TABLET, DELAYED RELEASE ORAL 2 TIMES DAILY
Qty: 60 TABLET | Refills: 2 | Status: SHIPPED | OUTPATIENT
Start: 2020-12-07 | End: 2021-03-25 | Stop reason: SDUPTHER

## 2020-12-07 RX ADMIN — LIDOCAINE HYDROCHLORIDE 2 ML: 10 INJECTION, SOLUTION INFILTRATION; PERINEURAL at 10:12

## 2020-12-07 RX ADMIN — KETOROLAC TROMETHAMINE 30 MG: 30 INJECTION, SOLUTION INTRAMUSCULAR at 10:12

## 2020-12-07 RX ADMIN — BUPIVACAINE HYDROCHLORIDE 10 ML: 5 INJECTION, SOLUTION PERINEURAL at 10:12

## 2020-12-07 NOTE — PROGRESS NOTES
Subjective:      Patient ID: Kristine Ortiz is a 59 y.o. female.    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 Last Kenalog injected given 9/28 helped for about 2 mon. Recently had to go to ED for knee pain. She is thinking about getting a TKA next year.     Pain  This is a chronic problem. The current episode started more than 1 year ago. The problem occurs constantly. The problem has been unchanged. Associated symptoms include myalgias. Pertinent negatives include no abdominal pain, chest pain, chills, coughing, fever or weakness. The treatment provided mild relief.     Social History     Occupational History    Not on file   Tobacco Use    Smoking status: Current Every Day Smoker     Packs/day: 0.50    Smokeless tobacco: Never Used   Substance and Sexual Activity    Alcohol use: Not Currently    Drug use: Not Currently    Sexual activity: Not Currently      Review of Systems   Constitution: Negative for chills and fever.   Cardiovascular: Negative for chest pain.   Respiratory: Negative for cough.    Musculoskeletal: Positive for joint pain, myalgias and stiffness.   Gastrointestinal: Negative for abdominal pain.   Genitourinary: Negative for bladder incontinence.   Neurological: Negative for dizziness, loss of balance and weakness.   Psychiatric/Behavioral: Negative for altered mental status.         Objective:    Right Knee Exam     Tenderness   The patient is experiencing tenderness in the medial joint line.    Range of Motion   Extension: abnormal   Flexion: abnormal       Left Knee Exam     Tenderness   The patient is experiencing tenderness in the medial joint line.    Range of Motion   Extension: abnormal   Flexion: abnormal                Assessment:       1. Primary osteoarthritis of right knee    2. Primary osteoarthritis of left knee          Plan:       Kristine was seen today for pain and pain.    Diagnoses and all  orders for this visit:    Primary osteoarthritis of right knee  -     Large Joint Aspiration/Injection: bilateral knee    Primary osteoarthritis of left knee  -     Large Joint Aspiration/Injection: bilateral knee    Other orders  -     diclofenac (VOLTAREN) 50 MG EC tablet; Take 1 tablet (50 mg total) by mouth 2 (two) times daily.  -     cyclobenzaprine (FLEXERIL) 10 MG tablet; Take 1 tablet (10 mg total) by mouth 3 (three) times daily as needed for Muscle spasms.      we injected the bilateral knee w 1cc of ketorolac, marcaine and lidocaine under sterile conditions with the patient's informed consent severe bone on bone. Follow up as needed.

## 2020-12-07 NOTE — PROCEDURES
Large Joint Aspiration/Injection: bilateral knee    Date/Time: 12/7/2020 10:00 AM  Performed by: Kelly Peralta PA-C  Authorized by: Kelly Peralta PA-C     Consent Done?:  Yes (Verbal)  Indications:  Pain  Site marked: the procedure site was marked    Timeout: prior to procedure the correct patient, procedure, and site was verified    Prep: patient was prepped and draped in usual sterile fashion    Local anesthesia used?: No    Local anesthetic:  Topical anesthetic    Details:  Needle Size:  22 G  Ultrasonic Guidance for needle placement?: No    Approach:  Anterolateral  Location:  Knee  Laterality:  Bilateral  Site:  Bilateral knee  Medications (Right):  2 mL lidocaine HCL 10 mg/ml (1%) 10 mg/mL (1 %); 10 mL bupivacaine 0.5 % (5 mg/mL); 30 mg ketorolac 30 mg/mL (1 mL)  Medications (Left):  2 mL lidocaine HCL 10 mg/ml (1%) 10 mg/mL (1 %); 10 mL bupivacaine 0.5 % (5 mg/mL); 30 mg ketorolac 30 mg/mL (1 mL)  Patient tolerance:  Patient tolerated the procedure well with no immediate complications

## 2020-12-18 ENCOUNTER — IMMUNIZATION (OUTPATIENT)
Dept: INTERNAL MEDICINE | Facility: CLINIC | Age: 60
End: 2020-12-18
Payer: MEDICAID

## 2020-12-18 DIAGNOSIS — Z23 NEED FOR VACCINATION: ICD-10-CM

## 2020-12-18 PROCEDURE — 91300 COVID-19, MRNA, LNP-S, PF, 30 MCG/0.3 ML DOSE VACCINE: CPT | Mod: ,,, | Performed by: INTERNAL MEDICINE

## 2020-12-18 PROCEDURE — 0001A COVID-19, MRNA, LNP-S, PF, 30 MCG/0.3 ML DOSE VACCINE: CPT | Mod: CV19,,, | Performed by: INTERNAL MEDICINE

## 2020-12-18 PROCEDURE — 0001A COVID-19, MRNA, LNP-S, PF, 30 MCG/0.3 ML DOSE VACCINE: ICD-10-PCS | Mod: CV19,,, | Performed by: INTERNAL MEDICINE

## 2020-12-18 PROCEDURE — 91300 COVID-19, MRNA, LNP-S, PF, 30 MCG/0.3 ML DOSE VACCINE: ICD-10-PCS | Mod: ,,, | Performed by: INTERNAL MEDICINE

## 2021-01-08 ENCOUNTER — IMMUNIZATION (OUTPATIENT)
Dept: INTERNAL MEDICINE | Facility: CLINIC | Age: 61
End: 2021-01-08
Payer: MEDICAID

## 2021-01-08 DIAGNOSIS — Z23 NEED FOR VACCINATION: ICD-10-CM

## 2021-01-08 PROCEDURE — 0002A COVID-19, MRNA, LNP-S, PF, 30 MCG/0.3 ML DOSE VACCINE: CPT | Mod: PBBFAC

## 2021-01-08 PROCEDURE — 91300 COVID-19, MRNA, LNP-S, PF, 30 MCG/0.3 ML DOSE VACCINE: CPT | Mod: PBBFAC

## 2021-01-14 ENCOUNTER — OFFICE VISIT (OUTPATIENT)
Dept: ORTHOPEDICS | Facility: CLINIC | Age: 61
End: 2021-01-14
Payer: MEDICAID

## 2021-01-14 VITALS
BODY MASS INDEX: 34.86 KG/M2 | WEIGHT: 184.5 LBS | SYSTOLIC BLOOD PRESSURE: 180 MMHG | HEART RATE: 90 BPM | DIASTOLIC BLOOD PRESSURE: 81 MMHG

## 2021-01-14 DIAGNOSIS — M17.12 PRIMARY OSTEOARTHRITIS OF LEFT KNEE: ICD-10-CM

## 2021-01-14 DIAGNOSIS — M17.11 PRIMARY OSTEOARTHRITIS OF RIGHT KNEE: Primary | ICD-10-CM

## 2021-01-14 PROCEDURE — 99213 OFFICE O/P EST LOW 20 MIN: CPT | Mod: S$PBB,25,, | Performed by: PHYSICIAN ASSISTANT

## 2021-01-14 PROCEDURE — 20610 DRAIN/INJ JOINT/BURSA W/O US: CPT | Mod: 50,S$PBB,, | Performed by: PHYSICIAN ASSISTANT

## 2021-01-14 PROCEDURE — 20610 DRAIN/INJ JOINT/BURSA W/O US: CPT | Mod: 50,PBBFAC,PN | Performed by: PHYSICIAN ASSISTANT

## 2021-01-14 PROCEDURE — 99999 PR PBB SHADOW E&M-EST. PATIENT-LVL III: CPT | Mod: PBBFAC,,, | Performed by: PHYSICIAN ASSISTANT

## 2021-01-14 PROCEDURE — 99213 PR OFFICE/OUTPT VISIT, EST, LEVL III, 20-29 MIN: ICD-10-PCS | Mod: S$PBB,25,, | Performed by: PHYSICIAN ASSISTANT

## 2021-01-14 PROCEDURE — 20610 LARGE JOINT ASPIRATION/INJECTION: BILATERAL KNEE: ICD-10-PCS | Mod: 50,S$PBB,, | Performed by: PHYSICIAN ASSISTANT

## 2021-01-14 PROCEDURE — 99213 OFFICE O/P EST LOW 20 MIN: CPT | Mod: PBBFAC,PN | Performed by: PHYSICIAN ASSISTANT

## 2021-01-14 PROCEDURE — 99999 PR PBB SHADOW E&M-EST. PATIENT-LVL III: ICD-10-PCS | Mod: PBBFAC,,, | Performed by: PHYSICIAN ASSISTANT

## 2021-01-14 RX ORDER — TRIAMCINOLONE ACETONIDE 40 MG/ML
40 INJECTION, SUSPENSION INTRA-ARTICULAR; INTRAMUSCULAR
Status: DISCONTINUED | OUTPATIENT
Start: 2021-01-14 | End: 2021-01-14 | Stop reason: HOSPADM

## 2021-01-14 RX ADMIN — TRIAMCINOLONE ACETONIDE 40 MG: 40 INJECTION, SUSPENSION INTRA-ARTICULAR; INTRAMUSCULAR at 09:01

## 2021-03-25 RX ORDER — CYCLOBENZAPRINE HCL 10 MG
10 TABLET ORAL 3 TIMES DAILY PRN
Qty: 60 TABLET | Refills: 0 | Status: SHIPPED | OUTPATIENT
Start: 2021-03-25 | End: 2021-03-30 | Stop reason: SDUPTHER

## 2021-03-25 RX ORDER — DICLOFENAC SODIUM 50 MG/1
50 TABLET, DELAYED RELEASE ORAL 2 TIMES DAILY
Qty: 60 TABLET | Refills: 2 | Status: SHIPPED | OUTPATIENT
Start: 2021-03-25 | End: 2021-03-30

## 2021-03-30 ENCOUNTER — OFFICE VISIT (OUTPATIENT)
Dept: ORTHOPEDICS | Facility: CLINIC | Age: 61
End: 2021-03-30
Payer: MEDICAID

## 2021-03-30 VITALS
SYSTOLIC BLOOD PRESSURE: 178 MMHG | WEIGHT: 183 LBS | HEIGHT: 61 IN | BODY MASS INDEX: 34.55 KG/M2 | HEART RATE: 110 BPM | DIASTOLIC BLOOD PRESSURE: 107 MMHG

## 2021-03-30 DIAGNOSIS — M17.12 PRIMARY OSTEOARTHRITIS OF LEFT KNEE: Primary | ICD-10-CM

## 2021-03-30 DIAGNOSIS — M17.11 PRIMARY OSTEOARTHRITIS OF RIGHT KNEE: ICD-10-CM

## 2021-03-30 PROCEDURE — 20610 DRAIN/INJ JOINT/BURSA W/O US: CPT | Mod: 50,PBBFAC,PN | Performed by: PHYSICIAN ASSISTANT

## 2021-03-30 PROCEDURE — 99999 PR PBB SHADOW E&M-EST. PATIENT-LVL III: CPT | Mod: PBBFAC,,, | Performed by: PHYSICIAN ASSISTANT

## 2021-03-30 PROCEDURE — 20610 LARGE JOINT ASPIRATION/INJECTION: BILATERAL KNEE: ICD-10-PCS | Mod: 50,S$PBB,, | Performed by: PHYSICIAN ASSISTANT

## 2021-03-30 PROCEDURE — 99213 OFFICE O/P EST LOW 20 MIN: CPT | Mod: PBBFAC,PN,25 | Performed by: PHYSICIAN ASSISTANT

## 2021-03-30 PROCEDURE — 99213 OFFICE O/P EST LOW 20 MIN: CPT | Mod: S$PBB,25,, | Performed by: PHYSICIAN ASSISTANT

## 2021-03-30 PROCEDURE — 99999 PR PBB SHADOW E&M-EST. PATIENT-LVL III: ICD-10-PCS | Mod: PBBFAC,,, | Performed by: PHYSICIAN ASSISTANT

## 2021-03-30 PROCEDURE — 99213 PR OFFICE/OUTPT VISIT, EST, LEVL III, 20-29 MIN: ICD-10-PCS | Mod: S$PBB,25,, | Performed by: PHYSICIAN ASSISTANT

## 2021-03-30 PROCEDURE — 20610 DRAIN/INJ JOINT/BURSA W/O US: CPT | Mod: 50,S$PBB,, | Performed by: PHYSICIAN ASSISTANT

## 2021-03-30 RX ORDER — TRIAMCINOLONE ACETONIDE 40 MG/ML
40 INJECTION, SUSPENSION INTRA-ARTICULAR; INTRAMUSCULAR
Status: DISCONTINUED | OUTPATIENT
Start: 2021-03-30 | End: 2021-03-30 | Stop reason: HOSPADM

## 2021-03-30 RX ORDER — CYCLOBENZAPRINE HCL 10 MG
10 TABLET ORAL 3 TIMES DAILY PRN
Qty: 60 TABLET | Refills: 3 | Status: SHIPPED | OUTPATIENT
Start: 2021-03-30 | End: 2021-06-16

## 2021-03-30 RX ORDER — DICLOFENAC SODIUM 75 MG/1
75 TABLET, DELAYED RELEASE ORAL 2 TIMES DAILY
Qty: 60 TABLET | Refills: 2 | Status: SHIPPED | OUTPATIENT
Start: 2021-03-30 | End: 2021-04-29

## 2021-03-30 RX ADMIN — TRIAMCINOLONE ACETONIDE 40 MG: 40 INJECTION, SUSPENSION INTRA-ARTICULAR; INTRAMUSCULAR at 01:03

## 2021-05-20 ENCOUNTER — OFFICE VISIT (OUTPATIENT)
Dept: URGENT CARE | Facility: CLINIC | Age: 61
End: 2021-05-20
Payer: MEDICAID

## 2021-05-20 VITALS
BODY MASS INDEX: 34.36 KG/M2 | TEMPERATURE: 99 F | HEIGHT: 61 IN | SYSTOLIC BLOOD PRESSURE: 142 MMHG | DIASTOLIC BLOOD PRESSURE: 77 MMHG | HEART RATE: 98 BPM | WEIGHT: 182 LBS | OXYGEN SATURATION: 96 %

## 2021-05-20 DIAGNOSIS — R81 GLUCOSURIA: ICD-10-CM

## 2021-05-20 DIAGNOSIS — R35.0 URINARY FREQUENCY: ICD-10-CM

## 2021-05-20 DIAGNOSIS — E11.65 UNCONTROLLED TYPE 2 DIABETES MELLITUS WITH HYPERGLYCEMIA: Primary | ICD-10-CM

## 2021-05-20 DIAGNOSIS — N89.8 VAGINAL ITCHING: ICD-10-CM

## 2021-05-20 LAB
BILIRUB UR QL STRIP: NEGATIVE
GLUCOSE SERPL-MCNC: 491 MG/DL (ref 70–110)
GLUCOSE UR QL STRIP: POSITIVE
KETONES UR QL STRIP: NEGATIVE
LEUKOCYTE ESTERASE UR QL STRIP: NEGATIVE
PH, POC UA: 5.5 (ref 5–8)
POC BLOOD, URINE: NEGATIVE
POC NITRATES, URINE: NEGATIVE
PROT UR QL STRIP: NEGATIVE
SP GR UR STRIP: 1.01 (ref 1–1.03)
UROBILINOGEN UR STRIP-ACNC: ABNORMAL (ref 0.1–1.1)

## 2021-05-20 PROCEDURE — 82962 GLUCOSE BLOOD TEST: CPT | Mod: S$GLB,,, | Performed by: INTERNAL MEDICINE

## 2021-05-20 PROCEDURE — 81003 URINALYSIS AUTO W/O SCOPE: CPT | Mod: QW,S$GLB,, | Performed by: INTERNAL MEDICINE

## 2021-05-20 PROCEDURE — 99214 PR OFFICE/OUTPT VISIT, EST, LEVL IV, 30-39 MIN: ICD-10-PCS | Mod: 25,S$GLB,, | Performed by: INTERNAL MEDICINE

## 2021-05-20 PROCEDURE — 81003 POCT URINALYSIS, DIPSTICK, AUTOMATED, W/O SCOPE: ICD-10-PCS | Mod: QW,S$GLB,, | Performed by: INTERNAL MEDICINE

## 2021-05-20 PROCEDURE — 82962 POCT GLUCOSE, HAND-HELD DEVICE: ICD-10-PCS | Mod: S$GLB,,, | Performed by: INTERNAL MEDICINE

## 2021-05-20 PROCEDURE — 99214 OFFICE O/P EST MOD 30 MIN: CPT | Mod: 25,S$GLB,, | Performed by: INTERNAL MEDICINE

## 2021-05-20 RX ORDER — FLUCONAZOLE 150 MG/1
150 TABLET ORAL DAILY
Qty: 2 TABLET | Refills: 0 | Status: SHIPPED | OUTPATIENT
Start: 2021-05-20 | End: 2021-05-22

## 2021-06-11 ENCOUNTER — OFFICE VISIT (OUTPATIENT)
Dept: URGENT CARE | Facility: CLINIC | Age: 61
End: 2021-06-11
Payer: MEDICAID

## 2021-06-11 VITALS
OXYGEN SATURATION: 99 % | TEMPERATURE: 99 F | HEIGHT: 61 IN | SYSTOLIC BLOOD PRESSURE: 140 MMHG | RESPIRATION RATE: 14 BRPM | WEIGHT: 159 LBS | HEART RATE: 88 BPM | DIASTOLIC BLOOD PRESSURE: 75 MMHG | BODY MASS INDEX: 30.02 KG/M2

## 2021-06-11 DIAGNOSIS — B37.31 VAGINAL YEAST INFECTION: Primary | ICD-10-CM

## 2021-06-11 DIAGNOSIS — R35.0 FREQUENT URINATION: ICD-10-CM

## 2021-06-11 DIAGNOSIS — R73.9 ELEVATED BLOOD SUGAR: ICD-10-CM

## 2021-06-11 DIAGNOSIS — R81 GLUCOSURIA: ICD-10-CM

## 2021-06-11 LAB
BILIRUB UR QL STRIP: NEGATIVE
GLUCOSE SERPL-MCNC: 321 MG/DL (ref 70–110)
GLUCOSE UR QL STRIP: POSITIVE
KETONES UR QL STRIP: POSITIVE
LEUKOCYTE ESTERASE UR QL STRIP: NEGATIVE
PH, POC UA: 5.5 (ref 5–8)
POC BLOOD, URINE: NEGATIVE
POC NITRATES, URINE: NEGATIVE
PROT UR QL STRIP: NEGATIVE
SP GR UR STRIP: 1.01 (ref 1–1.03)
UROBILINOGEN UR STRIP-ACNC: ABNORMAL (ref 0.1–1.1)

## 2021-06-11 PROCEDURE — 99213 PR OFFICE/OUTPT VISIT, EST, LEVL III, 20-29 MIN: ICD-10-PCS | Mod: S$GLB,,, | Performed by: NURSE PRACTITIONER

## 2021-06-11 PROCEDURE — 81003 POCT URINALYSIS, DIPSTICK, AUTOMATED, W/O SCOPE: ICD-10-PCS | Mod: QW,S$GLB,, | Performed by: NURSE PRACTITIONER

## 2021-06-11 PROCEDURE — 81003 URINALYSIS AUTO W/O SCOPE: CPT | Mod: QW,S$GLB,, | Performed by: NURSE PRACTITIONER

## 2021-06-11 PROCEDURE — 82962 GLUCOSE BLOOD TEST: CPT | Mod: S$GLB,,, | Performed by: NURSE PRACTITIONER

## 2021-06-11 PROCEDURE — 99213 OFFICE O/P EST LOW 20 MIN: CPT | Mod: S$GLB,,, | Performed by: NURSE PRACTITIONER

## 2021-06-11 PROCEDURE — 82962 POCT GLUCOSE, HAND-HELD DEVICE: ICD-10-PCS | Mod: S$GLB,,, | Performed by: NURSE PRACTITIONER

## 2021-06-11 RX ORDER — IBUPROFEN 800 MG/1
800 TABLET ORAL 2 TIMES DAILY
COMMUNITY
End: 2021-06-16

## 2021-06-11 RX ORDER — METFORMIN HYDROCHLORIDE 500 MG/1
500 TABLET ORAL
COMMUNITY
Start: 2021-04-22 | End: 2022-04-22

## 2021-06-11 RX ORDER — FLUCONAZOLE 150 MG/1
150 TABLET ORAL DAILY
Qty: 1 TABLET | Refills: 0 | Status: SHIPPED | OUTPATIENT
Start: 2021-06-11 | End: 2021-06-12

## 2021-06-16 ENCOUNTER — HOSPITAL ENCOUNTER (EMERGENCY)
Facility: HOSPITAL | Age: 61
Discharge: HOME | End: 2021-06-16
Attending: EMERGENCY MEDICINE
Payer: MEDICAID

## 2021-06-16 VITALS
DIASTOLIC BLOOD PRESSURE: 64 MMHG | BODY MASS INDEX: 28.7 KG/M2 | HEART RATE: 64 BPM | SYSTOLIC BLOOD PRESSURE: 120 MMHG | WEIGHT: 152 LBS | TEMPERATURE: 99 F | RESPIRATION RATE: 16 BRPM | HEIGHT: 61 IN | OXYGEN SATURATION: 99 %

## 2021-06-16 DIAGNOSIS — N76.0 ACUTE VAGINITIS: ICD-10-CM

## 2021-06-16 DIAGNOSIS — N89.8 VAGINAL LESION: ICD-10-CM

## 2021-06-16 DIAGNOSIS — E87.6 HYPOKALEMIA: ICD-10-CM

## 2021-06-16 DIAGNOSIS — I10 HTN (HYPERTENSION): ICD-10-CM

## 2021-06-16 DIAGNOSIS — N39.0 ACUTE URINARY TRACT INFECTION: Primary | ICD-10-CM

## 2021-06-16 LAB
ALBUMIN SERPL-MCNC: 3.6 G/DL (ref 3.3–5.5)
ALLENS TEST: ABNORMAL
ALP SERPL-CCNC: 55 U/L (ref 42–141)
BILIRUB SERPL-MCNC: 0.8 MG/DL (ref 0.2–1.6)
BILIRUBIN, POC UA: NEGATIVE
BLOOD, POC UA: ABNORMAL
BUN SERPL-MCNC: 12 MG/DL (ref 7–22)
CALCIUM SERPL-MCNC: 9.1 MG/DL (ref 8–10.3)
CHLORIDE SERPL-SCNC: 108 MMOL/L (ref 98–108)
CLARITY, POC UA: CLEAR
COLOR, POC UA: YELLOW
CREAT SERPL-MCNC: 0.9 MG/DL (ref 0.6–1.2)
CREAT SERPL-MCNC: 1.3 MG/DL (ref 0.5–1.4)
EST. GFR  (AFRICAN AMERICAN): 51 ML/MIN/1.73 M^2
EST. GFR  (NON AFRICAN AMERICAN): 44 ML/MIN/1.73 M^2
GLUCOSE SERPL-MCNC: 256 MG/DL (ref 73–118)
GLUCOSE, POC UA: ABNORMAL
HCO3 UR-SCNC: 19.8 MMOL/L (ref 24–28)
KETONES, POC UA: NEGATIVE
LDH SERPL L TO P-CCNC: 1.15 MMOL/L (ref 0.5–2.2)
LEUKOCYTE EST, POC UA: ABNORMAL
NITRITE, POC UA: NEGATIVE
PCO2 BLDA: 35.1 MMHG (ref 35–45)
PH SMN: 7.36 [PH] (ref 7.35–7.45)
PH UR STRIP: 5.5 [PH]
PO2 BLDA: 33 MMHG (ref 40–60)
POC ALT (SGPT): 19 U/L (ref 10–47)
POC AST (SGOT): 23 U/L (ref 11–38)
POC B-TYPE NATRIURETIC PEPTIDE: 22.1 PG/ML (ref 0–100)
POC BE: -5 MMOL/L
POC CARDIAC TROPONIN I: 0.01 NG/ML
POC SATURATED O2: 62 % (ref 95–100)
POC TCO2: 21 MMOL/L (ref 24–29)
POC TCO2: 22 MMOL/L (ref 18–33)
POCT GLUCOSE: 245 MG/DL (ref 70–110)
POTASSIUM BLD-SCNC: 3.5 MMOL/L (ref 3.6–5.1)
PROTEIN, POC UA: ABNORMAL
PROTEIN, POC: 6.7 G/DL (ref 6.4–8.1)
SAMPLE: ABNORMAL
SAMPLE: NORMAL
SITE: ABNORMAL
SODIUM BLD-SCNC: 142 MMOL/L (ref 128–145)
SPECIFIC GRAVITY, POC UA: 1.01
UROBILINOGEN, POC UA: 0.2 E.U./DL

## 2021-06-16 PROCEDURE — 83880 ASSAY OF NATRIURETIC PEPTIDE: CPT | Mod: ER

## 2021-06-16 PROCEDURE — 87086 URINE CULTURE/COLONY COUNT: CPT | Performed by: EMERGENCY MEDICINE

## 2021-06-16 PROCEDURE — 96365 THER/PROPH/DIAG IV INF INIT: CPT | Mod: ER

## 2021-06-16 PROCEDURE — 63600175 PHARM REV CODE 636 W HCPCS: Mod: ER | Performed by: EMERGENCY MEDICINE

## 2021-06-16 PROCEDURE — 87591 N.GONORRHOEAE DNA AMP PROB: CPT | Mod: 59 | Performed by: EMERGENCY MEDICINE

## 2021-06-16 PROCEDURE — 25000003 PHARM REV CODE 250: Mod: ER | Performed by: EMERGENCY MEDICINE

## 2021-06-16 PROCEDURE — 87661 TRICHOMONAS VAGINALIS AMPLIF: CPT | Performed by: EMERGENCY MEDICINE

## 2021-06-16 PROCEDURE — 85025 COMPLETE CBC W/AUTO DIFF WBC: CPT | Mod: ER

## 2021-06-16 PROCEDURE — 82803 BLOOD GASES ANY COMBINATION: CPT | Mod: ER

## 2021-06-16 PROCEDURE — 25500020 PHARM REV CODE 255: Mod: ER | Performed by: EMERGENCY MEDICINE

## 2021-06-16 PROCEDURE — 99285 EMERGENCY DEPT VISIT HI MDM: CPT | Mod: 25,ER

## 2021-06-16 PROCEDURE — 84484 ASSAY OF TROPONIN QUANT: CPT | Mod: ER

## 2021-06-16 PROCEDURE — 63700000 PHARM REV CODE 250 ALT 637 W/O HCPCS: Mod: ER | Performed by: EMERGENCY MEDICINE

## 2021-06-16 PROCEDURE — 82565 ASSAY OF CREATININE: CPT | Performed by: EMERGENCY MEDICINE

## 2021-06-16 PROCEDURE — 87077 CULTURE AEROBIC IDENTIFY: CPT | Mod: 59 | Performed by: EMERGENCY MEDICINE

## 2021-06-16 PROCEDURE — 96361 HYDRATE IV INFUSION ADD-ON: CPT | Mod: ER

## 2021-06-16 PROCEDURE — 93010 ELECTROCARDIOGRAM REPORT: CPT | Mod: ,,, | Performed by: INTERNAL MEDICINE

## 2021-06-16 PROCEDURE — 82962 GLUCOSE BLOOD TEST: CPT | Mod: ER

## 2021-06-16 PROCEDURE — 87491 CHLMYD TRACH DNA AMP PROBE: CPT | Performed by: EMERGENCY MEDICINE

## 2021-06-16 PROCEDURE — 87186 SC STD MICRODIL/AGAR DIL: CPT | Mod: 59 | Performed by: EMERGENCY MEDICINE

## 2021-06-16 PROCEDURE — 80053 COMPREHEN METABOLIC PANEL: CPT | Mod: ER

## 2021-06-16 PROCEDURE — 87481 CANDIDA DNA AMP PROBE: CPT | Mod: 59 | Performed by: EMERGENCY MEDICINE

## 2021-06-16 PROCEDURE — 93010 EKG 12-LEAD: ICD-10-PCS | Mod: ,,, | Performed by: INTERNAL MEDICINE

## 2021-06-16 PROCEDURE — 87040 BLOOD CULTURE FOR BACTERIA: CPT | Mod: 59 | Performed by: EMERGENCY MEDICINE

## 2021-06-16 PROCEDURE — 81003 URINALYSIS AUTO W/O SCOPE: CPT | Mod: ER

## 2021-06-16 PROCEDURE — 93005 ELECTROCARDIOGRAM TRACING: CPT | Mod: ER

## 2021-06-16 RX ORDER — DOXYCYCLINE 100 MG/1
100 CAPSULE ORAL 2 TIMES DAILY
Qty: 20 CAPSULE | Refills: 0 | Status: SHIPPED | OUTPATIENT
Start: 2021-06-16 | End: 2021-06-26

## 2021-06-16 RX ORDER — NYSTATIN 100000 U/G
CREAM TOPICAL 2 TIMES DAILY
Qty: 30 G | Refills: 0 | Status: SHIPPED | OUTPATIENT
Start: 2021-06-16 | End: 2022-03-31

## 2021-06-16 RX ORDER — LIDOCAINE HYDROCHLORIDE 20 MG/ML
JELLY TOPICAL 2 TIMES DAILY PRN
Qty: 20 ML | Refills: 0 | Status: SHIPPED | OUTPATIENT
Start: 2021-06-16 | End: 2021-06-21

## 2021-06-16 RX ORDER — FLUCONAZOLE 150 MG/1
150 TABLET ORAL DAILY
Qty: 1 TABLET | Refills: 0 | Status: SHIPPED | OUTPATIENT
Start: 2021-06-16 | End: 2021-06-18

## 2021-06-16 RX ORDER — FLUCONAZOLE 150 MG/1
150 TABLET ORAL
Status: COMPLETED | OUTPATIENT
Start: 2021-06-16 | End: 2021-06-16

## 2021-06-16 RX ORDER — AZITHROMYCIN 250 MG/1
1000 TABLET, FILM COATED ORAL
Status: COMPLETED | OUTPATIENT
Start: 2021-06-16 | End: 2021-06-16

## 2021-06-16 RX ORDER — POTASSIUM CHLORIDE 20 MEQ/1
40 TABLET, EXTENDED RELEASE ORAL
Status: COMPLETED | OUTPATIENT
Start: 2021-06-16 | End: 2021-06-16

## 2021-06-16 RX ORDER — METRONIDAZOLE 500 MG/1
500 TABLET ORAL EVERY 12 HOURS
Qty: 14 TABLET | Refills: 0 | Status: SHIPPED | OUTPATIENT
Start: 2021-06-16 | End: 2021-06-23

## 2021-06-16 RX ORDER — ACETAMINOPHEN 325 MG/1
650 TABLET ORAL
Status: COMPLETED | OUTPATIENT
Start: 2021-06-16 | End: 2021-06-16

## 2021-06-16 RX ORDER — LIDOCAINE HYDROCHLORIDE 20 MG/ML
JELLY TOPICAL
Status: COMPLETED | OUTPATIENT
Start: 2021-06-16 | End: 2021-06-16

## 2021-06-16 RX ORDER — IBUPROFEN 600 MG/1
600 TABLET ORAL EVERY 6 HOURS PRN
Qty: 20 TABLET | Refills: 0 | Status: SHIPPED | OUTPATIENT
Start: 2021-06-16 | End: 2021-07-13

## 2021-06-16 RX ORDER — ACETAMINOPHEN 500 MG
500 TABLET ORAL EVERY 6 HOURS PRN
Qty: 30 TABLET | Refills: 0 | Status: SHIPPED | OUTPATIENT
Start: 2021-06-16 | End: 2022-01-25

## 2021-06-16 RX ORDER — HYDROXYZINE HYDROCHLORIDE 25 MG/1
25 TABLET, FILM COATED ORAL 3 TIMES DAILY PRN
Qty: 12 TABLET | Refills: 0 | Status: SHIPPED | OUTPATIENT
Start: 2021-06-16 | End: 2022-03-31

## 2021-06-16 RX ADMIN — ACETAMINOPHEN 650 MG: 325 TABLET ORAL at 12:06

## 2021-06-16 RX ADMIN — AZITHROMYCIN MONOHYDRATE 1000 MG: 250 TABLET ORAL at 12:06

## 2021-06-16 RX ADMIN — IOHEXOL 75 ML: 350 INJECTION, SOLUTION INTRAVENOUS at 01:06

## 2021-06-16 RX ADMIN — SODIUM CHLORIDE 1000 ML: 0.9 INJECTION, SOLUTION INTRAVENOUS at 12:06

## 2021-06-16 RX ADMIN — CEFTRIAXONE 1 G: 1 INJECTION, SOLUTION INTRAVENOUS at 12:06

## 2021-06-16 RX ADMIN — FLUCONAZOLE 150 MG: 150 TABLET ORAL at 03:06

## 2021-06-16 RX ADMIN — POTASSIUM CHLORIDE 40 MEQ: 1500 TABLET, EXTENDED RELEASE ORAL at 03:06

## 2021-06-16 RX ADMIN — LIDOCAINE HYDROCHLORIDE 10 ML: 20 JELLY TOPICAL at 12:06

## 2021-06-18 ENCOUNTER — HOSPITAL ENCOUNTER (EMERGENCY)
Facility: HOSPITAL | Age: 61
Discharge: HOME | End: 2021-06-18
Attending: EMERGENCY MEDICINE
Payer: MEDICAID

## 2021-06-18 VITALS
WEIGHT: 152 LBS | BODY MASS INDEX: 28.7 KG/M2 | RESPIRATION RATE: 17 BRPM | TEMPERATURE: 99 F | OXYGEN SATURATION: 100 % | HEART RATE: 67 BPM | HEIGHT: 61 IN | DIASTOLIC BLOOD PRESSURE: 81 MMHG | SYSTOLIC BLOOD PRESSURE: 151 MMHG

## 2021-06-18 DIAGNOSIS — R78.81 POSITIVE BLOOD CULTURES: Primary | ICD-10-CM

## 2021-06-18 LAB
ALBUMIN SERPL BCP-MCNC: 3.7 G/DL (ref 3.5–5.2)
ALP SERPL-CCNC: 67 U/L (ref 55–135)
ALT SERPL W/O P-5'-P-CCNC: 14 U/L (ref 10–44)
ANION GAP SERPL CALC-SCNC: 9 MMOL/L (ref 8–16)
AST SERPL-CCNC: 15 U/L (ref 10–40)
BACTERIA #/AREA URNS HPF: ABNORMAL /HPF
BACTERIA UR CULT: NORMAL
BASOPHILS # BLD AUTO: 0.07 K/UL (ref 0–0.2)
BASOPHILS NFR BLD: 1.4 % (ref 0–1.9)
BILIRUB SERPL-MCNC: 0.7 MG/DL (ref 0.1–1)
BILIRUB UR QL STRIP: NEGATIVE
BUN SERPL-MCNC: 10 MG/DL (ref 8–23)
CALCIUM SERPL-MCNC: 9.1 MG/DL (ref 8.7–10.5)
CHLORIDE SERPL-SCNC: 111 MMOL/L (ref 95–110)
CLARITY UR: ABNORMAL
CO2 SERPL-SCNC: 19 MMOL/L (ref 23–29)
COLOR UR: YELLOW
CREAT SERPL-MCNC: 1.1 MG/DL (ref 0.5–1.4)
DIFFERENTIAL METHOD: ABNORMAL
EOSINOPHIL # BLD AUTO: 0.1 K/UL (ref 0–0.5)
EOSINOPHIL NFR BLD: 2.1 % (ref 0–8)
ERYTHROCYTE [DISTWIDTH] IN BLOOD BY AUTOMATED COUNT: 13.8 % (ref 11.5–14.5)
EST. GFR  (AFRICAN AMERICAN): >60 ML/MIN/1.73 M^2
EST. GFR  (NON AFRICAN AMERICAN): 54 ML/MIN/1.73 M^2
GLUCOSE SERPL-MCNC: 309 MG/DL (ref 70–110)
GLUCOSE UR QL STRIP: ABNORMAL
HCT VFR BLD AUTO: 35.3 % (ref 37–48.5)
HGB BLD-MCNC: 11.9 G/DL (ref 12–16)
HGB UR QL STRIP: NEGATIVE
IMM GRANULOCYTES # BLD AUTO: 0.04 K/UL (ref 0–0.04)
IMM GRANULOCYTES NFR BLD AUTO: 0.8 % (ref 0–0.5)
KETONES UR QL STRIP: NEGATIVE
LACTATE SERPL-SCNC: 1.9 MMOL/L (ref 0.5–2.2)
LEUKOCYTE ESTERASE UR QL STRIP: ABNORMAL
LYMPHOCYTES # BLD AUTO: 1.5 K/UL (ref 1–4.8)
LYMPHOCYTES NFR BLD: 29.1 % (ref 18–48)
MCH RBC QN AUTO: 30.2 PG (ref 27–31)
MCHC RBC AUTO-ENTMCNC: 33.7 G/DL (ref 32–36)
MCV RBC AUTO: 90 FL (ref 82–98)
MICROSCOPIC COMMENT: ABNORMAL
MONOCYTES # BLD AUTO: 0.7 K/UL (ref 0.3–1)
MONOCYTES NFR BLD: 13.3 % (ref 4–15)
NEUTROPHILS # BLD AUTO: 2.7 K/UL (ref 1.8–7.7)
NEUTROPHILS NFR BLD: 53.3 % (ref 38–73)
NITRITE UR QL STRIP: NEGATIVE
NRBC BLD-RTO: 0 /100 WBC
PH UR STRIP: 6 [PH] (ref 5–8)
PLATELET # BLD AUTO: 341 K/UL (ref 150–450)
PMV BLD AUTO: 9.9 FL (ref 9.2–12.9)
POTASSIUM SERPL-SCNC: 3.4 MMOL/L (ref 3.5–5.1)
PROCALCITONIN SERPL IA-MCNC: 0.08 NG/ML
PROT SERPL-MCNC: 7.3 G/DL (ref 6–8.4)
PROT UR QL STRIP: NEGATIVE
RBC # BLD AUTO: 3.94 M/UL (ref 4–5.4)
SODIUM SERPL-SCNC: 139 MMOL/L (ref 136–145)
SP GR UR STRIP: 1.01 (ref 1–1.03)
URN SPEC COLLECT METH UR: ABNORMAL
UROBILINOGEN UR STRIP-ACNC: NEGATIVE EU/DL
WBC # BLD AUTO: 5.12 K/UL (ref 3.9–12.7)
WBC #/AREA URNS HPF: 15 /HPF (ref 0–5)
YEAST URNS QL MICRO: ABNORMAL

## 2021-06-18 PROCEDURE — 80053 COMPREHEN METABOLIC PANEL: CPT | Performed by: EMERGENCY MEDICINE

## 2021-06-18 PROCEDURE — 36000 PLACE NEEDLE IN VEIN: CPT

## 2021-06-18 PROCEDURE — 83605 ASSAY OF LACTIC ACID: CPT | Performed by: EMERGENCY MEDICINE

## 2021-06-18 PROCEDURE — 84145 PROCALCITONIN (PCT): CPT | Performed by: EMERGENCY MEDICINE

## 2021-06-18 PROCEDURE — 85025 COMPLETE CBC W/AUTO DIFF WBC: CPT | Performed by: EMERGENCY MEDICINE

## 2021-06-18 PROCEDURE — 81000 URINALYSIS NONAUTO W/SCOPE: CPT | Performed by: EMERGENCY MEDICINE

## 2021-06-18 PROCEDURE — 99284 EMERGENCY DEPT VISIT MOD MDM: CPT | Mod: 25

## 2021-06-18 PROCEDURE — 87086 URINE CULTURE/COLONY COUNT: CPT | Performed by: EMERGENCY MEDICINE

## 2021-06-18 PROCEDURE — 87040 BLOOD CULTURE FOR BACTERIA: CPT | Mod: 59 | Performed by: EMERGENCY MEDICINE

## 2021-06-19 LAB
BACTERIAL VAGINOSIS DNA: POSITIVE
C TRACH DNA SPEC QL NAA+PROBE: NOT DETECTED
CANDIDA GLABRATA DNA: NEGATIVE
CANDIDA KRUSEI DNA: NEGATIVE
CANDIDA RRNA VAG QL PROBE: NEGATIVE
N GONORRHOEA DNA SPEC QL NAA+PROBE: NOT DETECTED
T VAGINALIS RRNA GENITAL QL PROBE: NEGATIVE

## 2021-06-20 LAB
BACTERIA BLD CULT: ABNORMAL
BACTERIA UR CULT: NO GROWTH

## 2021-06-23 LAB
BACTERIA BLD CULT: NORMAL
BACTERIA BLD CULT: NORMAL

## 2021-07-08 ENCOUNTER — TELEPHONE (OUTPATIENT)
Dept: ORTHOPEDICS | Facility: CLINIC | Age: 61
End: 2021-07-08

## 2021-07-12 ENCOUNTER — TELEPHONE (OUTPATIENT)
Dept: ORTHOPEDICS | Facility: CLINIC | Age: 61
End: 2021-07-12

## 2021-07-13 ENCOUNTER — OFFICE VISIT (OUTPATIENT)
Dept: ORTHOPEDICS | Facility: CLINIC | Age: 61
End: 2021-07-13
Payer: MEDICAID

## 2021-07-13 ENCOUNTER — HOSPITAL ENCOUNTER (OUTPATIENT)
Dept: RADIOLOGY | Facility: HOSPITAL | Age: 61
Discharge: HOME | End: 2021-07-13
Attending: PHYSICIAN ASSISTANT
Payer: MEDICAID

## 2021-07-13 VITALS
WEIGHT: 156.44 LBS | SYSTOLIC BLOOD PRESSURE: 168 MMHG | DIASTOLIC BLOOD PRESSURE: 98 MMHG | BODY MASS INDEX: 29.55 KG/M2

## 2021-07-13 DIAGNOSIS — M17.12 PRIMARY OSTEOARTHRITIS OF LEFT KNEE: ICD-10-CM

## 2021-07-13 DIAGNOSIS — M17.11 PRIMARY OSTEOARTHRITIS OF RIGHT KNEE: ICD-10-CM

## 2021-07-13 DIAGNOSIS — M17.12 PRIMARY OSTEOARTHRITIS OF LEFT KNEE: Primary | ICD-10-CM

## 2021-07-13 PROCEDURE — 99213 OFFICE O/P EST LOW 20 MIN: CPT | Mod: S$PBB,25,, | Performed by: PHYSICIAN ASSISTANT

## 2021-07-13 PROCEDURE — 99999 PR PBB SHADOW E&M-EST. PATIENT-LVL III: ICD-10-PCS | Mod: PBBFAC,,, | Performed by: PHYSICIAN ASSISTANT

## 2021-07-13 PROCEDURE — 73562 X-RAY EXAM OF KNEE 3: CPT | Mod: 26,RT,, | Performed by: RADIOLOGY

## 2021-07-13 PROCEDURE — 20610 DRAIN/INJ JOINT/BURSA W/O US: CPT | Mod: 50,PBBFAC,PN | Performed by: PHYSICIAN ASSISTANT

## 2021-07-13 PROCEDURE — 99213 OFFICE O/P EST LOW 20 MIN: CPT | Mod: PBBFAC,PN,25 | Performed by: PHYSICIAN ASSISTANT

## 2021-07-13 PROCEDURE — 73562 X-RAY EXAM OF KNEE 3: CPT | Mod: 26,LT,, | Performed by: RADIOLOGY

## 2021-07-13 PROCEDURE — 73562 XR KNEE ORTHO BILAT: ICD-10-PCS | Mod: 26,RT,, | Performed by: RADIOLOGY

## 2021-07-13 PROCEDURE — 73562 X-RAY EXAM OF KNEE 3: CPT | Mod: TC,50,FY

## 2021-07-13 PROCEDURE — 99213 PR OFFICE/OUTPT VISIT, EST, LEVL III, 20-29 MIN: ICD-10-PCS | Mod: S$PBB,25,, | Performed by: PHYSICIAN ASSISTANT

## 2021-07-13 PROCEDURE — 20610 LARGE JOINT ASPIRATION/INJECTION: BILATERAL KNEE: ICD-10-PCS | Mod: 50,S$PBB,, | Performed by: PHYSICIAN ASSISTANT

## 2021-07-13 PROCEDURE — 20610 DRAIN/INJ JOINT/BURSA W/O US: CPT | Mod: 50,S$PBB,, | Performed by: PHYSICIAN ASSISTANT

## 2021-07-13 PROCEDURE — 99999 PR PBB SHADOW E&M-EST. PATIENT-LVL III: CPT | Mod: PBBFAC,,, | Performed by: PHYSICIAN ASSISTANT

## 2021-07-13 RX ORDER — IBUPROFEN 800 MG/1
800 TABLET ORAL 3 TIMES DAILY
Qty: 90 TABLET | Refills: 0 | Status: SHIPPED | OUTPATIENT
Start: 2021-07-13 | End: 2021-08-03 | Stop reason: SDUPTHER

## 2021-07-13 RX ORDER — AMLODIPINE BESYLATE 10 MG/1
10 TABLET ORAL DAILY
COMMUNITY
Start: 2021-05-27

## 2021-07-13 RX ORDER — ATORVASTATIN CALCIUM 40 MG/1
40 TABLET, FILM COATED ORAL NIGHTLY
COMMUNITY
Start: 2021-05-27

## 2021-07-13 RX ORDER — CYCLOBENZAPRINE HCL 5 MG
5 TABLET ORAL NIGHTLY
Qty: 30 TABLET | Refills: 2 | Status: SHIPPED | OUTPATIENT
Start: 2021-07-13 | End: 2021-10-11

## 2021-07-13 RX ORDER — LOSARTAN POTASSIUM 50 MG/1
50 TABLET ORAL DAILY
COMMUNITY
Start: 2021-05-27

## 2021-07-13 RX ORDER — TRIAMCINOLONE ACETONIDE 40 MG/ML
40 INJECTION, SUSPENSION INTRA-ARTICULAR; INTRAMUSCULAR
Status: DISCONTINUED | OUTPATIENT
Start: 2021-07-13 | End: 2021-07-13 | Stop reason: HOSPADM

## 2021-07-13 RX ADMIN — TRIAMCINOLONE ACETONIDE 40 MG: 40 INJECTION, SUSPENSION INTRA-ARTICULAR; INTRAMUSCULAR at 10:07

## 2021-08-03 DIAGNOSIS — M17.11 PRIMARY OSTEOARTHRITIS OF RIGHT KNEE: ICD-10-CM

## 2021-08-03 DIAGNOSIS — M17.12 PRIMARY OSTEOARTHRITIS OF LEFT KNEE: ICD-10-CM

## 2021-08-03 RX ORDER — IBUPROFEN 800 MG/1
800 TABLET ORAL 3 TIMES DAILY
Qty: 90 TABLET | Refills: 0 | Status: SHIPPED | OUTPATIENT
Start: 2021-08-03 | End: 2021-09-02

## 2021-08-18 ENCOUNTER — PATIENT MESSAGE (OUTPATIENT)
Dept: ORTHOPEDICS | Facility: CLINIC | Age: 61
End: 2021-08-18

## 2021-09-20 ENCOUNTER — OFFICE VISIT (OUTPATIENT)
Dept: OBSTETRICS AND GYNECOLOGY | Facility: CLINIC | Age: 61
End: 2021-09-20
Payer: MEDICAID

## 2021-09-20 VITALS
HEART RATE: 97 BPM | SYSTOLIC BLOOD PRESSURE: 146 MMHG | BODY MASS INDEX: 24.72 KG/M2 | WEIGHT: 130.94 LBS | HEIGHT: 61 IN | DIASTOLIC BLOOD PRESSURE: 80 MMHG

## 2021-09-20 DIAGNOSIS — N76.4 VULVAR ABSCESS: ICD-10-CM

## 2021-09-20 DIAGNOSIS — Z12.31 BREAST CANCER SCREENING BY MAMMOGRAM: ICD-10-CM

## 2021-09-20 DIAGNOSIS — Z01.419 ENCOUNTER FOR GYNECOLOGICAL EXAMINATION WITHOUT ABNORMAL FINDING: Primary | ICD-10-CM

## 2021-09-20 PROCEDURE — 88175 CYTOPATH C/V AUTO FLUID REDO: CPT | Performed by: OBSTETRICS & GYNECOLOGY

## 2021-09-20 PROCEDURE — 99386 PR PREVENTIVE VISIT,NEW,40-64: ICD-10-PCS | Mod: S$PBB,,, | Performed by: OBSTETRICS & GYNECOLOGY

## 2021-09-20 PROCEDURE — 99999 PR PBB SHADOW E&M-EST. PATIENT-LVL III: CPT | Mod: PBBFAC,,, | Performed by: OBSTETRICS & GYNECOLOGY

## 2021-09-20 PROCEDURE — 99213 OFFICE O/P EST LOW 20 MIN: CPT | Mod: PBBFAC | Performed by: OBSTETRICS & GYNECOLOGY

## 2021-09-20 PROCEDURE — 99386 PREV VISIT NEW AGE 40-64: CPT | Mod: S$PBB,,, | Performed by: OBSTETRICS & GYNECOLOGY

## 2021-09-20 PROCEDURE — 99999 PR PBB SHADOW E&M-EST. PATIENT-LVL III: ICD-10-PCS | Mod: PBBFAC,,, | Performed by: OBSTETRICS & GYNECOLOGY

## 2021-09-20 PROCEDURE — 87624 HPV HI-RISK TYP POOLED RSLT: CPT | Performed by: OBSTETRICS & GYNECOLOGY

## 2021-09-20 RX ORDER — SULFAMETHOXAZOLE AND TRIMETHOPRIM 400; 80 MG/1; MG/1
1 TABLET ORAL 2 TIMES DAILY
Qty: 20 TABLET | Refills: 0 | Status: SHIPPED | OUTPATIENT
Start: 2021-09-20 | End: 2021-09-30

## 2021-09-21 RX ORDER — IBUPROFEN 800 MG/1
800 TABLET ORAL 3 TIMES DAILY
COMMUNITY
End: 2021-09-21 | Stop reason: SDUPTHER

## 2021-09-21 RX ORDER — IBUPROFEN 800 MG/1
800 TABLET ORAL 3 TIMES DAILY
Qty: 60 TABLET | Refills: 1 | OUTPATIENT
Start: 2021-09-21 | End: 2022-02-11

## 2021-09-23 LAB
FINAL PATHOLOGIC DIAGNOSIS: NORMAL
HPV HR 12 DNA SPEC QL NAA+PROBE: NEGATIVE
HPV16 AG SPEC QL: NEGATIVE
HPV18 DNA SPEC QL NAA+PROBE: NEGATIVE
Lab: NORMAL

## 2021-09-24 ENCOUNTER — PATIENT MESSAGE (OUTPATIENT)
Dept: OBSTETRICS AND GYNECOLOGY | Facility: CLINIC | Age: 61
End: 2021-09-24

## 2022-01-25 ENCOUNTER — OFFICE VISIT (OUTPATIENT)
Dept: URGENT CARE | Facility: CLINIC | Age: 62
End: 2022-01-25
Payer: MEDICAID

## 2022-01-25 VITALS
SYSTOLIC BLOOD PRESSURE: 149 MMHG | BODY MASS INDEX: 24.55 KG/M2 | HEIGHT: 61 IN | OXYGEN SATURATION: 97 % | RESPIRATION RATE: 16 BRPM | HEART RATE: 105 BPM | TEMPERATURE: 97 F | WEIGHT: 130 LBS | DIASTOLIC BLOOD PRESSURE: 84 MMHG

## 2022-01-25 DIAGNOSIS — H93.8X3 CONGESTION OF BOTH EARS: ICD-10-CM

## 2022-01-25 DIAGNOSIS — H92.09 OTALGIA, UNSPECIFIED LATERALITY: Primary | ICD-10-CM

## 2022-01-25 DIAGNOSIS — R51.9 ACUTE NONINTRACTABLE HEADACHE, UNSPECIFIED HEADACHE TYPE: ICD-10-CM

## 2022-01-25 DIAGNOSIS — L20.9 ATOPIC DERMATITIS, UNSPECIFIED TYPE: ICD-10-CM

## 2022-01-25 LAB
CTP QC/QA: YES
SARS-COV-2 RDRP RESP QL NAA+PROBE: NEGATIVE

## 2022-01-25 PROCEDURE — 3077F SYST BP >= 140 MM HG: CPT | Mod: CPTII,S$GLB,,

## 2022-01-25 PROCEDURE — U0002: ICD-10-PCS | Mod: QW,S$GLB,,

## 2022-01-25 PROCEDURE — 99213 PR OFFICE/OUTPT VISIT, EST, LEVL III, 20-29 MIN: ICD-10-PCS | Mod: S$GLB,,,

## 2022-01-25 PROCEDURE — 3079F DIAST BP 80-89 MM HG: CPT | Mod: CPTII,S$GLB,,

## 2022-01-25 PROCEDURE — 3079F PR MOST RECENT DIASTOLIC BLOOD PRESSURE 80-89 MM HG: ICD-10-PCS | Mod: CPTII,S$GLB,,

## 2022-01-25 PROCEDURE — 1160F RVW MEDS BY RX/DR IN RCRD: CPT | Mod: CPTII,S$GLB,,

## 2022-01-25 PROCEDURE — 3077F PR MOST RECENT SYSTOLIC BLOOD PRESSURE >= 140 MM HG: ICD-10-PCS | Mod: CPTII,S$GLB,,

## 2022-01-25 PROCEDURE — 3008F PR BODY MASS INDEX (BMI) DOCUMENTED: ICD-10-PCS | Mod: CPTII,S$GLB,,

## 2022-01-25 PROCEDURE — U0002 COVID-19 LAB TEST NON-CDC: HCPCS | Mod: QW,S$GLB,,

## 2022-01-25 PROCEDURE — 1159F MED LIST DOCD IN RCRD: CPT | Mod: CPTII,S$GLB,,

## 2022-01-25 PROCEDURE — 1159F PR MEDICATION LIST DOCUMENTED IN MEDICAL RECORD: ICD-10-PCS | Mod: CPTII,S$GLB,,

## 2022-01-25 PROCEDURE — 99213 OFFICE O/P EST LOW 20 MIN: CPT | Mod: S$GLB,,,

## 2022-01-25 PROCEDURE — 3008F BODY MASS INDEX DOCD: CPT | Mod: CPTII,S$GLB,,

## 2022-01-25 PROCEDURE — 1160F PR REVIEW ALL MEDS BY PRESCRIBER/CLIN PHARMACIST DOCUMENTED: ICD-10-PCS | Mod: CPTII,S$GLB,,

## 2022-01-25 RX ORDER — CETIRIZINE HYDROCHLORIDE 5 MG/1
5 TABLET ORAL DAILY
Qty: 30 TABLET | Refills: 0 | OUTPATIENT
Start: 2022-01-25 | End: 2022-11-06

## 2022-01-25 RX ORDER — ACETAMINOPHEN, ASPIRIN (NSAID), AND CAFFEINE 250; 250; 65 MG/1; MG/1; MG/1
1 TABLET, FILM COATED ORAL EVERY 6 HOURS PRN
Qty: 30 TABLET | Refills: 0 | Status: SHIPPED | OUTPATIENT
Start: 2022-01-25 | End: 2022-02-04

## 2022-01-25 RX ORDER — HYDROCORTISONE 25 MG/G
CREAM TOPICAL 2 TIMES DAILY
Qty: 20 G | Refills: 0 | Status: SHIPPED | OUTPATIENT
Start: 2022-01-25 | End: 2022-03-31

## 2022-01-25 NOTE — PROGRESS NOTES
"Subjective:       Patient ID: Kristine Ortiz is a 61 y.o. female.    Vitals:  height is 5' 1" (1.549 m) and weight is 59 kg (130 lb). Her temperature is 97.4 °F (36.3 °C). Her blood pressure is 149/84 (abnormal) and her pulse is 105. Her respiration is 16 and oxygen saturation is 97%.     Chief Complaint: Otalgia    Pt presents today with a bilateral ear pain. Pt mentions her symptoms started 3 days ago and have gradually gotten worse. Pt mentions having headaches and nose bleeds associated with her chief complaint. Pt mentions taking no medication to help alleviate her symptoms. She is vaccinated against covid. Denies sick contacts. Denies taking anything for symptoms. Patient states she has a rash on both of her hands. Hands have been flaking skin and feeling really dry.    Otalgia   There is pain in both ears. This is a new problem. The current episode started in the past 7 days. The problem occurs constantly. The problem has been gradually worsening. There has been no fever. The pain is at a severity of 3/10. The pain is mild. Associated symptoms include headaches and rhinorrhea. Pertinent negatives include no abdominal pain, coughing, diarrhea, ear discharge, hearing loss, neck pain, rash, sore throat or vomiting. She has tried nothing for the symptoms. The treatment provided no relief. There is no history of a chronic ear infection, hearing loss or a tympanostomy tube.       Constitution: Negative for chills, sweating, fatigue and fever.   HENT: Positive for ear pain and nosebleeds. Negative for ear discharge, hearing loss and sore throat.    Neck: Negative for neck pain.   Eyes: Negative for eye pain and eye redness.   Respiratory: Negative for cough.    Gastrointestinal: Negative for abdominal pain, nausea, vomiting, constipation and diarrhea.   Musculoskeletal: Negative for pain, trauma, muscle cramps and muscle ache.   Skin: Positive for erythema. Negative for rash and hives.   Allergic/Immunologic: " Negative for hives and itching.   Neurological: Positive for headaches. Negative for dizziness, light-headedness, disorientation and altered mental status.   Psychiatric/Behavioral: Negative for altered mental status, disorientation and confusion.       Objective:      Physical Exam   Constitutional: She is oriented to person, place, and time. She appears well-developed and well-nourished. She is cooperative.  Non-toxic appearance. She does not have a sickly appearance. She does not appear ill. No distress.      Comments:Patient sits comfortably in exam chair. Answers questions in complete sentences. Does not show any signs of distress or discoloration.      HENT:   Head: Normocephalic and atraumatic.   Ears:   Right Ear: Hearing, external ear and ear canal normal. No middle ear effusion.   Left Ear: Hearing, external ear and ear canal normal.  No middle ear effusion.   Nose: Nose normal. No mucosal edema, rhinorrhea or nasal deformity. No epistaxis. Right sinus exhibits no maxillary sinus tenderness and no frontal sinus tenderness. Left sinus exhibits no maxillary sinus tenderness and no frontal sinus tenderness.   Mouth/Throat: Uvula is midline, oropharynx is clear and moist and mucous membranes are normal. No trismus in the jaw. Normal dentition. No uvula swelling. No oropharyngeal exudate, posterior oropharyngeal edema or posterior oropharyngeal erythema.   Eyes: Conjunctivae and lids are normal. No scleral icterus.   Neck: Trachea normal and phonation normal. Neck supple. No edema present. No erythema present. No neck rigidity present.   Cardiovascular: Normal rate, regular rhythm, normal heart sounds, intact distal pulses and normal pulses.   Pulmonary/Chest: Effort normal and breath sounds normal. No stridor. No respiratory distress. She has no decreased breath sounds. She has no wheezes. She has no rhonchi. She has no rales.   Abdominal: Normal appearance.   Musculoskeletal: Normal range of motion.          General: No deformity or edema. Normal range of motion.   Neurological: She is alert and oriented to person, place, and time. She exhibits normal muscle tone. Coordination normal.   Skin: Skin is warm, dry, intact, not diaphoretic, not pale and rash. erythema              Comments: Dry cracking skin noticed on both hands. There is scabbing over the areas where she scratched that are healing. No signs of infection.    Psychiatric: She has a normal mood and affect. Her speech is normal and behavior is normal. Judgment and thought content normal. Cognition and memory  Nursing note and vitals reviewed.        Results for orders placed or performed in visit on 01/25/22   POCT COVID-19 Rapid Screening   Result Value Ref Range    POC Rapid COVID Negative Negative     Acceptable Yes        Assessment:       1. Otalgia, unspecified laterality    2. Congestion of both ears    3. Acute nonintractable headache, unspecified headache type    4. Atopic dermatitis, unspecified type          Plan:         Otalgia, unspecified laterality  -     POCT COVID-19 Rapid Screening    Congestion of both ears  -     cetirizine (ZYRTEC) 5 MG tablet; Take 1 tablet (5 mg total) by mouth once daily.  Dispense: 30 tablet; Refill: 0    Acute nonintractable headache, unspecified headache type  -     aspirin-acetaminophen-caffeine 250-250-65 mg (EXCEDRIN EXTRA STRENGTH) 250-250-65 mg per tablet; Take 1 tablet by mouth every 6 (six) hours as needed for Pain.  Dispense: 30 tablet; Refill: 0    Atopic dermatitis, unspecified type  -     hydrocortisone 2.5 % cream; Apply topically 2 (two) times daily. for 10 days  Dispense: 20 g; Refill: 0                  Patient Instructions   - Rest.    - Drink plenty of fluids.    - Acetaminophen (tylenol) or Ibuprofen (advil,motrin) as directed as needed for fever/pain. Avoid tylenol if you have a history of liver disease. Do not take ibuprofen if you have a history of GI bleeding, kidney disease, or  if you take blood thinners.     - You must understand that you have received an Urgent Care treatment only and that you may be released before all of your medical problems are known or treated.   - You, the patient, will arrange for follow up care as instructed.   - If your condition worsens or fails to improve we recommend that you receive another evaluation at the ER immediately or contact your PCP to discuss your concerns or return here.   - Follow up with your PCP or specialty clinic as directed in the next 1-2 weeks if not improved or as needed.  You can call [phone] to schedule an appointment with the appropriate provider.      If your symptoms do not improve or worsen, go to the emergency room immediately.    Patient Education       Eczema (Atopic Dermatitis) Discharge Instructions   About this topic   Eczema is also known as atopic dermatitis. It is a common skin problem that looks like a rash. It is often itchy. The skin becomes red and swollen when scratched. This may be a long-term condition. The eczema may get worse in the winter when the air is cold and dry. The dyes and scents in lotions or soaps may make eczema worse. So can taking long hot showers or baths and washing your hands too much. Stress and contact with rough materials or chemicals can also make it worse. Some people have eczema that is affected by allergies or heredity.     It is important to treat eczema as soon as possible. This may help keep it from getting worse. You cannot catch eczema from someone else. It is more common in babies and children, but adults may have it as well.  What care is needed at home?   · Ask your doctor what you need to do when you go home. Make sure you ask questions if you do not understand what the doctor says. This way you will know what you need to do.  · Keep a written list of the drugs you take, the amounts, and when and why you take them. Bring the list of your drugs or the pill bottles when you  see your doctor. Learn why you take each drug. Do not take any over-the-counter drugs, vitamins, herbs, or food supplements without first discussing this with your doctor.  · Talk to your doctor about skin care.  ? Ask what creams or lotions are best for you to use.  ? Ask how long you should use them.  ? Use mild and unscented soap, moisturizers, and deodorants.  · Avoid direct contact with the things that can bother your skin.  ? Use products without dyes or chemicals.  ? Use products without alcohol or a scent.  ? Some people are bothered by wool or synthetic fabrics. Other things that may bother your skin are:  § Household , detergents, or soaps  § Aftershave, lotions, or perfumes  § Fabric softening products  · Prevent scratching.  ? Wear gloves to protect skin on your hands. Try wearing cotton gloves under plastic gloves. Remove both sets of gloves from time to time to prevent sweating.  ? Bathe with cool or warm water. Do not use hot water. Pat yourself dry with a clean, thick, soft towel.  ? Stay hydrated.  ? Keep nails short and clean. If you scratch in your sleep, wear white cotton gloves to bed. Try using cool compresses on the skin. They may help with swelling and itching. Dip a cloth in cold water and put it right on your itchy skin.  · Learn how to handle stress. Changing your activities may help lower stress. Your doctor can help you learn how to cope with stress.  · Use the drugs as ordered by your doctor. Not using these drugs can cause eczema to return or get worse.  What follow-up care is needed?   Your doctor may ask you to make office visits to check on your progress. Be sure to keep these visits.  What drugs may be needed?   The doctor may order drugs to:  · Soften and add moisture to your skin  · Control itching or allergy  · Help with swelling and redness  · Loosen and remove scaly lesions  · Fight an infection  Will physical activity be limited?   You may still be active with eczema.  Talk to your doctor about the right kind of activity for you. You may want to join a support group or talk with others who have the same illness. Learn how they cope and what activities work well for them.  What problems could happen?   · Infection  · Long-lasting scarring  · Constant itching  What can be done to prevent this health problem?   · A child may be less likely to get eczema if they are  for the first 4 months of life.  · Have good skin hygiene.  · Keep skin moist.  · Bathe in cool or warm water. Avoid bathing in hot water.  When do I need to call the doctor?   · Signs of a very bad reaction. These include wheezing; chest tightness; fever; itching; bad cough; blue skin color; seizures; or swelling of face, lips, tongue, or throat. Go to the ER right away.  · Signs of infection. These include a fever of 100.4°F (38°C) or higher, chills, wound that will not heal.  · If you feel depressed.  · If you are not sleeping because you are itching.  · If your rash has pus or yellow scabs on it.  · If your rash worsens and covers most of your body.  · If you notice a rash or blisters in your mouth or on your eyes or lips.  · Your rash flares up after you have been around someone with cold sores or fever blisters.  Teach Back: Helping You Understand   The Teach Back Method helps you understand the information we are giving you. After you talk with the staff, tell them in your own words what you learned. This helps to make sure the staff has described each thing clearly. It also helps to explain things that may have been confusing. Before going home, make sure you can do these:  · I can tell you about my condition.  · I can tell you how to care for my skin.  · I can tell you what I will do if my rash has pus or yellow scabs on it.  Where can I learn more?   [employer]  https://familydoctor.org/condition/eczema-and-atopic-dermatitis/?adfree=true   Better Health  Channel  https://www.betterhealth.[place]/health/ConditionsAndTreatments/eczema-atopic-dermatitis   [facility] Choices  https://www.nhs.uk/conditions/atopic-eczema/   Last Reviewed Date   2021-06-14  Consumer Information Use and Disclaimer   This information is not specific medical advice and does not replace information you receive from your health care provider. This is only a brief summary of general information. It does NOT include all information about conditions, illnesses, injuries, tests, procedures, treatments, therapies, discharge instructions or life-style choices that may apply to you. You must talk with your health care provider for complete information about your health and treatment options. This information should not be used to decide whether or not to accept your health care providers advice, instructions or recommendations. Only your health care provider has the knowledge and training to provide advice that is right for you.  Copyright   Copyright © 2021 [employer] and its affiliates and/or licensors. All rights reserved.  Patient Education       Outer Ear Infection Discharge Instructions   About this topic   If your outer ear or ear canal is swollen and infected, you have an outer ear infection. This is also known as swimmers ear, but you can get it even if you are not swimming. An outer ear infection happens when the skin in the ear canal is scratched or irritated and then gets infected. You may need antibiotics to treat the infection. If you are given ear drops, it is important to take all of them, even if you start to feel better.     What care is needed at home?   · Ask your doctor what you need to do when you go home. Make sure you ask questions if you do not understand what the doctor says.  · Take your drugs as ordered by your doctor.  ? Sit or lie down with your head to the side and the ear that needs the ear drops pointing up.  ? Pull on your ear to straighten the ear canal.  ? Gently  pull the ear up and toward the back of the head to straighten it. If you are giving the ear drops to a child under 3 years of age, gently pull the earlobe down and toward the back of the head.  ? Put the correct number of drops in your ear.  ? Gently press the small skin flap over the ear to help the drops run into the ear.  ? Continue to sit or lie with your head to the side for 5 minutes.  ? Your doctor may want you to put a small cotton plug in your ear to help keep the drops in place.  · Keep the inside of your ear dry while it heals. You can protect your ear when you shower with a petroleum jelly-coated cotton ball. Avoid swimming for 7 to 10 days.  · Do not use in-ear head phones (ear buds) or hearing aids while your ear heals.  · Heat may help ease your ear pain. If your doctor tells you to use heat, put a heating pad or hot water bottle on your ear for no more than 20 minutes at a time. Never go to sleep with a heating pad on as this can cause burns.  What follow-up care is needed?   Your doctor may ask you to make visits to the office to check on your progress. Be sure to keep these visits.  What drugs may be needed?   The doctor may order drugs to:  · Help with pain and swelling  · Fight an infection  · Relieve itching  Will physical activity be limited?   You may have to limit your activity. Talk to your doctor about when you may swim again. Keep water out of your ears when you bathe. Dont wear hearing aids or ear phones until symptoms improve. Ask if there is anything you can do to lower your chance of more ear infections.  What problems could happen?   · Very bad infection  · Hearing problems  What can be done to prevent this health problem?   · Keep your ears dry:  ? Use ear plugs when swimming.  ? Use a towel or blow dry your ears when they are wet.  · Do not swim in dirty or polluted water.  · Avoid getting soap or other items in your ears.  · Do not scratch your ears.  · Do not put swabs, fingers,  or other objects in your ears.  · Clean hearing aids often, and take them out each night.  · Wear over-the-ear phones as opposed to in-ear buds or ensure your ear buds are clean before each use.  When do I need to call the doctor?   · Your symptoms are not better within 2 days after starting treatment.  · Your ear starts to bleed or drain pus.  · You have a fever of 100.4°F (38°C)  Helpful tips   · Talk to your doctor to see if there are drops you can use to help prevent the growth of germs.  · Outer ear infections are not contagious, but need treatment.  Teach Back: Helping You Understand   The Teach Back Method helps you understand the information we are giving you. After you talk with the staff, tell them in your own words what you learned. This helps to make sure the staff has described each thing clearly. It also helps to explain things that may have been confusing. Before going home, make sure you can do these:  · I can tell you about my condition.  · I can tell you what may help ease my pain.  · I can tell you what I will do if I have pain or redness behind my ear.  Where can I learn more?   [employer]  https://familydoctor.org/condition/otitis-externa-swimmers-ear/   [facility]  https://www.enthealth.org/conditions/swimmers-ear-otitis-externa/   Last Reviewed Date   2021-06-21  Consumer Information Use and Disclaimer   This information is not specific medical advice and does not replace information you receive from your health care provider. This is only a brief summary of general information. It does NOT include all information about conditions, illnesses, injuries, tests, procedures, treatments, therapies, discharge instructions or life-style choices that may apply to you. You must talk with your health care provider for complete information about your health and treatment options. This information should not be used to decide whether or not to accept your health care providers advice,  instructions or recommendations. Only your health care provider has the knowledge and training to provide advice that is right for you.  Copyright   Copyright © 2021 [employer] and its affiliates and/or licensors. All rights reserved.

## 2022-01-25 NOTE — PATIENT INSTRUCTIONS
- Rest.    - Drink plenty of fluids.    - Acetaminophen (tylenol) or Ibuprofen (advil,motrin) as directed as needed for fever/pain. Avoid tylenol if you have a history of liver disease. Do not take ibuprofen if you have a history of GI bleeding, kidney disease, or if you take blood thinners.     - You must understand that you have received an Urgent Care treatment only and that you may be released before all of your medical problems are known or treated.   - You, the patient, will arrange for follow up care as instructed.   - If your condition worsens or fails to improve we recommend that you receive another evaluation at the ER immediately or contact your PCP to discuss your concerns or return here.   - Follow up with your PCP or specialty clinic as directed in the next 1-2 weeks if not improved or as needed.  You can call [phone] to schedule an appointment with the appropriate provider.      If your symptoms do not improve or worsen, go to the emergency room immediately.    Patient Education       Eczema (Atopic Dermatitis) Discharge Instructions   About this topic   Eczema is also known as atopic dermatitis. It is a common skin problem that looks like a rash. It is often itchy. The skin becomes red and swollen when scratched. This may be a long-term condition. The eczema may get worse in the winter when the air is cold and dry. The dyes and scents in lotions or soaps may make eczema worse. So can taking long hot showers or baths and washing your hands too much. Stress and contact with rough materials or chemicals can also make it worse. Some people have eczema that is affected by allergies or heredity.     It is important to treat eczema as soon as possible. This may help keep it from getting worse. You cannot catch eczema from someone else. It is more common in babies and children, but adults may have it as well.  What care is needed at home?   · Ask your doctor what you need to do when you go home. Make  sure you ask questions if you do not understand what the doctor says. This way you will know what you need to do.  · Keep a written list of the drugs you take, the amounts, and when and why you take them. Bring the list of your drugs or the pill bottles when you see your doctor. Learn why you take each drug. Do not take any over-the-counter drugs, vitamins, herbs, or food supplements without first discussing this with your doctor.  · Talk to your doctor about skin care.  ? Ask what creams or lotions are best for you to use.  ? Ask how long you should use them.  ? Use mild and unscented soap, moisturizers, and deodorants.  · Avoid direct contact with the things that can bother your skin.  ? Use products without dyes or chemicals.  ? Use products without alcohol or a scent.  ? Some people are bothered by wool or synthetic fabrics. Other things that may bother your skin are:  § Household , detergents, or soaps  § Aftershave, lotions, or perfumes  § Fabric softening products  · Prevent scratching.  ? Wear gloves to protect skin on your hands. Try wearing cotton gloves under plastic gloves. Remove both sets of gloves from time to time to prevent sweating.  ? Bathe with cool or warm water. Do not use hot water. Pat yourself dry with a clean, thick, soft towel.  ? Stay hydrated.  ? Keep nails short and clean. If you scratch in your sleep, wear white cotton gloves to bed. Try using cool compresses on the skin. They may help with swelling and itching. Dip a cloth in cold water and put it right on your itchy skin.  · Learn how to handle stress. Changing your activities may help lower stress. Your doctor can help you learn how to cope with stress.  · Use the drugs as ordered by your doctor. Not using these drugs can cause eczema to return or get worse.  What follow-up care is needed?   Your doctor may ask you to make office visits to check on your progress. Be sure to keep these visits.  What drugs may be needed?   The  doctor may order drugs to:  · Soften and add moisture to your skin  · Control itching or allergy  · Help with swelling and redness  · Loosen and remove scaly lesions  · Fight an infection  Will physical activity be limited?   You may still be active with eczema. Talk to your doctor about the right kind of activity for you. You may want to join a support group or talk with others who have the same illness. Learn how they cope and what activities work well for them.  What problems could happen?   · Infection  · Long-lasting scarring  · Constant itching  What can be done to prevent this health problem?   · A child may be less likely to get eczema if they are  for the first 4 months of life.  · Have good skin hygiene.  · Keep skin moist.  · Bathe in cool or warm water. Avoid bathing in hot water.  When do I need to call the doctor?   · Signs of a very bad reaction. These include wheezing; chest tightness; fever; itching; bad cough; blue skin color; seizures; or swelling of face, lips, tongue, or throat. Go to the ER right away.  · Signs of infection. These include a fever of 100.4°F (38°C) or higher, chills, wound that will not heal.  · If you feel depressed.  · If you are not sleeping because you are itching.  · If your rash has pus or yellow scabs on it.  · If your rash worsens and covers most of your body.  · If you notice a rash or blisters in your mouth or on your eyes or lips.  · Your rash flares up after you have been around someone with cold sores or fever blisters.  Teach Back: Helping You Understand   The Teach Back Method helps you understand the information we are giving you. After you talk with the staff, tell them in your own words what you learned. This helps to make sure the staff has described each thing clearly. It also helps to explain things that may have been confusing. Before going home, make sure you can do these:  · I can tell you about my condition.  · I can tell you how to care for my  skin.  · I can tell you what I will do if my rash has pus or yellow scabs on it.  Where can I learn more?   [employer]  https://familydoctor.org/condition/eczema-and-atopic-dermatitis/?adfree=true   [employer] [facility]  https://www.betterhealth.[place].gov.[place]/health/ConditionsAndTreatments/eczema-atopic-dermatitis   [facility] Choices  https://www.nhs.uk/conditions/atopic-eczema/   Last Reviewed Date   2021-06-14  Consumer Information Use and Disclaimer   This information is not specific medical advice and does not replace information you receive from your health care provider. This is only a brief summary of general information. It does NOT include all information about conditions, illnesses, injuries, tests, procedures, treatments, therapies, discharge instructions or life-style choices that may apply to you. You must talk with your health care provider for complete information about your health and treatment options. This information should not be used to decide whether or not to accept your health care providers advice, instructions or recommendations. Only your health care provider has the knowledge and training to provide advice that is right for you.  Copyright   Copyright © 2021 [employer] and its affiliates and/or licensors. All rights reserved.  Patient Education       Outer Ear Infection Discharge Instructions   About this topic   If your outer ear or ear canal is swollen and infected, you have an outer ear infection. This is also known as swimmers ear, but you can get it even if you are not swimming. An outer ear infection happens when the skin in the ear canal is scratched or irritated and then gets infected. You may need antibiotics to treat the infection. If you are given ear drops, it is important to take all of them, even if you start to feel better.     What care is needed at home?   · Ask your doctor what you need to do when you go home. Make sure you ask questions if you  do not understand what the doctor says.  · Take your drugs as ordered by your doctor.  ? Sit or lie down with your head to the side and the ear that needs the ear drops pointing up.  ? Pull on your ear to straighten the ear canal.  ? Gently pull the ear up and toward the back of the head to straighten it. If you are giving the ear drops to a child under 3 years of age, gently pull the earlobe down and toward the back of the head.  ? Put the correct number of drops in your ear.  ? Gently press the small skin flap over the ear to help the drops run into the ear.  ? Continue to sit or lie with your head to the side for 5 minutes.  ? Your doctor may want you to put a small cotton plug in your ear to help keep the drops in place.  · Keep the inside of your ear dry while it heals. You can protect your ear when you shower with a petroleum jelly-coated cotton ball. Avoid swimming for 7 to 10 days.  · Do not use in-ear head phones (ear buds) or hearing aids while your ear heals.  · Heat may help ease your ear pain. If your doctor tells you to use heat, put a heating pad or hot water bottle on your ear for no more than 20 minutes at a time. Never go to sleep with a heating pad on as this can cause burns.  What follow-up care is needed?   Your doctor may ask you to make visits to the office to check on your progress. Be sure to keep these visits.  What drugs may be needed?   The doctor may order drugs to:  · Help with pain and swelling  · Fight an infection  · Relieve itching  Will physical activity be limited?   You may have to limit your activity. Talk to your doctor about when you may swim again. Keep water out of your ears when you bathe. Dont wear hearing aids or ear phones until symptoms improve. Ask if there is anything you can do to lower your chance of more ear infections.  What problems could happen?   · Very bad infection  · Hearing problems  What can be done to prevent this health problem?   · Keep your ears  dry:  ? Use ear plugs when swimming.  ? Use a towel or blow dry your ears when they are wet.  · Do not swim in dirty or polluted water.  · Avoid getting soap or other items in your ears.  · Do not scratch your ears.  · Do not put swabs, fingers, or other objects in your ears.  · Clean hearing aids often, and take them out each night.  · Wear over-the-ear phones as opposed to in-ear buds or ensure your ear buds are clean before each use.  When do I need to call the doctor?   · Your symptoms are not better within 2 days after starting treatment.  · Your ear starts to bleed or drain pus.  · You have a fever of 100.4°F (38°C)  Helpful tips   · Talk to your doctor to see if there are drops you can use to help prevent the growth of germs.  · Outer ear infections are not contagious, but need treatment.  Teach Back: Helping You Understand   The Teach Back Method helps you understand the information we are giving you. After you talk with the staff, tell them in your own words what you learned. This helps to make sure the staff has described each thing clearly. It also helps to explain things that may have been confusing. Before going home, make sure you can do these:  · I can tell you about my condition.  · I can tell you what may help ease my pain.  · I can tell you what I will do if I have pain or redness behind my ear.  Where can I learn more?   [employer]  https://familydoctor.org/condition/otitis-externa-swimmers-ear/   [facility]  https://www.[facility].org/conditions/swimmers-ear-otitis-externa/   Last Reviewed Date   2021-06-21  Consumer Information Use and Disclaimer   This information is not specific medical advice and does not replace information you receive from your health care provider. This is only a brief summary of general information. It does NOT include all information about conditions, illnesses, injuries, tests, procedures, treatments, therapies, discharge instructions or life-style  choices that may apply to you. You must talk with your health care provider for complete information about your health and treatment options. This information should not be used to decide whether or not to accept your health care providers advice, instructions or recommendations. Only your health care provider has the knowledge and training to provide advice that is right for you.  Copyright   Copyright © 2021 [employer] and its affiliates and/or licensors. All rights reserved.

## 2022-01-25 NOTE — LETTER
January 25, 2022      [facility] - [facility]  [address], [address]  [place] [address]  Phone: [phone]  Fax: [phone]       Patient: Kristine Ortiz   YOB: 1960  Date of Visit: 01/25/2022    To Whom It May Concern:    Eyad Ortiz  was at [facility] on 01/25/2022. The patient may return to work/school on 01/26/22 with no restrictions. If you have any questions or concerns, or if I can be of further assistance, please do not hesitate to contact me.    Sincerely,    Meeta Ramirez PA-C

## 2022-02-11 ENCOUNTER — HOSPITAL ENCOUNTER (EMERGENCY)
Facility: HOSPITAL | Age: 62
Discharge: HOME | End: 2022-02-11
Attending: EMERGENCY MEDICINE
Payer: MEDICAID

## 2022-02-11 VITALS
SYSTOLIC BLOOD PRESSURE: 145 MMHG | DIASTOLIC BLOOD PRESSURE: 72 MMHG | RESPIRATION RATE: 18 BRPM | TEMPERATURE: 99 F | HEART RATE: 35 BPM | WEIGHT: 128 LBS | HEIGHT: 61 IN | OXYGEN SATURATION: 98 % | BODY MASS INDEX: 24.17 KG/M2

## 2022-02-11 DIAGNOSIS — I10 HTN (HYPERTENSION): ICD-10-CM

## 2022-02-11 DIAGNOSIS — S00.81XA ABRASION OF FOREHEAD, INITIAL ENCOUNTER: ICD-10-CM

## 2022-02-11 DIAGNOSIS — S00.83XA CONTUSION OF FOREHEAD, INITIAL ENCOUNTER: Primary | ICD-10-CM

## 2022-02-11 DIAGNOSIS — R73.9 HYPERGLYCEMIA: ICD-10-CM

## 2022-02-11 LAB
ALBUMIN SERPL-MCNC: 3.7 G/DL (ref 3.3–5.5)
ALLENS TEST: ABNORMAL
ALP SERPL-CCNC: 91 U/L (ref 42–141)
BILIRUB SERPL-MCNC: 0.6 MG/DL (ref 0.2–1.6)
BILIRUBIN, POC UA: NEGATIVE
BLOOD, POC UA: NEGATIVE
BUN SERPL-MCNC: 11 MG/DL (ref 7–22)
CALCIUM SERPL-MCNC: 9.9 MG/DL (ref 8–10.3)
CHLORIDE SERPL-SCNC: 105 MMOL/L (ref 98–108)
CLARITY, POC UA: CLEAR
COLOR, POC UA: YELLOW
CREAT SERPL-MCNC: 0.6 MG/DL (ref 0.6–1.2)
GLUCOSE SERPL-MCNC: 373 MG/DL (ref 73–118)
GLUCOSE, POC UA: ABNORMAL
HCO3 UR-SCNC: 29.4 MMOL/L (ref 24–28)
KETONES, POC UA: NEGATIVE
LDH SERPL L TO P-CCNC: 0.89 MMOL/L (ref 0.5–2.2)
LEUKOCYTE EST, POC UA: NEGATIVE
NITRITE, POC UA: NEGATIVE
PCO2 BLDA: 54.7 MMHG (ref 35–45)
PH SMN: 7.34 [PH] (ref 7.35–7.45)
PH UR STRIP: 6.5 [PH]
PO2 BLDA: 13 MMHG (ref 40–60)
POC ALT (SGPT): 23 U/L (ref 10–47)
POC AST (SGOT): 24 U/L (ref 11–38)
POC B-TYPE NATRIURETIC PEPTIDE: 12 PG/ML (ref 0–100)
POC BE: 2 MMOL/L
POC CARDIAC TROPONIN I: 0 NG/ML
POC CARDIAC TROPONIN I: 0.01 NG/ML
POC PTINR: 1 (ref 0.9–1.2)
POC PTWBT: 11.9 SEC (ref 9.7–14.3)
POC SATURATED O2: 13 % (ref 95–100)
POC TCO2: 31 MMOL/L (ref 18–33)
POC TCO2: 31 MMOL/L (ref 24–29)
POCT GLUCOSE: 307 MG/DL (ref 70–110)
POCT GLUCOSE: 389 MG/DL (ref 70–110)
POTASSIUM BLD-SCNC: 4.4 MMOL/L (ref 3.6–5.1)
PROTEIN, POC UA: NEGATIVE
PROTEIN, POC: 8 G/DL (ref 6.4–8.1)
SAMPLE: ABNORMAL
SAMPLE: NORMAL
SITE: ABNORMAL
SODIUM BLD-SCNC: 142 MMOL/L (ref 128–145)
SPECIFIC GRAVITY, POC UA: 1.02
UROBILINOGEN, POC UA: 0.2 E.U./DL

## 2022-02-11 PROCEDURE — 90471 IMMUNIZATION ADMIN: CPT | Mod: ER | Performed by: EMERGENCY MEDICINE

## 2022-02-11 PROCEDURE — 81003 URINALYSIS AUTO W/O SCOPE: CPT | Mod: ER

## 2022-02-11 PROCEDURE — 82962 GLUCOSE BLOOD TEST: CPT | Mod: ER

## 2022-02-11 PROCEDURE — 25000003 PHARM REV CODE 250: Mod: ER | Performed by: EMERGENCY MEDICINE

## 2022-02-11 PROCEDURE — 93010 ELECTROCARDIOGRAM REPORT: CPT | Mod: ,,, | Performed by: INTERNAL MEDICINE

## 2022-02-11 PROCEDURE — 90715 TDAP VACCINE 7 YRS/> IM: CPT | Mod: ER | Performed by: EMERGENCY MEDICINE

## 2022-02-11 PROCEDURE — 99285 EMERGENCY DEPT VISIT HI MDM: CPT | Mod: 25,ER

## 2022-02-11 PROCEDURE — 80053 COMPREHEN METABOLIC PANEL: CPT | Mod: ER

## 2022-02-11 PROCEDURE — 63600175 PHARM REV CODE 636 W HCPCS: Mod: ER | Performed by: EMERGENCY MEDICINE

## 2022-02-11 PROCEDURE — 96361 HYDRATE IV INFUSION ADD-ON: CPT | Mod: ER

## 2022-02-11 PROCEDURE — 83880 ASSAY OF NATRIURETIC PEPTIDE: CPT | Mod: ER

## 2022-02-11 PROCEDURE — 93005 ELECTROCARDIOGRAM TRACING: CPT | Mod: ER

## 2022-02-11 PROCEDURE — 85610 PROTHROMBIN TIME: CPT | Mod: ER

## 2022-02-11 PROCEDURE — 93010 EKG 12-LEAD: ICD-10-PCS | Mod: ,,, | Performed by: INTERNAL MEDICINE

## 2022-02-11 PROCEDURE — 82803 BLOOD GASES ANY COMBINATION: CPT | Mod: ER

## 2022-02-11 PROCEDURE — 96374 THER/PROPH/DIAG INJ IV PUSH: CPT | Mod: ER

## 2022-02-11 PROCEDURE — 84484 ASSAY OF TROPONIN QUANT: CPT | Mod: ER

## 2022-02-11 PROCEDURE — 85025 COMPLETE CBC W/AUTO DIFF WBC: CPT | Mod: ER

## 2022-02-11 RX ORDER — IBUPROFEN 600 MG/1
600 TABLET ORAL EVERY 6 HOURS PRN
Qty: 20 TABLET | Refills: 0 | Status: SHIPPED | OUTPATIENT
Start: 2022-02-11 | End: 2022-03-31

## 2022-02-11 RX ORDER — MUPIROCIN 20 MG/G
OINTMENT TOPICAL 3 TIMES DAILY
Qty: 60 G | Refills: 0 | Status: SHIPPED | OUTPATIENT
Start: 2022-02-11 | End: 2022-02-21

## 2022-02-11 RX ORDER — KETOROLAC TROMETHAMINE 30 MG/ML
15 INJECTION, SOLUTION INTRAMUSCULAR; INTRAVENOUS
Status: COMPLETED | OUTPATIENT
Start: 2022-02-11 | End: 2022-02-11

## 2022-02-11 RX ORDER — ACETAMINOPHEN 500 MG
1000 TABLET ORAL EVERY 6 HOURS PRN
Qty: 30 TABLET | Refills: 0 | OUTPATIENT
Start: 2022-02-11 | End: 2022-11-06

## 2022-02-11 RX ORDER — METHOCARBAMOL 750 MG/1
1500 TABLET, FILM COATED ORAL 3 TIMES DAILY PRN
Qty: 30 TABLET | Refills: 0 | Status: SHIPPED | OUTPATIENT
Start: 2022-02-11 | End: 2022-02-16

## 2022-02-11 RX ADMIN — SODIUM CHLORIDE 1000 ML: 0.9 INJECTION, SOLUTION INTRAVENOUS at 09:02

## 2022-02-11 RX ADMIN — BACITRACIN, NEOMYCIN, POLYMYXIN B 1 EACH: 400; 3.5; 5 OINTMENT TOPICAL at 10:02

## 2022-02-11 RX ADMIN — KETOROLAC TROMETHAMINE 15 MG: 30 INJECTION, SOLUTION INTRAMUSCULAR at 09:02

## 2022-02-11 RX ADMIN — TETANUS TOXOID, REDUCED DIPHTHERIA TOXOID AND ACELLULAR PERTUSSIS VACCINE, ADSORBED 0.5 ML: 5; 2.5; 8; 8; 2.5 SUSPENSION INTRAMUSCULAR at 10:02

## 2022-02-11 NOTE — Clinical Note
"Kristine Valentini" Diana was seen and treated in our emergency department on 2/11/2022.  She may return to work on 02/13/2022.       If you have any questions or concerns, please don't hesitate to call.      Emily Quinn, DO"

## 2022-02-11 NOTE — ED PROVIDER NOTES
Encounter Date: 2/11/2022    SCRIBE #1 NOTE: I, Ephraim Del Toro, am scribing for, and in the presence of,  Emily Quinn DO. I have scribed the following portions of the note - Other sections scribed: HPI, ROS, PE.       History     Chief Complaint   Patient presents with    Head Injury     Pain to forehead/headache, sm scabbed lac, states was moving china cabinet Wednesday and it fell on her.  Headaches since.  Denies blurred vision or dizziness     Patient is a 61 year old female with PMHx of DM, HLD, and HTN who presents to ED with complaints of headaches and urinary frequency onset yesterday. She was moving a china cabinet on Wednesday when it suddenly fell on her head. Pain began one day after. She endorses taking Tylenol Extra Strength for pain with no relief (last dose yesterday). Patient has been taking her blood glucose medication but doesn't know why her sugar is still high. She denies fever, chills, chest pain, nausea, neck pain, lightheadedness, or dizziness. No other complaints at this time.     The history is provided by the patient. No  was used.     Review of patient's allergies indicates:   Allergen Reactions    Neosporin [benzalkonium chloride]      Rash       Past Medical History:   Diagnosis Date    Arthritis     Diabetes mellitus     High cholesterol     Hypertension      Past Surgical History:   Procedure Laterality Date    c cestion      KNEE ARTHROPLASTY       History reviewed. No pertinent family history.  Social History     Tobacco Use    Smoking status: Current Every Day Smoker     Packs/day: 0.50    Smokeless tobacco: Never Used   Substance Use Topics    Alcohol use: Not Currently    Drug use: Not Currently     Review of Systems   Constitutional: Negative for chills and fever.   HENT: Negative for sore throat.    Respiratory: Negative for shortness of breath.    Cardiovascular: Negative for chest pain.   Gastrointestinal: Negative for nausea.   Genitourinary:  Positive for frequency. Negative for dysuria.   Musculoskeletal: Negative for back pain and neck pain.   Skin: Negative for rash.   Neurological: Positive for headaches. Negative for dizziness, weakness and light-headedness.   Hematological: Does not bruise/bleed easily.   All other systems reviewed and are negative.      Physical Exam     Patient gave consent to have physical exam performed.  Initial Vitals [02/11/22 0809]   BP Pulse Resp Temp SpO2   119/79 85 18 98.8 °F (37.1 °C) 99 %      MAP       --         Physical Exam    Nursing note and vitals reviewed.  Constitutional: She appears well-developed and well-nourished.   HENT:   Head: Normocephalic. Head is with contusion.       Right Ear: External ear normal.   Left Ear: External ear normal.   Mouth/Throat: Mucous membranes are dry.   Contusion left forehead.  Healing scab.  Dry mucous membranes.     Eyes: Conjunctivae and EOM are normal. Pupils are equal, round, and reactive to light. Right eye exhibits no discharge. Left eye exhibits no discharge.   Neck: Neck supple.   Normal range of motion.  Cardiovascular: Normal rate, regular rhythm, normal heart sounds and intact distal pulses. Exam reveals no gallop and no friction rub.    No murmur heard.  Pulmonary/Chest: Breath sounds normal. No respiratory distress. She has no wheezes. She has no rhonchi. She has no rales. She exhibits no tenderness.   Abdominal: Abdomen is soft. Bowel sounds are normal. She exhibits no distension and no mass. There is no abdominal tenderness.   No CVA tenderness There is no rebound and no guarding.   Musculoskeletal:         General: No tenderness or edema. Normal range of motion.      Cervical back: Normal range of motion and neck supple.     Neurological: She is alert and oriented to person, place, and time.   Skin: Skin is warm and dry.   Psychiatric: She has a normal mood and affect.         ED Course   Procedures  Labs Reviewed   POCT URINALYSIS W/O SCOPE - Abnormal;  Notable for the following components:       Result Value    Glucose, UA 3+ (*)     All other components within normal limits   POCT GLUCOSE - Abnormal; Notable for the following components:    POCT Glucose 389 (*)     All other components within normal limits   ISTAT PROCEDURE - Abnormal; Notable for the following components:    POC PH 7.339 (*)     POC PCO2 54.7 (*)     POC PO2 13 (*)     POC HCO3 29.4 (*)     POC SATURATED O2 13 (*)     POC TCO2 31 (*)     All other components within normal limits   POCT CMP - Abnormal; Notable for the following components:    POC Glucose 373 (*)     All other components within normal limits   POCT GLUCOSE - Abnormal; Notable for the following components:    POCT Glucose 307 (*)     All other components within normal limits   TROPONIN ISTAT   TROPONIN ISTAT   POCT CBC   POCT URINALYSIS W/O SCOPE   POCT GLUCOSE, HAND-HELD DEVICE   POCT GLUCOSE, HAND-HELD DEVICE   POCT CMP   POCT PROTIME-INR   POCT TROPONIN   POCT B-TYPE NATRIURETIC PEPTIDE (BNP)   ISTAT PROCEDURE   POCT B-TYPE NATRIURETIC PEPTIDE (BNP)   Anion gap is 6                 Imaging Results          X-Ray Chest PA And Lateral (Final result)  Result time 02/11/22 09:22:00    Final result by Shahab Gill DO (02/11/22 09:22:00)                 Impression:      No acute cardiopulmonary abnormality.      Electronically signed by: Shahab Gill  Date:    02/11/2022  Time:    09:22             Narrative:    EXAMINATION:  XR CHEST PA AND LATERAL    CLINICAL HISTORY:  Chest Pain;    TECHNIQUE:  PA and lateral views of the chest were performed.    COMPARISON:  06/08/2022.    FINDINGS:  The lungs are well expanded and clear. No focal opacities are seen. The pleural spaces are clear.    The cardiac silhouette is unremarkable.    The visualized osseous structures are unremarkable.                               CT Cervical Spine Without Contrast (Final result)  Result time 02/11/22 09:24:37    Final result by Shahab Gill DO  (02/11/22 09:24:37)                 Impression:      No acute fracture or subluxation of the cervical spine.    Multilevel degenerative changes of the cervical spine as above.      Electronically signed by: Shahab Gill  Date:    02/11/2022  Time:    09:24             Narrative:    EXAMINATION:  CT CERVICAL SPINE WITHOUT CONTRAST    CLINICAL HISTORY:  Neck trauma, midline tenderness (Age 16-64y);    TECHNIQUE:  Low dose axial images, sagittal and coronal reformations were performed though the cervical spine without intravenous contrast.    COMPARISON:  None available.    FINDINGS:  Alignment: There is straightening of the usual cervical lordosis.  There is minimal grade 1 anterolisthesis of C4 on C5.  Alignment otherwise unremarkable.    Vertebra: There is no acute fracture or subluxation of the cervical spine.  The vertebral body heights are maintained.  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    Discs: There is mild disc height loss from C3 through C7.    Cord: The contents of the spinal canal are not well visualized on non-contrast CT.    Degenerative changes: There are multilevel degenerative changes of the cervical spine.  There are anterior osteophytes from C3 through C7.  There are posterior disc osteophyte complexes, most significant at the levels of C5-C6 and C6-C7.  There is no high-grade osseous spinal canal stenosis.  There is multilevel facet arthropathy and uncinate process spurring without high-grade osseous neural foraminal narrowing.    Miscellaneous: The soft tissues of the neck are unremarkable.  The visualized lung apices are clear.                               CT Head Without Contrast (Final result)  Result time 02/11/22 09:25:47    Final result by Shahab Gill DO (02/11/22 09:25:47)                 Impression:      No acute intracranial abnormality.      Electronically signed by: Shahab Gill  Date:    02/11/2022  Time:    09:25             Narrative:     EXAMINATION:  CT HEAD WITHOUT CONTRAST    CLINICAL HISTORY:  Head trauma, moderate-severe;    TECHNIQUE:  Low dose axial CT images obtained throughout the head without intravenous contrast. Sagittal and coronal reconstructions were performed.    COMPARISON:  CT head from 03/13/2020.    FINDINGS:  Ventricles and sulci are normal in size for age without evidence of hydrocephalus. No extra-axial blood or fluid collections.  The brain parenchyma is normal. No parenchymal mass, hemorrhage, edema or major vascular distribution infarct.    No calvarial fracture.  The scalp is unremarkable.  Bilateral paranasal sinuses and mastoid air cells are clear.  Anterior and posterior fusion anomalies of C1.                                 Medications   sodium chloride 0.9% bolus 1,000 mL (0 mLs Intravenous Stopped 2/11/22 1022)   ketorolac injection 15 mg (15 mg Intravenous Given 2/11/22 0956)   Tdap (BOOSTRIX) vaccine injection 0.5 mL (0.5 mLs Intramuscular Given 2/11/22 1019)   neomycin-bacitracnZn-polymyxnB packet (1 each Topical (Top) Given 2/11/22 1019)     Medical Decision Making:   History:   Old Medical Records: I decided to obtain old medical records.  Independently Interpreted Test(s):   I have ordered and independently interpreted X-rays - see prior notes.  Clinical Tests:   Lab Tests: Ordered and Reviewed  Radiological Study: Ordered and Reviewed  Chief complaint:  headaches and urinary frequency onset yesterday.  Differential diagnosis: My differential diagnosis includes, but is not limited to: hyperglycemia, hypoglycemia, headache, intractable headache, skull fracture, facial contusion, strain, sprain, contusion, and fracture.  Anion gap is within normal limits at 6.   Treatment in the ED: PE, Toradol, and IV fluids.        Patient reports feeling better after treatment in the ER.      Discussed treatment, prescriptions, labs, and imaging results.    Fill and take prescriptions as directed.  Return to the ED if  symptoms worsen or do not resolve.   Answered questions and discussed discharge plan.    Patient feels better and is ready for discharge.  Follow up with PCP/specialist in 1 day.        Scribe Attestation:   Scribe #1: I performed the above scribed service and the documentation accurately describes the services I performed. I attest to the accuracy of the note.                    I, Dr. Emily Quinn, personally performed the services described in this documentation. This document was produced by a scribe under my direction and in my presence. All medical record entries made by the scribe were at my direction and in my presence.  I have reviewed the chart and agree that the record reflects my personal performance and is accurate and complete. Emily Quinn, DO.     02/11/2022 10:44 AM    Clinical Impression:   Final diagnoses:  [I10] HTN (hypertension)  [S00.83XA] Contusion of forehead, initial encounter (Primary)  [S00.81XA] Abrasion of forehead, initial encounter  [R73.9] Hyperglycemia          ED Disposition Condition    Discharge Stable        ED Prescriptions     Medication Sig Dispense Start Date End Date Auth. Provider    ibuprofen (ADVIL,MOTRIN) 600 MG tablet Take 1 tablet (600 mg total) by mouth every 6 (six) hours as needed for Pain (Take with food as needed for mild-to-moderate pain). 20 tablet 2/11/2022  Emily Quinn DO    acetaminophen (TYLENOL) 500 MG tablet Take 2 tablets (1,000 mg total) by mouth every 6 (six) hours as needed for Pain. 30 tablet 2/11/2022  Emily Quinn DO    methocarbamoL (ROBAXIN) 750 MG Tab Take 2 tablets (1,500 mg total) by mouth 3 (three) times daily as needed (As needed for pain and muscle spasm). 30 tablet 2/11/2022 2/16/2022 Emily Quinn DO    mupirocin (BACTROBAN) 2 % ointment Apply topically 3 (three) times daily. for 10 days 60 g 2/11/2022 2/21/2022 Emily Quinn DO        Follow-up Information     Follow up With Specialties Details Why Contact Info    Daron PIERRE  MD [facility] Schedule an appointment as soon as possible for a visit in 1 day  [address]  [place] [address]  [phone]      [facility] - Emergency Dept Emergency Medicine Go to  Please go to [facility] emergency department if symptoms worsen [address] [place] [address]  [phone]           Emily Quinn DO  02/11/22 1045

## 2022-03-24 ENCOUNTER — TELEPHONE (OUTPATIENT)
Dept: NEUROLOGY | Facility: CLINIC | Age: 62
End: 2022-03-24
Payer: MEDICAID

## 2022-03-25 RX ORDER — CYCLOBENZAPRINE HCL 5 MG
5 TABLET ORAL NIGHTLY
COMMUNITY
End: 2022-03-25 | Stop reason: SDUPTHER

## 2022-03-28 RX ORDER — CYCLOBENZAPRINE HCL 5 MG
5 TABLET ORAL NIGHTLY
Qty: 30 TABLET | Refills: 2 | Status: SHIPPED | OUTPATIENT
Start: 2022-03-28 | End: 2022-04-14 | Stop reason: ALTCHOICE

## 2022-03-31 ENCOUNTER — HOSPITAL ENCOUNTER (EMERGENCY)
Facility: HOSPITAL | Age: 62
Discharge: HOME | End: 2022-03-31
Attending: EMERGENCY MEDICINE
Payer: MEDICAID

## 2022-03-31 VITALS
DIASTOLIC BLOOD PRESSURE: 71 MMHG | RESPIRATION RATE: 18 BRPM | SYSTOLIC BLOOD PRESSURE: 156 MMHG | TEMPERATURE: 98 F | HEIGHT: 62 IN | OXYGEN SATURATION: 99 % | BODY MASS INDEX: 23.55 KG/M2 | HEART RATE: 94 BPM | WEIGHT: 128 LBS

## 2022-03-31 DIAGNOSIS — B02.9 HERPES ZOSTER WITHOUT COMPLICATION: Primary | ICD-10-CM

## 2022-03-31 PROCEDURE — 99284 EMERGENCY DEPT VISIT MOD MDM: CPT | Mod: ER

## 2022-03-31 RX ORDER — VALACYCLOVIR HYDROCHLORIDE 1 G/1
1000 TABLET, FILM COATED ORAL 3 TIMES DAILY
Qty: 21 TABLET | Refills: 0 | Status: SHIPPED | OUTPATIENT
Start: 2022-03-31 | End: 2022-04-07

## 2022-03-31 RX ORDER — TRAMADOL HYDROCHLORIDE 50 MG/1
50 TABLET ORAL EVERY 6 HOURS PRN
Qty: 12 TABLET | Refills: 0 | Status: SHIPPED | OUTPATIENT
Start: 2022-03-31 | End: 2022-04-10

## 2022-03-31 NOTE — ED PROVIDER NOTES
Encounter Date: 3/31/2022       History     Chief Complaint   Patient presents with    Rash     Right hip/flank raised rash with blistering since Monday. Painful and itches.  Been taking Tylenol and Benadryl for it.      This patient presents to the emergency department with 2-3 days of painful itching rash to the right hip flank area radiating into her her medial thigh.  Patient describes a raised rash with blistering.  Patient had chickenpox as a child.  Patient has not had vaccination against shingles.    The history is provided by the patient.     Review of patient's allergies indicates:   Allergen Reactions    Neosporin [benzalkonium chloride]      Rash       Past Medical History:   Diagnosis Date    Arthritis     Diabetes mellitus     High cholesterol     Hypertension      Past Surgical History:   Procedure Laterality Date    c cestion      KNEE ARTHROPLASTY       No family history on file.  Social History     Tobacco Use    Smoking status: Current Every Day Smoker     Packs/day: 0.50    Smokeless tobacco: Never Used   Substance Use Topics    Alcohol use: Not Currently    Drug use: Not Currently     Review of Systems   Constitutional: Negative.    HENT: Negative.    Eyes: Negative.    Respiratory: Negative.    Cardiovascular: Negative.    Gastrointestinal: Negative.    Endocrine: Negative.    Genitourinary: Negative.    Musculoskeletal: Negative.    Skin: Positive for rash.   Allergic/Immunologic: Negative.    Neurological: Negative.    Hematological: Negative.    Psychiatric/Behavioral: Negative.    All other systems reviewed and are negative.      Physical Exam     Initial Vitals [03/31/22 0620]   BP Pulse Resp Temp SpO2   (!) 156/71 94 18 98.2 °F (36.8 °C) 99 %      MAP       --         Physical Exam    Nursing note and vitals reviewed.  Constitutional: Vital signs are normal. She appears well-developed. She is active and cooperative.   HENT:   Head: Normocephalic and atraumatic.   Eyes:  Conjunctivae, EOM and lids are normal. Pupils are equal, round, and reactive to light.   Neck: Trachea normal. Neck supple. No thyroid mass present.    Full passive range of motion without pain.     Cardiovascular: Normal rate, regular rhythm, S1 normal, S2 normal, normal heart sounds, intact distal pulses and normal pulses.   Abdominal: Abdomen is soft. Bowel sounds are normal.   Musculoskeletal:         General: Normal range of motion.      Cervical back: Full passive range of motion without pain and neck supple.     Lymphadenopathy:     She has no axillary adenopathy.   Neurological: She is alert and oriented to person, place, and time.   Skin: Skin is warm, dry and intact. Rash noted. Rash is maculopapular and vesicular.        Psychiatric: She has a normal mood and affect. Her speech is normal and behavior is normal. Judgment and thought content normal. Cognition and memory are normal.         ED Course   Procedures  Labs Reviewed - No data to display       Imaging Results    None          Medications - No data to display                       Clinical Impression:   Final diagnoses:  [B02.9] Herpes zoster without complication (Primary)          ED Disposition Condition    Discharge Stable        ED Prescriptions     Medication Sig Dispense Start Date End Date Auth. Provider    valACYclovir (VALTREX) 1000 MG tablet Take 1 tablet (1,000 mg total) by mouth 3 (three) times daily. for 7 days 21 tablet 3/31/2022 4/7/2022 Dev Ahuja MD    traMADoL (ULTRAM) 50 mg tablet Take 1 tablet (50 mg total) by mouth every 6 (six) hours as needed for Pain. 12 tablet 3/31/2022 4/10/2022 Dev Ahuja MD        Follow-up Information     Follow up With Specialties Details Why Contact Info    Daron Slaughter MD General Practice Schedule an appointment as soon as possible for a visit in 1 week  [address] [facility]  [place] [address]  [phone]             Dev Ahuja MD  04/02/22 1888

## 2022-04-14 ENCOUNTER — HOSPITAL ENCOUNTER (EMERGENCY)
Facility: HOSPITAL | Age: 62
Discharge: HOME | End: 2022-04-14
Attending: EMERGENCY MEDICINE
Payer: MEDICAID

## 2022-04-14 VITALS
WEIGHT: 125 LBS | HEIGHT: 62 IN | TEMPERATURE: 98 F | SYSTOLIC BLOOD PRESSURE: 145 MMHG | DIASTOLIC BLOOD PRESSURE: 84 MMHG | BODY MASS INDEX: 23 KG/M2 | HEART RATE: 88 BPM | OXYGEN SATURATION: 100 % | RESPIRATION RATE: 20 BRPM

## 2022-04-14 DIAGNOSIS — M25.559 HIP PAIN: ICD-10-CM

## 2022-04-14 DIAGNOSIS — Z86.19 HISTORY OF SHINGLES: ICD-10-CM

## 2022-04-14 DIAGNOSIS — M25.562 LEFT KNEE PAIN: ICD-10-CM

## 2022-04-14 DIAGNOSIS — M19.90 ARTHRITIS: Primary | ICD-10-CM

## 2022-04-14 LAB — POCT GLUCOSE: 334 MG/DL (ref 70–110)

## 2022-04-14 PROCEDURE — 99283 EMERGENCY DEPT VISIT LOW MDM: CPT | Mod: ER

## 2022-04-14 PROCEDURE — 82962 GLUCOSE BLOOD TEST: CPT | Mod: ER

## 2022-04-14 RX ORDER — TRAMADOL HYDROCHLORIDE 50 MG/1
50 TABLET ORAL EVERY 6 HOURS PRN
Qty: 15 TABLET | Refills: 0 | OUTPATIENT
Start: 2022-04-14 | End: 2023-10-09

## 2022-04-14 NOTE — ED PROVIDER NOTES
Encounter Date: 4/14/2022       History     Chief Complaint   Patient presents with    Knee Pain    Hip Pain     PT C/O LEFT KNEE PAIN AND RIGHT HIP PAIN FOR 2 WEEKS, REPORTS SEEN HERE 2 WEEKS AGO AND AT PCP 1 WEEK AGO. REPORTS TRAZADONE WORKS BUT GABAPENTIN DOESN'T     61-year-old patient with a history of diabetes hypertension arthritis and smoking, who is recently diagnosed with shingles at her right hip.  Patient says the pain in her right hip from her shingles is improved but still not resolved.  She said when she had Ultram for that rash it did help.  No new blisters on the rash no fever no chills the rash does not itch  The hip pain is in the location of the rash, not particularly worse with movement.  The pain is minimal, about a 4/10.  The knee pain has been gradually getting worse over the past 2 weeks and feels like her arthritis.  It seemed to get worse yesterday.  She denies any subjective swelling or erythema.  No history of gout.  No new trauma.  The pain is a nagging pain.  She says she got gabapentin from her primary care doctor for it and it has not helped.        Review of patient's allergies indicates:   Allergen Reactions    Neosporin [benzalkonium chloride]      Rash       Past Medical History:   Diagnosis Date    Arthritis     Diabetes mellitus     High cholesterol     Hypertension      Past Surgical History:   Procedure Laterality Date    c cestion      KNEE ARTHROPLASTY       No family history on file.  Social History     Tobacco Use    Smoking status: Current Every Day Smoker     Packs/day: 0.50    Smokeless tobacco: Never Used   Substance Use Topics    Alcohol use: Not Currently    Drug use: Not Currently     Review of Systems   Constitutional: Negative for chills and fever.   HENT: Negative for congestion.    Eyes: Negative for photophobia.   Respiratory: Negative for cough and shortness of breath.    Cardiovascular: Negative for leg swelling.   Gastrointestinal: Negative for  abdominal pain.   Endocrine: Negative for polyuria.   Genitourinary: Negative for flank pain.   Musculoskeletal: Negative for gait problem.   Skin: Positive for rash.   Neurological: Negative for headaches.   Psychiatric/Behavioral: Negative for confusion.       Physical Exam     Initial Vitals [04/14/22 0642]   BP Pulse Resp Temp SpO2   (!) 145/84 88 20 98.2 °F (36.8 °C) 100 %      MAP       --         Physical Exam    Nursing note and vitals reviewed.  Constitutional: She appears well-developed.   HENT:   Head: Normocephalic.   Eyes: Pupils are equal, round, and reactive to light.   Neck:   Normal range of motion.  Cardiovascular: Normal rate.   Pulmonary/Chest: Breath sounds normal.   Abdominal: Abdomen is soft.   Musculoskeletal:         General: Normal range of motion.      Cervical back: Normal range of motion.      Comments: Left knee stable without swelling erythema effusion or point tenderness.  No deformity.  No warmth.  No laxity.  No calf pain or swelling.  No Homans sign.     Neurological: She is alert and oriented to person, place, and time.   Skin: Skin is warm. Capillary refill takes less than 2 seconds. Rash noted.        Psychiatric: She has a normal mood and affect.         ED Course   Procedures  Labs Reviewed   POCT GLUCOSE - Abnormal; Notable for the following components:       Result Value    POCT Glucose 334 (*)     All other components within normal limits   POCT GLUCOSE MONITORING CONTINUOUS          Imaging Results          X-Ray Knee 3 View Left (In process)                X-Ray Hip 2 or 3 views Right (with Pelvis when performed) (In process)               X-Rays:   Independently Interpreted Readings:   Other Readings:  Hip x-ray-no fracture or dislocation  Knee x-ray-DJD, no fracture or dislocation, no effusion    Medications - No data to display                       Clinical Impression:   Final diagnoses:  [M25.562] Left knee pain  [M25.559] Hip pain  [M19.90] Arthritis  (Primary)  [Z86.19] History of shingles          ED Disposition Condition    Discharge Stable        ED Prescriptions     Medication Sig Dispense Start Date End Date Auth. Provider    traMADoL (ULTRAM) 50 mg tablet Take 1 tablet (50 mg total) by mouth every 6 (six) hours as needed for Pain. 15 tablet 4/14/2022  Jessica Mejia MD        Follow-up Information    None          Jessica Mejia MD  04/14/22 0758

## 2022-07-19 ENCOUNTER — PATIENT MESSAGE (OUTPATIENT)
Dept: RESEARCH | Facility: CLINIC | Age: 62
End: 2022-07-19
Payer: MEDICAID

## 2022-11-06 ENCOUNTER — HOSPITAL ENCOUNTER (EMERGENCY)
Facility: HOSPITAL | Age: 62
Discharge: HOME | End: 2022-11-06
Attending: EMERGENCY MEDICINE
Payer: MEDICAID

## 2022-11-06 VITALS
HEART RATE: 82 BPM | HEIGHT: 62 IN | OXYGEN SATURATION: 100 % | RESPIRATION RATE: 17 BRPM | SYSTOLIC BLOOD PRESSURE: 132 MMHG | BODY MASS INDEX: 22.45 KG/M2 | DIASTOLIC BLOOD PRESSURE: 84 MMHG | WEIGHT: 122 LBS | TEMPERATURE: 99 F

## 2022-11-06 DIAGNOSIS — J30.9 ALLERGIC RHINITIS, UNSPECIFIED SEASONALITY, UNSPECIFIED TRIGGER: ICD-10-CM

## 2022-11-06 DIAGNOSIS — J20.9 ACUTE BRONCHITIS, UNSPECIFIED ORGANISM: Primary | ICD-10-CM

## 2022-11-06 DIAGNOSIS — R73.9 HYPERGLYCEMIA: ICD-10-CM

## 2022-11-06 LAB
INFLUENZA A ANTIGEN, POC: NEGATIVE
INFLUENZA B ANTIGEN, POC: NEGATIVE
POCT GLUCOSE: 317 MG/DL (ref 70–110)

## 2022-11-06 PROCEDURE — 25000003 PHARM REV CODE 250: Mod: ER | Performed by: EMERGENCY MEDICINE

## 2022-11-06 PROCEDURE — 99284 EMERGENCY DEPT VISIT MOD MDM: CPT | Mod: 25,ER

## 2022-11-06 PROCEDURE — 87502 INFLUENZA DNA AMP PROBE: CPT | Mod: 59,ER

## 2022-11-06 PROCEDURE — 82962 GLUCOSE BLOOD TEST: CPT | Mod: ER

## 2022-11-06 RX ORDER — FLUTICASONE PROPIONATE 50 MCG
1 SPRAY, SUSPENSION (ML) NASAL 2 TIMES DAILY PRN
Qty: 15 G | Refills: 0 | Status: SHIPPED | OUTPATIENT
Start: 2022-11-06 | End: 2022-11-20

## 2022-11-06 RX ORDER — LORATADINE 10 MG/1
10 TABLET ORAL DAILY
Qty: 30 TABLET | Refills: 0 | OUTPATIENT
Start: 2022-11-06 | End: 2023-10-09

## 2022-11-06 RX ORDER — ACETAMINOPHEN 500 MG
1000 TABLET ORAL
Status: COMPLETED | OUTPATIENT
Start: 2022-11-06 | End: 2022-11-06

## 2022-11-06 RX ORDER — PROMETHAZINE HYDROCHLORIDE AND DEXTROMETHORPHAN HYDROBROMIDE 6.25; 15 MG/5ML; MG/5ML
5 SYRUP ORAL 3 TIMES DAILY PRN
Qty: 180 ML | Refills: 0 | Status: SHIPPED | OUTPATIENT
Start: 2022-11-06 | End: 2022-11-16

## 2022-11-06 RX ORDER — DOXYCYCLINE 100 MG/1
100 CAPSULE ORAL 2 TIMES DAILY
Qty: 20 CAPSULE | Refills: 0 | Status: SHIPPED | OUTPATIENT
Start: 2022-11-06 | End: 2022-11-16

## 2022-11-06 RX ADMIN — ACETAMINOPHEN 1000 MG: 500 TABLET ORAL at 12:11

## 2022-11-06 NOTE — ED PROVIDER NOTES
Encounter Date: 11/6/2022    SCRIBE #1 NOTE: I, Nora Farris, am scribing for, and in the presence of,  LILIAN Paniagua. I have scribed the following portions of the note - Other sections scribed: HPI, ROS, PE.     History     Chief Complaint   Patient presents with    Cough     PT PRESENTS TO ED WITH C/O PRODUCTIVE COUGH(YELLOW PHLEGM) AND FATIGUE X 4 DAYS.      61 y.o. female with PMHx of DM, HTN, and High cholesterol who presents to the ED for chief complaint of productive cough with yellow phlegm, sneezing, and fatigue for 4 days. Patient denies rash, fever, chest pain, SOB, numbness, weakness, tingling, abdominal pain, back pain, dysuria, hematuria, nausea, vomiting, diarrhea, or any other complaints.  Patient denies pain and has not taken any medications for the symptoms. No Alleviating/aggravating factors.  Patient endorses smoking. She takes Metformin daily but did not take her dose today PTA. She has had her flu shot. Drug allergy to Neosporin.     The history is provided by the patient. No  was used.   Review of patient's allergies indicates:   Allergen Reactions    Neosporin [benzalkonium chloride]      Rash       Past Medical History:   Diagnosis Date    Arthritis     Diabetes mellitus     High cholesterol     Hypertension      Past Surgical History:   Procedure Laterality Date    c cestion      KNEE ARTHROPLASTY       History reviewed. No pertinent family history.  Social History     Tobacco Use    Smoking status: Every Day     Packs/day: 0.50     Types: Cigarettes    Smokeless tobacco: Never   Substance Use Topics    Alcohol use: Not Currently    Drug use: Not Currently     Review of Systems   Constitutional:  Positive for fatigue. Negative for chills and fever.   HENT:  Positive for sneezing. Negative for congestion, ear pain, rhinorrhea, sore throat and trouble swallowing.    Eyes:  Negative for pain, discharge and redness.   Respiratory:  Positive for cough. Negative for shortness  of breath.    Cardiovascular:  Negative for chest pain.   Gastrointestinal:  Negative for abdominal pain, diarrhea, nausea and vomiting.   Genitourinary:  Negative for decreased urine volume and dysuria.   Musculoskeletal:  Negative for back pain, neck pain and neck stiffness.   Skin:  Negative for rash.   Neurological:  Negative for dizziness, weakness, light-headedness, numbness and headaches.   Psychiatric/Behavioral:  Negative for confusion.      Physical Exam     Initial Vitals [11/06/22 1021]   BP Pulse Resp Temp SpO2   138/87 81 18 97.5 °F (36.4 °C) 98 %      MAP       --         Physical Exam    Nursing note and vitals reviewed.  Constitutional: Vital signs are normal. She appears well-developed.  Non-toxic appearance. She does not appear ill.   HENT:   Head: Normocephalic and atraumatic.   Right Ear: Tympanic membrane and ear canal normal.   Left Ear: Tympanic membrane and ear canal normal.   Nose: Mucosal edema present.   Mouth/Throat: Uvula is midline, oropharynx is clear and moist and mucous membranes are normal.   Mucosal edema.   Eyes: Conjunctivae are normal.   Neck: No Brudzinski's sign and no Kernig's sign noted.   Normal range of motion.  Cardiovascular:  Normal rate and regular rhythm.           Pulmonary/Chest: Effort normal and breath sounds normal. She exhibits no tenderness.   Abdominal: Abdomen is soft. There is no abdominal tenderness.   Musculoskeletal:      Cervical back: Normal range of motion.     Lymphadenopathy:     She has no cervical adenopathy.   Neurological: She is alert and oriented to person, place, and time. Gait normal. GCS eye subscore is 4. GCS verbal subscore is 5. GCS motor subscore is 6.   Skin: Skin is warm, dry and intact. No rash noted.   Psychiatric: She has a normal mood and affect. Her speech is normal and behavior is normal. Judgment and thought content normal.       ED Course   Procedures  Labs Reviewed   POCT GLUCOSE - Abnormal; Notable for the following  components:       Result Value    POCT Glucose 317 (*)     All other components within normal limits   POCT INFLUENZA A/B MOLECULAR   POCT GLUCOSE, HAND-HELD DEVICE   POCT RAPID INFLUENZA A/B          Imaging Results              X-Ray Chest PA And Lateral (Final result)  Result time 11/06/22 10:54:26      Final result by Lyndon Montelongo MD (11/06/22 10:54:26)                   Impression:      No acute abnormality.      Electronically signed by: Lyndon Montelongo MD  Date:    11/06/2022  Time:    10:54               Narrative:    EXAMINATION:  XR CHEST PA AND LATERAL    CLINICAL HISTORY:  cough;    TECHNIQUE:  PA and lateral views of the chest were performed.    COMPARISON:  02/11/2022    FINDINGS:  The lungs are clear, with normal appearance of pulmonary vasculature.No pleural effusion.No pneumothorax.    The cardiac silhouette is normal in size. The hilar and mediastinal contours are unremarkable.    Bones are intact.                                       Medications - No data to display  Medical Decision Making:   History:   Old Medical Records: I decided to obtain old medical records.  Clinical Tests:   Lab Tests: Ordered and Reviewed  Radiological Study: Ordered and Reviewed     APC / Resident Notes:   This is an urgent evaluation of a 61 y.o. female that presents to the Emergency Department for URI Symptoms for 4 days.  The patient is a non-toxic, afebrile, and well appearing female. On physical exam: Ears: without infection.  Pharynx without infection. Appears well hydrated with moist mucus membranes. Neck soft and supple with no meningeal signs or cervical lymphadenopathy.  Breath sounds are clear and equal bilaterally with no adventitious breath sounds, tachypnea or respiratory distress.  Oxygen saturation is 98% on Room Air, no evidence of hypoxia.     Vital Signs: 138/87, 97.5, 81, 18, 98%  Lab\Radiology\Other Procedure RESULTS: CXR negative for infection; , patient did not take meds PTA, would  prefer to go home to take meds  Flu: Negative    Given the above findings, my overall impression is Bronchitis, allergic rhinitis, hyperglycemia. Given the above findings, I do not think the patient has OM, OE, strep pharyngitis, meningitis, pneumonia, bacterial sinusitis, or significant dehydration requiring IV fluids or admission    The patient will be discharged home with Doxy, Claritin, Flonase, phenergan DM. Additional home care recommendations include Tylenol/Ibuprofen PRN, Hydration, return precautions. The diagnosis, treatment plan, instructions for follow-up and reevaluation with her PCP as well as ED return precautions have been discussed with the patient and she has verbalized an understanding of the information. All questions or concerns from the patient have been addressed.        Scribe Attestation:   Scribe #1: I performed the above scribed service and the documentation accurately describes the services I performed. I attest to the accuracy of the note.                 I, BECCA Paniagua, personally performed the services described in this documentation.  All medical record entries made by the scribe were at my direction and in my presence.  I have reviewed the chart and agree that the record reflects my personal performance and is accurate and complete.  Clinical Impression:   Final diagnoses:  [J20.9] Acute bronchitis, unspecified organism (Primary)  [J30.9] Allergic rhinitis, unspecified seasonality, unspecified trigger  [R73.9] Hyperglycemia      ED Disposition Condition    Discharge Stable          ED Prescriptions       Medication Sig Dispense Start Date End Date Auth. Provider    doxycycline (VIBRAMYCIN) 100 MG Cap Take 1 capsule (100 mg total) by mouth 2 (two) times daily. for 10 days 20 capsule 11/6/2022 11/16/2022 LILIAN Sesay    promethazine-dextromethorphan (PROMETHAZINE-DM) 6.25-15 mg/5 mL Syrp Take 5 mLs by mouth 3 (three) times daily as needed (cough). 180 mL 11/6/2022 11/16/2022  LILIAN Sesay    loratadine (CLARITIN) 10 mg tablet Take 1 tablet (10 mg total) by mouth once daily. 30 tablet 11/6/2022 12/6/2022 LILIAN Sesay    fluticasone propionate (FLONASE) 50 mcg/actuation nasal spray 1 spray (50 mcg total) by Each Nostril route 2 (two) times daily as needed for Rhinitis or Allergies. 15 g 11/6/2022 11/20/2022 LILIAN Sesay          Follow-up Information       Follow up With Specialties Details Why Contact Info    Daron Slaughter MD General Practice Schedule an appointment as soon as possible for a visit in 2 days  [address] [facility] [address]  [phone]      [facility] ED Emergency Medicine Go to  If symptoms worsen [address] [facility] [address] [phone]             LILIAN Sesay  11/06/22 1217

## 2022-12-07 ENCOUNTER — HOSPITAL ENCOUNTER (EMERGENCY)
Facility: HOSPITAL | Age: 62
Discharge: HOME | End: 2022-12-07
Attending: EMERGENCY MEDICINE
Payer: MEDICAID

## 2022-12-07 VITALS
TEMPERATURE: 99 F | SYSTOLIC BLOOD PRESSURE: 148 MMHG | OXYGEN SATURATION: 99 % | HEIGHT: 62 IN | WEIGHT: 122 LBS | BODY MASS INDEX: 22.45 KG/M2 | RESPIRATION RATE: 18 BRPM | HEART RATE: 78 BPM | DIASTOLIC BLOOD PRESSURE: 70 MMHG

## 2022-12-07 DIAGNOSIS — E11.65 HYPERGLYCEMIA DUE TO DIABETES MELLITUS: Primary | ICD-10-CM

## 2022-12-07 LAB
ALBUMIN SERPL-MCNC: 3.4 G/DL (ref 3.3–5.5)
ALP SERPL-CCNC: 83 U/L (ref 42–141)
BILIRUB SERPL-MCNC: 0.5 MG/DL (ref 0.2–1.6)
BILIRUBIN, POC UA: NEGATIVE
BLOOD, POC UA: NEGATIVE
BUN SERPL-MCNC: 9 MG/DL (ref 7–22)
CALCIUM SERPL-MCNC: 10 MG/DL (ref 8–10.3)
CHLORIDE SERPL-SCNC: 99 MMOL/L (ref 98–108)
CLARITY, POC UA: CLEAR
COLOR, POC UA: ABNORMAL
CREAT SERPL-MCNC: 0.6 MG/DL (ref 0.6–1.2)
CTP QC/QA: YES
GLUCOSE SERPL-MCNC: 472 MG/DL (ref 73–118)
GLUCOSE, POC UA: ABNORMAL
INFLUENZA A ANTIGEN, POC: NEGATIVE
INFLUENZA B ANTIGEN, POC: NEGATIVE
KETONES, POC UA: NEGATIVE
LEUKOCYTE EST, POC UA: NEGATIVE
NITRITE, POC UA: NEGATIVE
PH UR STRIP: 6 [PH]
POC ALT (SGPT): 31 U/L (ref 10–47)
POC AST (SGOT): 26 U/L (ref 11–38)
POC RAPID STREP A: NEGATIVE
POC TCO2: 33 MMOL/L (ref 18–33)
POCT GLUCOSE: 183 MG/DL (ref 70–110)
POCT GLUCOSE: 329 MG/DL (ref 70–110)
POCT GLUCOSE: 500 MG/DL (ref 70–110)
POTASSIUM BLD-SCNC: 5.1 MMOL/L (ref 3.6–5.1)
PROTEIN, POC UA: NEGATIVE
PROTEIN, POC: 7.4 G/DL (ref 6.4–8.1)
SARS-COV-2 RDRP RESP QL NAA+PROBE: NEGATIVE
SODIUM BLD-SCNC: 145 MMOL/L (ref 128–145)
SPECIFIC GRAVITY, POC UA: 1.01
UROBILINOGEN, POC UA: 0.2 E.U./DL

## 2022-12-07 PROCEDURE — 99284 EMERGENCY DEPT VISIT MOD MDM: CPT | Mod: 25,ER

## 2022-12-07 PROCEDURE — 81003 URINALYSIS AUTO W/O SCOPE: CPT | Mod: ER

## 2022-12-07 PROCEDURE — 96360 HYDRATION IV INFUSION INIT: CPT | Mod: ER

## 2022-12-07 PROCEDURE — 96361 HYDRATE IV INFUSION ADD-ON: CPT | Mod: ER

## 2022-12-07 PROCEDURE — 87635 SARS-COV-2 COVID-19 AMP PRB: CPT | Mod: ER | Performed by: NURSE PRACTITIONER

## 2022-12-07 PROCEDURE — 82962 GLUCOSE BLOOD TEST: CPT | Mod: ER

## 2022-12-07 PROCEDURE — 63600175 PHARM REV CODE 636 W HCPCS: Mod: ER | Performed by: EMERGENCY MEDICINE

## 2022-12-07 PROCEDURE — 85025 COMPLETE CBC W/AUTO DIFF WBC: CPT | Mod: ER

## 2022-12-07 PROCEDURE — 25000003 PHARM REV CODE 250: Mod: ER | Performed by: EMERGENCY MEDICINE

## 2022-12-07 PROCEDURE — 80053 COMPREHEN METABOLIC PANEL: CPT | Mod: ER

## 2022-12-07 PROCEDURE — 87804 INFLUENZA ASSAY W/OPTIC: CPT | Mod: ER

## 2022-12-07 RX ADMIN — INSULIN HUMAN 6 UNITS: 100 INJECTION, SOLUTION PARENTERAL at 01:12

## 2022-12-07 RX ADMIN — SODIUM CHLORIDE 1000 ML: 0.9 INJECTION, SOLUTION INTRAVENOUS at 09:12

## 2022-12-07 NOTE — Clinical Note
"Kristine Donis" Diana was seen and treated in our emergency department on 12/7/2022.  She may return to work on 12/08/2022.       If you have any questions or concerns, please don't hesitate to call.      Cesar SEPULVEDA    "

## 2022-12-07 NOTE — ED PROVIDER NOTES
Encounter Date: 12/7/2022    SCRIBE #1 NOTE: I, Nora Farris, am scribing for, and in the presence of,  Ct Adorno MD. I have scribed the following portions of the note - Other sections scribed: HPI, ROS, PE.     History     Chief Complaint   Patient presents with    Generalized Body Aches     Patient reports generalized body aches onset yesterday.  Denies cough, denies uri symptoms.      61 y.o. female with PMHx of DM, HLD, and HTN who presents to the ED for chief complaint of chills and generalized myalgias that began yesterday and improved slightly today. Patient attempted treatment with Tylenol. Patient further endorses polydipsia and polyuria. She reports cbg of 374 mg/dL after eating yesterday. Patient endorses compliance with her DM medications, but is noncompliant with dietary restrictions. She denies any recent medications changes and notes that she is changing PCPs. Patient denies any associated cough, congestion, or rhinorrhea. Patient smokes cigarettes daily.     The history is provided by the patient. No  was used.   Review of patient's allergies indicates:   Allergen Reactions    Neosporin [benzalkonium chloride]      Rash       Past Medical History:   Diagnosis Date    Arthritis     Diabetes mellitus     High cholesterol     Hypertension      Past Surgical History:   Procedure Laterality Date    c cestion      KNEE ARTHROPLASTY       History reviewed. No pertinent family history.  Social History     Tobacco Use    Smoking status: Every Day     Packs/day: 0.50     Types: Cigarettes    Smokeless tobacco: Never   Substance Use Topics    Alcohol use: Not Currently    Drug use: Not Currently     Review of Systems   Constitutional:  Positive for chills. Negative for activity change, appetite change and fever.   HENT:  Negative for congestion, rhinorrhea, sneezing and sore throat.    Respiratory:  Negative for cough, choking, shortness of breath and wheezing.    Cardiovascular:  Negative  for chest pain and palpitations.   Gastrointestinal:  Negative for abdominal pain, diarrhea, nausea and vomiting.   Endocrine: Positive for polydipsia and polyuria.   Musculoskeletal:  Positive for myalgias (generalized).   Neurological:  Negative for dizziness, syncope, light-headedness and headaches.   All other systems reviewed and are negative.    Physical Exam     Initial Vitals [12/07/22 0826]   BP Pulse Resp Temp SpO2   135/89 83 18 98.7 °F (37.1 °C) 99 %      MAP       --         Physical Exam    Nursing note and vitals reviewed.  Constitutional: She appears well-developed and well-nourished. No distress.   HENT:   Head: Normocephalic and atraumatic.   Eyes: Conjunctivae are normal.   Neck:   Normal range of motion.  Cardiovascular:  Normal rate, regular rhythm and normal heart sounds.           No murmur heard.  Pulmonary/Chest: Breath sounds normal. No respiratory distress.   Abdominal: Bowel sounds are normal. She exhibits no distension.   Musculoskeletal:         General: Normal range of motion.      Cervical back: Normal range of motion.     Neurological: She is alert and oriented to person, place, and time.   Skin: Skin is warm and dry.   Psychiatric: She has a normal mood and affect. Her behavior is normal.       ED Course   Procedures  Labs Reviewed   POCT URINALYSIS W/O SCOPE - Abnormal; Notable for the following components:       Result Value    Glucose, UA 3+ (*)     All other components within normal limits   POCT GLUCOSE - Abnormal; Notable for the following components:    POCT Glucose 500 (*)     All other components within normal limits   POCT CMP - Abnormal; Notable for the following components:    POC Glucose 472 (*)     All other components within normal limits   POCT GLUCOSE - Abnormal; Notable for the following components:    POCT Glucose 329 (*)     All other components within normal limits   POCT GLUCOSE - Abnormal; Notable for the following components:    POCT Glucose 183 (*)     All  other components within normal limits   SARS-COV-2 RDRP GENE    Narrative:     This test utilizes isothermal nucleic acid amplification technology to detect the SARS-CoV-2 RdRp nucleic acid segment. The analytical sensitivity (limit of detection) is 500 copies/swab.     A POSITIVE result is indicative of the presence of SARS-CoV-2 RNA; clinical correlation with patient history and other diagnostic information is necessary to determine patient infection status.    A NEGATIVE result means that SARS-CoV-2 nucleic acids are not present above the limit of detection. A NEGATIVE result should be treated as presumptive. It does not rule out the possibility of COVID-19 and should not be the sole basis for treatment decisions. If COVID-19 is strongly suspected based on clinical and exposure history, re-testing using an alternate molecular assay should be considered.     This test is only for use under the Food and Drug Administration s Emergency Use Authorization (EUA).     Commercial kits are provided by [employer]. Performance characteristics of the EUA have been independently verified by [facility] Department of Pathology and Laboratory Medicine.   _________________________________________________________________   The authorized Fact Sheet for Healthcare Providers and the authorized Fact Sheet for Patients of the ID NOW COVID-19 are available on the FDA website:    https://www.fda.gov/media/629541/download      https://www.fda.gov/media/874303/download      POCT URINALYSIS W/O SCOPE   POCT CBC   POCT INFLUENZA A/B MOLECULAR   POCT STREP A MOLECULAR   POCT GLUCOSE, HAND-HELD DEVICE   POCT STREP A, RAPID   POCT CMP   POCT RAPID INFLUENZA A/B            Imaging Results    None          Medications   sodium chloride 0.9% bolus 1,000 mL (1,000 mLs Intravenous New Bag 12/7/22 6641)   sodium chloride 0.9% bolus 1,000 mL (1,000 mLs Intravenous Bolus from Bag 12/7/22 7849)   insulin regular injection 6 Units  0.06 mL (6 Units Intravenous Given 12/7/22 1344)     Medical Decision Making:   History:   Old Medical Records: I decided to obtain old medical records.  Clinical Tests:   Lab Tests: Ordered and Reviewed  Myalgias and chills - pt has hyperglycemia with hx of DM. No covid, no flu. No dehydration or DKA. Pt was given IVFs and insulin. Glucose improved and pt felt much better.        Scribe Attestation:   Scribe #1: I performed the above scribed service and the documentation accurately describes the services I performed. I attest to the accuracy of the note.                 I, Dr. Ct Adorno, personally performed the services described in this documentation.   All medical record entries made by the scribe were at my direction and in my presence.   I have reviewed the chart and agree that the record is accurate and complete.   Ct Adorno MD.  10:34 AM 12/07/2022   Clinical Impression:   Final diagnoses:  [E11.65] Hyperglycemia due to diabetes mellitus (Primary)      ED Disposition Condition    Discharge Stable          ED Prescriptions    None       Follow-up Information    None          Ct Adorno MD  12/07/22 9770

## 2022-12-07 NOTE — DISCHARGE INSTRUCTIONS
You were feeling bad because your blood sugar was severely elevated. Be sure to take your medicine every day. Read the attached handouts to learn what to eat. Follow up with your doctor to have your A1c checked.

## 2023-01-17 ENCOUNTER — HOSPITAL ENCOUNTER (EMERGENCY)
Facility: HOSPITAL | Age: 63
Discharge: HOME | End: 2023-01-17
Attending: EMERGENCY MEDICINE
Payer: MEDICAID

## 2023-01-17 VITALS
RESPIRATION RATE: 18 BRPM | HEIGHT: 61 IN | DIASTOLIC BLOOD PRESSURE: 80 MMHG | HEART RATE: 84 BPM | BODY MASS INDEX: 21.9 KG/M2 | OXYGEN SATURATION: 98 % | SYSTOLIC BLOOD PRESSURE: 131 MMHG | WEIGHT: 116 LBS | TEMPERATURE: 98 F

## 2023-01-17 DIAGNOSIS — R07.9 CHEST PAIN: ICD-10-CM

## 2023-01-17 DIAGNOSIS — R07.9 CHEST PAIN, UNSPECIFIED TYPE: Primary | ICD-10-CM

## 2023-01-17 LAB
ALBUMIN SERPL-MCNC: 3.5 G/DL (ref 3.3–5.5)
ALP SERPL-CCNC: 77 U/L (ref 42–141)
BILIRUB SERPL-MCNC: 0.7 MG/DL (ref 0.2–1.6)
BILIRUBIN, POC UA: NEGATIVE
BLOOD, POC UA: NEGATIVE
BUN SERPL-MCNC: 8 MG/DL (ref 7–22)
CALCIUM SERPL-MCNC: 9.3 MG/DL (ref 8–10.3)
CHLORIDE SERPL-SCNC: 105 MMOL/L (ref 98–108)
CLARITY, POC UA: CLEAR
COLOR, POC UA: YELLOW
CREAT SERPL-MCNC: 0.5 MG/DL (ref 0.6–1.2)
GLUCOSE SERPL-MCNC: 190 MG/DL (ref 73–118)
GLUCOSE, POC UA: ABNORMAL
KETONES, POC UA: NEGATIVE
LEUKOCYTE EST, POC UA: NEGATIVE
NITRITE, POC UA: NEGATIVE
PH UR STRIP: 6 [PH]
POC ALT (SGPT): 14 U/L (ref 10–47)
POC AST (SGOT): 21 U/L (ref 11–38)
POC B-TYPE NATRIURETIC PEPTIDE: 6.3 PG/ML (ref 0–100)
POC CARDIAC TROPONIN I: 0.01 NG/ML (ref 0–0.08)
POC TCO2: 30 MMOL/L (ref 18–33)
POCT GLUCOSE: 182 MG/DL (ref 70–110)
POTASSIUM BLD-SCNC: 3.9 MMOL/L (ref 3.6–5.1)
PROTEIN, POC UA: NEGATIVE
PROTEIN, POC: 7.2 G/DL (ref 6.4–8.1)
SAMPLE: NORMAL
SODIUM BLD-SCNC: 145 MMOL/L (ref 128–145)
SPECIFIC GRAVITY, POC UA: 1.01
UROBILINOGEN, POC UA: 0.2 E.U./DL

## 2023-01-17 PROCEDURE — 81003 URINALYSIS AUTO W/O SCOPE: CPT | Mod: ER

## 2023-01-17 PROCEDURE — 99284 EMERGENCY DEPT VISIT MOD MDM: CPT | Mod: 25,ER

## 2023-01-17 PROCEDURE — 80053 COMPREHEN METABOLIC PANEL: CPT | Mod: ER

## 2023-01-17 PROCEDURE — 82962 GLUCOSE BLOOD TEST: CPT | Mod: ER

## 2023-01-17 PROCEDURE — 93010 EKG 12-LEAD: ICD-10-PCS | Mod: ,,, | Performed by: INTERNAL MEDICINE

## 2023-01-17 PROCEDURE — 93005 ELECTROCARDIOGRAM TRACING: CPT | Mod: ER

## 2023-01-17 PROCEDURE — 93010 ELECTROCARDIOGRAM REPORT: CPT | Mod: ,,, | Performed by: INTERNAL MEDICINE

## 2023-01-17 PROCEDURE — 85025 COMPLETE CBC W/AUTO DIFF WBC: CPT | Mod: ER

## 2023-01-17 PROCEDURE — 84484 ASSAY OF TROPONIN QUANT: CPT | Mod: ER

## 2023-01-17 PROCEDURE — 83880 ASSAY OF NATRIURETIC PEPTIDE: CPT | Mod: ER

## 2023-01-17 RX ORDER — FAMOTIDINE 20 MG/1
20 TABLET, FILM COATED ORAL 2 TIMES DAILY PRN
Qty: 20 TABLET | Refills: 0 | Status: SHIPPED | OUTPATIENT
Start: 2023-01-17 | End: 2024-01-17

## 2023-01-17 NOTE — DISCHARGE INSTRUCTIONS
Thank you for coming to our Emergency Department today. It is important to remember that some problems or medical conditions are difficult to diagnose and may not be found during your Emergency Department visit.     Be sure to follow up with your primary care doctor and review all labs/imaging/tests that were performed during your ER visit with them. Some labs/tests may be outside of the normal range and require non-emergent follow-up and further investigation to help diagnose/exclude/prevent complications or other potentially serious medical conditions that were not addressed during your ER visit.    If you do not have a primary care doctor, you may contact the one listed on your discharge paperwork or you may also call the [facility] Appointment Desk at [phone] to schedule an appointment and establish care with one. It is important to your health that you have a primary care doctor.    Please take all medications as directed. All medications may potentially have side-effects and it is impossible to predict which medications may give you side-effects or what side-effects (if any) they will give you.. If you feel that you are having a negative effect or side-effect of any medication you should immediately stop taking them and seek medical attention. If you feel that you are having a life-threatening reaction call 911.    Return to the ER with any questions/concerns, new/concerning symptoms, worsening or failure to improve.     Do not drive, swim, climb to height, take a bath, operate heavy machinery, drink alcohol or take potentially sedating medications, sign any legal documents or make any important decisions for 24 hours if you have received any pain medications, sedatives or mood altering drugs during your ER visit or within 24 hours of taking them if they have been prescribed to you.     You can find additional resources for Dentists, hearing aids, durable medical equipment, low cost pharmacies and  other resources at [email or web address].org    BELOW THIS LINE ONLY APPLIES IF YOU HAVE A COVID TEST PENDING OR IF YOU HAVE BEEN DIAGNOSED WITH COVID:  Please access [facility] to review the results of your test. Until the results of your COVID test return, you should isolate yourself so as not to potentially spread illness to others.   If your COVID test returns positive, you should isolate yourself so as not to spread illness to others. After five full days, if you are feeling better and you have not had fever for 24 hours, you can return to your typical daily activities, but you must wear a mask around others for an additional 5 days.   If your COVID test returns negative and you are either unvaccinated or more than six months out from your two-dose vaccine and are not yet boosted, you should still quarantine for 5 full days followed by strict mask use for an additional 5 full days.   If your COVID test returns negative and you have received your 2-dose initial vaccine as well as a booster, you should continue strict mask use for 10 full days after the exposure.  For all those exposed, best practice includes a test at day 5 after the exposure. This can be a home test or a test through one of the many testing centers throughout our community.   Masking is always advised to limit the spread of COVID. Cdc.gov is an excellent site to obtain the latest up to date recommendations regarding COVID and COVID testing.     CDC Testing and Quarantine Guidelines for patients with exposure to a known-positive COVID-19 person:  A close exposure is defined as anyone who has had an exposure (masked or unmasked) to a known COVID -19 positive person within 6 feet of someone for a cumulative total of 15 minutes or more over a 24-hour period.   Vaccinated and/or if you recently had a positive covid test within 90 days do NOT need to quarantine after contact with someone who had COVID-19 unless you develop symptoms.   Fully vaccinated  people who have not had a positive test within 90 days, should get tested 3-5 days after their exposure, even if they don't have symptoms and wear a mask indoors in public for 14 days following exposure or until their test result is negative.      Unvaccinated and/or NOT had a positive test within 90 days and meet close exposure  You are required by CDC guidelines to quarantine for at least 5 days from time of exposure followed by 5 days of strict masking. It is recommended, but not required to test after 5 days, unless you develop symptoms, in which case you should test at that time.  If you get tested after 5 days and your test is positive, your 5 day period of isolation starts the day of the positive test.    If your exposure does not meet the above definition, you can return to your normal daily activities to include social distancing, wearing a mask and frequent handwashing.      Here is a link to guidance from the CDC:  https://www.cdc.gov/media/releases/2021/s1227-isolation-quarantine-guidance.html      [place] Dept Of Health Testing Sites:  https://ldh.la.gov/page/3934      [facility] website with testing locations and guidance:  [email or web address]

## 2023-01-17 NOTE — ED PROVIDER NOTES
Encounter Date: 1/17/2023    SCRIBE #1 NOTE: I, Kitty Knott, am scribing for, and in the presence of,  Jennifer Zapata NP. I have scribed the following portions of the note - Other sections scribed: HPI; ROS.     History     Chief Complaint   Patient presents with    Chest Pain     Intermittent mid sternal chest pain since yesterday.      Kristine Ortiz is a 62 y.o. female with Hx of DM, HLD, and HTN who presents to the ED for chief complaint of intermittent, burning midsternum chest pain onset 1 day ago. Patient reports around 2 PM yesterday while she was sitting down she began to have pain. She attempted treatment with Tums with some relief. Patient notes the last episode of pain occurred around 3 AM today and was alleviated after drinking water. She notes she did not eat anything unusual or spicy yesterday. Patient denies associated abdominal pain, nausea, vomiting, diarrhea, cough, shortness of breath, fever, or chills. No further complaints at this time. Patient reports her blood sugar fluctuates and notes it was in the 200's yesterday. She states she did take her DM and HTN medications today. Patient smokes about 1/2 a pack of cigarettes daily and has been using tobacco for over 40 years. She does not use EtOH or recreational drugs.       The history is provided by the patient. No  was used.   Review of patient's allergies indicates:   Allergen Reactions    Neosporin [benzalkonium chloride]      Rash       Past Medical History:   Diagnosis Date    Arthritis     Diabetes mellitus     High cholesterol     Hypertension      Past Surgical History:   Procedure Laterality Date    c cestion      KNEE ARTHROPLASTY       No family history on file.  Social History     Tobacco Use    Smoking status: Every Day     Packs/day: 0.50     Types: Cigarettes    Smokeless tobacco: Never   Substance Use Topics    Alcohol use: Not Currently    Drug use: Not Currently     Review of Systems   Constitutional:   Negative for activity change, appetite change, chills, fatigue and fever.   HENT:  Negative for congestion, sore throat, trouble swallowing and voice change.    Eyes:  Negative for photophobia, pain, redness and visual disturbance.   Respiratory:  Negative for cough, chest tightness, shortness of breath and wheezing.    Cardiovascular:  Positive for chest pain. Negative for palpitations and leg swelling.   Gastrointestinal:  Negative for abdominal distention, abdominal pain, anal bleeding, constipation, diarrhea, nausea and vomiting.   Endocrine: Negative for polydipsia, polyphagia and polyuria.   Genitourinary:  Negative for difficulty urinating, dysuria, frequency, hematuria, urgency, vaginal bleeding and vaginal discharge.   Musculoskeletal:  Negative for arthralgias and myalgias.   Skin:  Negative for rash and wound.   Neurological:  Negative for dizziness, weakness, light-headedness and headaches.   Hematological:  Negative for adenopathy.     Physical Exam     Initial Vitals [01/17/23 0816]   BP Pulse Resp Temp SpO2   137/68 89 18 98.8 °F (37.1 °C) 98 %      MAP       --         Physical Exam    Constitutional: She appears well-developed and well-nourished. She is not diaphoretic. No distress.   HENT:   Head: Normocephalic and atraumatic.   Neck:   Normal range of motion.  Cardiovascular:  Normal rate, regular rhythm, normal heart sounds and intact distal pulses.           Pulses:       Radial pulses are 2+ on the right side and 2+ on the left side.        Dorsalis pedis pulses are 2+ on the right side and 2+ on the left side.   Pulmonary/Chest: Breath sounds normal. No respiratory distress.   Abdominal: Abdomen is soft. Bowel sounds are normal. There is no abdominal tenderness.   Musculoskeletal:         General: Normal range of motion.      Cervical back: Normal range of motion.     Neurological: She is alert and oriented to person, place, and time.   Skin: Skin is warm and dry.   Psychiatric: She has a  normal mood and affect. Her behavior is normal.       ED Course   Procedures  Labs Reviewed   POCT URINALYSIS W/O SCOPE - Abnormal; Notable for the following components:       Result Value    Glucose, UA 3+ (*)     All other components within normal limits   POCT GLUCOSE - Abnormal; Notable for the following components:    POCT Glucose 182 (*)     All other components within normal limits   POCT CMP - Abnormal; Notable for the following components:    POC Creatinine 0.5 (*)     POC Glucose 190 (*)     All other components within normal limits   TROPONIN ISTAT   POCT URINALYSIS W/O SCOPE   POCT CBC   POCT CMP   POCT TROPONIN   POCT B-TYPE NATRIURETIC PEPTIDE (BNP)   POCT B-TYPE NATRIURETIC PEPTIDE (BNP)       EKG Readings: (Independently Interpreted)   Initial Reading: No STEMI. Rhythm: Normal Sinus Rhythm. Heart Rate: 88. Ectopy: No Ectopy. Conduction: Normal.   Reviewed by Dr. Cline     Imaging Results              X-Ray Chest PA And Lateral (Final result)  Result time 01/17/23 09:56:18      Final result by Amol Still MD (01/17/23 09:56:18)                   Impression:      No acute chest disease identified.      Electronically signed by: Amol Still MD  Date:    01/17/2023  Time:    09:56               Narrative:    EXAMINATION:  XR CHEST PA AND LATERAL    CLINICAL HISTORY:  Chest Pain;    TECHNIQUE:  PA and lateral views of the chest were performed.    COMPARISON:  11/06/2022.    FINDINGS:  The heart and mediastinal structures are unremarkable.  Pulmonary vasculature is within normal limits.  The lungs are well aerated and free of focal consolidations.  There is no evidence for pneumothorax or pleural effusions.  Bony structures are grossly intact.                                       Medications - No data to display  Medical Decision Making:   History:   Old Medical Records: I decided to obtain old medical records.  Independently Interpreted Test(s):   I have ordered and independently interpreted EKG  Reading(s) - see prior notes  Clinical Tests:   Lab Tests: Ordered and Reviewed  Medical Tests: Ordered and Reviewed  ED Management:  62-year-old patient with hx of hypertension, hyperlipidemia, diabetes presenting for evaluation of chest pain that has occurred intermittently since yesterday.  Currently denies any chest pain.  Last episode of chest pain was at 3:00 a.m. this morning and resolved after drinking water. EKG was reassuring and chest xray showed nothing acute.  Labs reassuring.  Troponin 0.01.     [email or web address]  Also considered but less likely:     PE: normal rate, no sob/recent immobilization/surgery/travel/family history.   Pneumonia: chest xray negative. No fever. No cough and lungs non consistent with pna  Tamponade: unlikely due to chest xray and ekg  STEMI: No STEMI on ekg  Dissection: equal pulses bilaterally and no ripping chest pain to the back  Esophageal rupture: no dysphagia or vomiting and chest xray negative for mediastinal air  Arrhythmia: no arrhythmia on ekg  CHF: no fluid overload on Cxr and physical exam  Pneumothorax: bilateral breath sounds and no signs of pneumothorax on chest xray     Patient remains chest pain-free in the ED.  I suspect GI cause as it improved with Tums, however I will refer her to Cardiology. Patient was discharged with follow up with Dr Vazquez. Return precautions given, patient understands and agrees with plan. All questions answered.   Additional MDM:   Heart Score:    History:          Slightly suspicious.  ECG:             Normal  Age:               45-65 years  Risk factors: >= 3 risk factors or history of atherosclerotic disease  Troponin:       Less than or equal to normal limit  Final Score: 3       Scribe Attestation:   Scribe #1: I performed the above scribed service and the documentation accurately describes the services I performed. I attest to the accuracy of the note.                 Scribe attestation: I M  Benny personally performed the services described in this documentation.  All medical record entries made by the scribe were at my direction and in my presence.  I have reviewed the chart and agree that the record reflects my personal performance and is accurate and complete.    Clinical Impression:   Final diagnoses:  [R07.9] Chest pain  [R07.9] Chest pain, unspecified type (Primary)        ED Disposition Condition    Discharge Stable          ED Prescriptions       Medication Sig Dispense Start Date End Date Auth. Provider    famotidine (PEPCID) 20 MG tablet Take 1 tablet (20 mg total) by mouth 2 (two) times daily as needed for Heartburn. 20 tablet 1/17/2023 1/17/2024 Jennifer Zapata NP          Follow-up Information       Follow up With Specialties Details Why Contact Info    Daron Slaughter MD General Practice Schedule an appointment as soon as possible for a visit  For follow-up [address] [facility] [id number] [phone]      Venkatesh Vazquez MD Cardiology, Interventional Cardiology Schedule an appointment as soon as possible for a visit  For follow-up [address] [facility] [address]      [facility] ED Emergency Medicine Go to  If symptoms worsen [address] [facility] [address] [phone]             Jennifer Zapata NP  01/17/23 8548

## 2023-01-17 NOTE — ED TRIAGE NOTES
Kristine Ortiz, a 62 y.o. female presents to the ED via PV with CC of midsternal chest pain that started yesterday after walking at a parade. Pt also states she is under a lot of stress. Pt denies N/V/D or SOB.

## 2023-01-17 NOTE — Clinical Note
"Kristine Valentini" Diana was seen and treated in our emergency department on 1/17/2023.  She may return to work on 01/18/2023.       If you have any questions or concerns, please don't hesitate to call.      Jennifer Zapata NP"

## 2023-03-05 ENCOUNTER — HOSPITAL ENCOUNTER (EMERGENCY)
Facility: HOSPITAL | Age: 63
Discharge: HOME | End: 2023-03-05
Attending: EMERGENCY MEDICINE
Payer: MEDICAID

## 2023-03-05 VITALS
RESPIRATION RATE: 20 BRPM | HEIGHT: 61 IN | SYSTOLIC BLOOD PRESSURE: 145 MMHG | DIASTOLIC BLOOD PRESSURE: 97 MMHG | HEART RATE: 89 BPM | TEMPERATURE: 98 F | OXYGEN SATURATION: 99 % | BODY MASS INDEX: 21.9 KG/M2 | WEIGHT: 116 LBS

## 2023-03-05 DIAGNOSIS — U07.1 COVID-19: Primary | ICD-10-CM

## 2023-03-05 LAB
CTP QC/QA: YES
SARS-COV-2 RDRP RESP QL NAA+PROBE: POSITIVE

## 2023-03-05 PROCEDURE — 99283 EMERGENCY DEPT VISIT LOW MDM: CPT | Mod: ER

## 2023-03-05 RX ORDER — NIRMATRELVIR AND RITONAVIR 300-100 MG
KIT ORAL
Qty: 30 TABLET | Refills: 0 | Status: SHIPPED | OUTPATIENT
Start: 2023-03-05 | End: 2023-03-05 | Stop reason: SDUPTHER

## 2023-03-05 RX ORDER — NIRMATRELVIR AND RITONAVIR 300-100 MG
KIT ORAL
Qty: 30 TABLET | Refills: 0 | Status: SHIPPED | OUTPATIENT
Start: 2023-03-05 | End: 2023-03-10

## 2023-03-05 NOTE — Clinical Note
"Kristine"Patricia Ortiz was seen and treated in our emergency department on 3/5/2023.     COVID-19 is present in our communities across the state. There is limited testing for COVID at this time, so not all patients can be tested. In this situation, your employee meets the following criteria:    Kristine Ortiz has met the criteria for COVID-19 testing and has a POSITIVE result. She can return to work once they are asymptomatic for 24 hours without the use of fever reducing medications AND at least five days from the first positive result. A mask is recommended for 5 days post quarantine.     If you have any questions or concerns, or if I can be of further assistance, please do not hesitate to contact me.    Sincerely,              RN"

## 2023-03-06 NOTE — ED PROVIDER NOTES
Encounter Date: 3/5/2023    SCRIBE #1 NOTE: I, Carly Arroyo, am scribing for, and in the presence of,  Dr. Dev Ahuja. I have scribed the following portions of the note - Other sections scribed: HPI/ROS/PE.     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 female, with a PMHx of DM, HTN, HLD, Arthritis, who presents to the ED with Covid-19 concerns. Pt reports having multiple positive COVID-19 tests today at work. 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 Pt is here for official documentation of positive test. Pt reports cough. Patient has no other complaints at the present time.    The history is provided by the patient.   Review of patient's allergies indicates:   Allergen Reactions    Neosporin [benzalkonium chloride]      Rash       Past Medical History:   Diagnosis Date    Arthritis     Diabetes mellitus     High cholesterol     Hypertension      Past Surgical History:   Procedure Laterality Date    c cestion      KNEE ARTHROPLASTY       History reviewed. No pertinent family history.  Social History     Tobacco Use    Smoking status: Every Day     Packs/day: 0.50     Types: Cigarettes    Smokeless tobacco: Never   Substance Use Topics    Alcohol use: Not Currently    Drug use: Not Currently     Review of Systems   Constitutional: Negative.  Negative for fever.   HENT: Negative.  Negative for sore throat.    Eyes: Negative.    Respiratory:  Positive for cough. Negative for shortness of breath.    Cardiovascular: Negative.  Negative for chest pain.   Gastrointestinal: Negative.  Negative for nausea and vomiting.   Endocrine: Negative.    Genitourinary: Negative.  Negative for  dysuria.   Musculoskeletal: Negative.  Negative for myalgias.   Skin: Negative.  Negative for rash.   Allergic/Immunologic: Negative.    Neurological: Negative.  Negative for headaches.   Hematological: Negative.  Negative for adenopathy.   Psychiatric/Behavioral: Negative.  Negative for behavioral problems.    All other systems reviewed and are negative.    Physical Exam     Initial Vitals [03/05/23 2136]   BP Pulse Resp Temp SpO2   (!) 145/97 89 20 98.3 °F (36.8 °C) 99 %      MAP       --         Physical Exam    Nursing note and vitals reviewed.  Constitutional: She appears well-developed and well-nourished.   HENT:   Head: Normocephalic and atraumatic.   Right Ear: External ear normal.   Left Ear: External ear normal.   Nose: Nose normal.   Eyes: Conjunctivae are normal.   Neck: Neck supple.   Normal range of motion.  Cardiovascular:  Normal rate and intact distal pulses.     Exam reveals no gallop and no friction rub.       No murmur heard.  Pulmonary/Chest: Effort normal. No respiratory distress.   Active cough.   Abdominal: Abdomen is soft and flat. There is no abdominal tenderness.   Musculoskeletal:         General: Normal range of motion.      Cervical back: Normal range of motion and neck supple.     Neurological: She is alert and oriented to person, place, and time.   Skin: Skin is warm and dry. Capillary refill takes less than 2 seconds.   Psychiatric: She has a normal mood and affect. Her behavior is normal.       ED Course   Procedures  Labs Reviewed   SARS-COV-2 RDRP GENE - Abnormal; Notable for the following components:       Result Value    POC Rapid COVID Positive (*)     All other components within normal limits    Narrative:     This test utilizes isothermal nucleic acid amplification technology to detect the SARS-CoV-2 RdRp nucleic acid segment. The analytical sensitivity (limit of detection) is 500 copies/swab.     A POSITIVE result is indicative of the presence of SARS-CoV-2 RNA; clinical  correlation with patient history and other diagnostic information is necessary to determine patient infection status.    A NEGATIVE result means that SARS-CoV-2 nucleic acids are not present above the limit of detection. A NEGATIVE result should be treated as presumptive. It does not rule out the possibility of COVID-19 and should not be the sole basis for treatment decisions. If COVID-19 is strongly suspected based on clinical and exposure history, re-testing using an alternate molecular assay should be considered.     This test is only for use under the Food and Drug Administration s Emergency Use Authorization (EUA).     Commercial kits are provided by [employer]. Performance characteristics of the EUA have been independently verified by [facility] Department of Pathology and Laboratory Medicine.   _________________________________________________________________   The authorized Fact Sheet for Healthcare Providers and the authorized Fact Sheet for Patients of the ID NOW COVID-19 are available on the FDA website:    https://www.fda.gov/media/646383/download      https://www.fda.gov/media/136708/download             Results for orders placed or performed during the hospital encounter of 03/05/23   POCT COVID-19 Rapid Screening   Result Value Ref Range    POC Rapid COVID Positive (A) Negative     Acceptable Yes          Imaging Results    None          Medications - No data to display  Medical Decision Making:   History:   Old Medical Records: I decided to obtain old medical records.  Clinical Tests:   Lab Tests: Ordered and Reviewed  ED Management:  This patient presented to the emergency department with upper respiratory symptomatology.  The patient's differential included allergic, viral and bacterial etiologies.  Testing was done via swab to confirm presence or absence of treatable URI symptoms to include strep, influenza, and COVID-19 if necessarily ordered.  The patient was  educated on there diagnoses and provided with medications to treat their illness.  The patient was positive for COVID 19 subsequently she was evaluated and found to have a COVID score of 3.  I offered the patient plaques COVID and she agreed to taking this medicine.        Scribe Attestation:   Scribe #1: I performed the above scribed service and the documentation accurately describes the services I performed. I attest to the accuracy of the note.                   Clinical Impression:   Final diagnoses:  [U07.1] COVID-19 (Primary)       This document was produced by a scribe under my direction and in my presence. I agree with the content of the note and have made any necessary edits.     Dev Ahuja MD    03/06/2023 12:56 AM     ED Disposition Condition    Discharge Stable          ED Prescriptions       Medication Sig Dispense Start Date End Date Auth. Provider    nirmatrelvir-ritonavir (PAXLOVID, EUA,) 300 mg (150 mg x 2)-100 mg copackaged tablets (EUA)  (Status: Discontinued) Take 3 tablets by mouth 2 (two) times daily. Each dose contains 2 nirmatrelvir (pink tablets) and 1 ritonavir (white tablet). Take all 3 tablets together 30 tablet 3/5/2023 3/5/2023 Dev Ahuja MD    nirmatrelvir-ritonavir (PAXLOVID, EUA,) 300 mg (150 mg x 2)-100 mg copackaged tablets (EUA) Take 3 tablets by mouth 2 (two) times daily. Each dose contains 2 nirmatrelvir (pink tablets) and 1 ritonavir (white tablet). Take all 3 tablets together 30 tablet 3/5/2023 3/10/2023 Dev Ahuja MD          Follow-up Information       Follow up With Specialties Details Why Contact Info    Daron Slaughter MD General Practice Schedule an appointment as soon as possible for a visit in 1 week  [address] [facility]  [place] [address] [phone]               Dev Ahuja MD  03/06/23 0056

## 2023-03-30 ENCOUNTER — HOSPITAL ENCOUNTER (EMERGENCY)
Facility: HOSPITAL | Age: 63
Discharge: HOME | End: 2023-03-30
Attending: EMERGENCY MEDICINE
Payer: MEDICAID

## 2023-03-30 VITALS
HEART RATE: 78 BPM | DIASTOLIC BLOOD PRESSURE: 78 MMHG | WEIGHT: 126 LBS | BODY MASS INDEX: 23.81 KG/M2 | OXYGEN SATURATION: 100 % | SYSTOLIC BLOOD PRESSURE: 153 MMHG | TEMPERATURE: 98 F | RESPIRATION RATE: 19 BRPM

## 2023-03-30 DIAGNOSIS — M62.838 MUSCLE SPASM: Primary | ICD-10-CM

## 2023-03-30 DIAGNOSIS — R07.9 CHEST PAIN: ICD-10-CM

## 2023-03-30 LAB
ALBUMIN SERPL BCP-MCNC: 3.1 G/DL (ref 3.5–5.2)
ALBUMIN SERPL-MCNC: 3.1 G/DL (ref 3.3–5.5)
ALBUMIN SERPL-MCNC: 3.4 G/DL (ref 3.3–5.5)
ALBUMIN SERPL-MCNC: 3.6 G/DL (ref 3.3–5.5)
ALLENS TEST: ABNORMAL
ALP SERPL-CCNC: 53 U/L (ref 42–141)
ALP SERPL-CCNC: 57 U/L (ref 55–135)
ALP SERPL-CCNC: 63 U/L (ref 42–141)
ALP SERPL-CCNC: 64 U/L (ref 42–141)
ALT SERPL W/O P-5'-P-CCNC: 8 U/L (ref 10–44)
ANION GAP SERPL CALC-SCNC: 8 MMOL/L (ref 8–16)
AST SERPL-CCNC: 10 U/L (ref 10–40)
BILIRUB SERPL-MCNC: 0.2 MG/DL (ref 0.1–1)
BILIRUB SERPL-MCNC: 0.5 MG/DL (ref 0.2–1.6)
BILIRUB SERPL-MCNC: 0.5 MG/DL (ref 0.2–1.6)
BILIRUB SERPL-MCNC: 0.6 MG/DL (ref 0.2–1.6)
BILIRUBIN, POC UA: NEGATIVE
BLOOD, POC UA: NEGATIVE
BUN SERPL-MCNC: 11 MG/DL (ref 7–22)
BUN SERPL-MCNC: 11 MG/DL (ref 8–23)
BUN SERPL-MCNC: 12 MG/DL (ref 7–22)
BUN SERPL-MCNC: 12 MG/DL (ref 7–22)
CALCIUM SERPL-MCNC: 8.2 MG/DL (ref 8.7–10.5)
CALCIUM SERPL-MCNC: 8.9 MG/DL (ref 8–10.3)
CALCIUM SERPL-MCNC: 8.9 MG/DL (ref 8–10.3)
CALCIUM SERPL-MCNC: 9.6 MG/DL (ref 8–10.3)
CHLORIDE SERPL-SCNC: 110 MMOL/L (ref 98–108)
CHLORIDE SERPL-SCNC: 113 MMOL/L (ref 98–108)
CHLORIDE SERPL-SCNC: 114 MMOL/L (ref 95–110)
CHLORIDE SERPL-SCNC: 114 MMOL/L (ref 98–108)
CLARITY, POC UA: CLEAR
CO2 SERPL-SCNC: 22 MMOL/L (ref 23–29)
COLOR, POC UA: YELLOW
CREAT SERPL-MCNC: 0.6 MG/DL (ref 0.6–1.2)
CREAT SERPL-MCNC: 0.7 MG/DL (ref 0.5–1.4)
CREAT SERPL-MCNC: 0.7 MG/DL (ref 0.6–1.2)
CREAT SERPL-MCNC: 0.9 MG/DL (ref 0.6–1.2)
EST. GFR  (NO RACE VARIABLE): >60 ML/MIN/1.73 M^2
GLUCOSE SERPL-MCNC: 110 MG/DL (ref 73–118)
GLUCOSE SERPL-MCNC: 75 MG/DL (ref 70–110)
GLUCOSE SERPL-MCNC: 95 MG/DL (ref 73–118)
GLUCOSE SERPL-MCNC: 97 MG/DL (ref 73–118)
GLUCOSE, POC UA: ABNORMAL
HCO3 UR-SCNC: 25.3 MMOL/L (ref 24–28)
KETONES, POC UA: NEGATIVE
LDH SERPL L TO P-CCNC: 0.66 MMOL/L (ref 0.5–2.2)
LEUKOCYTE EST, POC UA: NEGATIVE
NITRITE, POC UA: NEGATIVE
OSMOLALITY UR: 451 MOSM/KG (ref 50–1200)
PCO2 BLDA: 41.1 MMHG (ref 35–45)
PH SMN: 7.4 [PH] (ref 7.35–7.45)
PH UR STRIP: 6 [PH]
PO2 BLDA: 49 MMHG (ref 40–60)
POC ALT (SGPT): 10 U/L (ref 10–47)
POC ALT (SGPT): 12 U/L (ref 10–47)
POC ALT (SGPT): 8 U/L (ref 10–47)
POC AST (SGOT): 15 U/L (ref 11–38)
POC AST (SGOT): 17 U/L (ref 11–38)
POC AST (SGOT): 21 U/L (ref 11–38)
POC BE: 0 MMOL/L
POC CARDIAC TROPONIN I: 0 NG/ML (ref 0–0.08)
POC SATURATED O2: 84 % (ref 95–100)
POC TCO2: 25 MMOL/L (ref 18–33)
POC TCO2: 25 MMOL/L (ref 18–33)
POC TCO2: 27 MMOL/L (ref 18–33)
POC TCO2: 27 MMOL/L (ref 24–29)
POTASSIUM BLD-SCNC: 2.9 MMOL/L (ref 3.6–5.1)
POTASSIUM BLD-SCNC: 3.7 MMOL/L (ref 3.6–5.1)
POTASSIUM BLD-SCNC: 3.7 MMOL/L (ref 3.6–5.1)
POTASSIUM SERPL-SCNC: 3.6 MMOL/L (ref 3.5–5.1)
PROT SERPL-MCNC: 6.5 G/DL (ref 6–8.4)
PROTEIN, POC UA: NEGATIVE
PROTEIN, POC: 6.5 G/DL (ref 6.4–8.1)
PROTEIN, POC: 6.6 G/DL (ref 6.4–8.1)
PROTEIN, POC: 7.3 G/DL (ref 6.4–8.1)
SAMPLE: ABNORMAL
SAMPLE: NORMAL
SITE: ABNORMAL
SODIUM BLD-SCNC: 146 MMOL/L (ref 128–145)
SODIUM BLD-SCNC: 150 MMOL/L (ref 128–145)
SODIUM BLD-SCNC: 151 MMOL/L (ref 128–145)
SODIUM SERPL-SCNC: 144 MMOL/L (ref 136–145)
SPECIFIC GRAVITY, POC UA: 1.01
UROBILINOGEN, POC UA: 0.2 E.U./DL

## 2023-03-30 PROCEDURE — 81003 URINALYSIS AUTO W/O SCOPE: CPT | Mod: ER

## 2023-03-30 PROCEDURE — 82803 BLOOD GASES ANY COMBINATION: CPT | Mod: ER

## 2023-03-30 PROCEDURE — 80053 COMPREHEN METABOLIC PANEL: CPT | Performed by: EMERGENCY MEDICINE

## 2023-03-30 PROCEDURE — 96372 THER/PROPH/DIAG INJ SC/IM: CPT | Performed by: EMERGENCY MEDICINE

## 2023-03-30 PROCEDURE — 85025 COMPLETE CBC W/AUTO DIFF WBC: CPT | Mod: ER

## 2023-03-30 PROCEDURE — 84484 ASSAY OF TROPONIN QUANT: CPT | Mod: ER

## 2023-03-30 PROCEDURE — 96361 HYDRATE IV INFUSION ADD-ON: CPT | Mod: ER

## 2023-03-30 PROCEDURE — 25000003 PHARM REV CODE 250: Mod: ER | Performed by: EMERGENCY MEDICINE

## 2023-03-30 PROCEDURE — 83935 ASSAY OF URINE OSMOLALITY: CPT | Performed by: EMERGENCY MEDICINE

## 2023-03-30 PROCEDURE — 99284 EMERGENCY DEPT VISIT MOD MDM: CPT | Mod: 25,ER

## 2023-03-30 PROCEDURE — 96360 HYDRATION IV INFUSION INIT: CPT | Mod: ER

## 2023-03-30 PROCEDURE — 80053 COMPREHEN METABOLIC PANEL: CPT | Mod: ER

## 2023-03-30 PROCEDURE — 63600175 PHARM REV CODE 636 W HCPCS: Mod: ER | Performed by: EMERGENCY MEDICINE

## 2023-03-30 RX ORDER — KETOROLAC TROMETHAMINE 30 MG/ML
15 INJECTION, SOLUTION INTRAMUSCULAR; INTRAVENOUS
Status: COMPLETED | OUTPATIENT
Start: 2023-03-30 | End: 2023-03-30

## 2023-03-30 RX ORDER — METHOCARBAMOL 500 MG/1
1000 TABLET, FILM COATED ORAL NIGHTLY
Qty: 14 TABLET | Refills: 0 | Status: SHIPPED | OUTPATIENT
Start: 2023-03-30 | End: 2023-04-06

## 2023-03-30 RX ADMIN — KETOROLAC TROMETHAMINE 15 MG: 30 INJECTION, SOLUTION INTRAMUSCULAR; INTRAVENOUS at 05:03

## 2023-03-30 RX ADMIN — SODIUM CHLORIDE 1000 ML: 9 INJECTION, SOLUTION INTRAVENOUS at 06:03

## 2023-03-30 NOTE — ED PROVIDER NOTES
Encounter Date: 3/30/2023       History     Chief Complaint   Patient presents with    Back Pain     Pt reports back pain and chest discomfort while moving pts last night at work. Pt reports taking Ibuprofen 800mg with minimal relief     This patient presents emergency department with complaints of spasmodic pain in her back and in her chest.  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  She denies any cough cold fever flu-like symptoms nausea vomiting diarrhea or excessive thirst increased urination frequency.    The history is provided by the patient.   Review of patient's allergies indicates:   Allergen Reactions    Neosporin [benzalkonium chloride]      Rash       Past Medical History:   Diagnosis Date    Arthritis     Diabetes mellitus     High cholesterol     Hypertension      Past Surgical History:   Procedure Laterality Date    c cestion      KNEE ARTHROPLASTY       No family history on file.  Social History     Tobacco Use    Smoking status: Every Day     Packs/day: 0.50     Types: Cigarettes    Smokeless tobacco: Never   Substance Use Topics    Alcohol use: Not Currently    Drug use: Not Currently     Review of Systems   Constitutional: Negative.    HENT: Negative.     Eyes: Negative.    Respiratory: Negative.     Cardiovascular: Negative.    Gastrointestinal: Negative.    Endocrine: Negative.    Genitourinary: Negative.    Musculoskeletal:  Positive for myalgias.   Skin: Negative.    Allergic/Immunologic: Negative.    Neurological: Negative.    Hematological: Negative.    Psychiatric/Behavioral: Negative.     All other systems reviewed and are negative.    Physical Exam     Initial Vitals [03/30/23 0520]   BP Pulse Resp Temp SpO2   124/71 91 17 98.9 °F (37.2 °C) 99 %      MAP       --         Physical Exam    Nursing note and vitals reviewed.  Constitutional:  Vital signs are normal. She appears well-developed. She is active and cooperative.   HENT:   Head: Normocephalic and atraumatic.   Eyes: Conjunctivae, EOM and lids are normal. Pupils are equal, round, and reactive to light.   Neck: Trachea normal. Neck supple. No thyroid mass present.    Full passive range of motion without pain.     Cardiovascular:  Normal rate, regular rhythm, S1 normal, S2 normal, normal heart sounds, intact distal pulses and normal pulses.           Pulmonary/Chest: Effort normal and breath sounds normal.   Abdominal: Abdomen is soft. Bowel sounds are normal. There is no abdominal tenderness.   Musculoskeletal:         General: Normal range of motion.        Arms:       Cervical back: Full passive range of motion without pain and neck supple.        Back:      Lymphadenopathy:     She has no axillary adenopathy.   Neurological: She is alert and oriented to person, place, and time.   Skin: Skin is warm, dry and intact.   Psychiatric: She has a normal mood and affect. Her speech is normal and behavior is normal. Judgment and thought content normal. Cognition and memory are normal.       ED Course   Procedures  Labs Reviewed   COMPREHENSIVE METABOLIC PANEL - Abnormal; Notable for the following components:       Result Value    Chloride 114 (*)     CO2 22 (*)     Calcium 8.2 (*)     Albumin 3.1 (*)     ALT 8 (*)     All other components within normal limits   POCT URINALYSIS W/O SCOPE - Abnormal; Notable for the following components:    Glucose, UA 2+ (*)     All other components within normal limits   POCT CMP - Abnormal; Notable for the following components:    POC Chloride 110 (*)     POC Sodium 151 (*)     All other components within normal limits   POCT CMP - Abnormal; Notable for the following components:    ALT (SGPT), POC 8 (*)     POC Chloride 114 (*)     POC Potassium 2.9 (*)     POC Sodium 146 (*)     All other components within normal limits   ISTAT PROCEDURE - Abnormal; Notable for the  following components:    POC SATURATED O2 84 (*)     All other components within normal limits   POCT CMP - Abnormal; Notable for the following components:    POC Chloride 113 (*)     POC Sodium 150 (*)     All other components within normal limits   TROPONIN ISTAT   OSMOLALITY, URINE RANDOM   POCT CBC   POCT URINALYSIS W/O SCOPE   POCT CMP   POCT TROPONIN   POCT CMP   POCT CMP   POCT CMP        Results for orders placed or performed during the hospital encounter of 03/30/23   Troponin ISTAT   Result Value Ref Range    POC Cardiac Troponin I 0.00 0.00 - 0.08 ng/mL    Sample unknown    Comprehensive Metabolic Panel   Result Value Ref Range    Sodium 144 136 - 145 mmol/L    Potassium 3.6 3.5 - 5.1 mmol/L    Chloride 114 (H) 95 - 110 mmol/L    CO2 22 (L) 23 - 29 mmol/L    Glucose 75 70 - 110 mg/dL    BUN 11 8 - 23 mg/dL    Creatinine 0.7 0.5 - 1.4 mg/dL    Calcium 8.2 (L) 8.7 - 10.5 mg/dL    Total Protein 6.5 6.0 - 8.4 g/dL    Albumin 3.1 (L) 3.5 - 5.2 g/dL    Total Bilirubin 0.2 0.1 - 1.0 mg/dL    Alkaline Phosphatase 57 55 - 135 U/L    AST 10 10 - 40 U/L    ALT 8 (L) 10 - 44 U/L    Anion Gap 8 8 - 16 mmol/L    eGFR >60 >60 mL/min/1.73 m^2   POCT URINALYSIS W/O SCOPE   Result Value Ref Range    Glucose, UA 2+ (A)     Bilirubin, UA Negative     Ketones, UA Negative     Spec Grav UA 1.015     Blood, UA Negative     PH, UA 6.0     Protein, UA Negative     Urobilinogen, UA 0.2 E.U./dL    Nitrite, UA Negative     Leukocytes, UA Negative     Color, UA Yellow     Clarity, UA Clear    POCT CMP   Result Value Ref Range    Albumin, POC 3.6 3.3 - 5.5 g/dL    Alkaline Phosphatase, POC 63 42 - 141 U/L    ALT (SGPT), POC 12 10 - 47 U/L    AST (SGOT), POC 17 11 - 38 U/L    POC BUN 12 7 - 22 mg/dL    Calcium, POC 9.6 8.0 - 10.3 mg/dL    POC Chloride 110 (H) 98 - 108 mmol/L    POC Creatinine 0.9 0.6 - 1.2 mg/dL    POC Glucose 110 73 - 118 mg/dL    POC Potassium 3.7 3.6 - 5.1 mmol/L    POC Sodium 151 (H) 128 - 145 mmol/L    Bilirubin,  POC 0.5 0.2 - 1.6 mg/dL    POC TCO2 27 18 - 33 mmol/L    Protein, POC 7.3 6.4 - 8.1 g/dL   POCT CMP   Result Value Ref Range    Albumin, POC 3.4 3.3 - 5.5 g/dL    Alkaline Phosphatase, POC 64 42 - 141 U/L    ALT (SGPT), POC 8 (L) 10 - 47 U/L    AST (SGOT), POC 15 11 - 38 U/L    POC BUN 12 7 - 22 mg/dL    Calcium, POC 8.9 8.0 - 10.3 mg/dL    POC Chloride 114 (H) 98 - 108 mmol/L    POC Creatinine 0.7 0.6 - 1.2 mg/dL    POC Glucose 97 73 - 118 mg/dL    POC Potassium 2.9 (L) 3.6 - 5.1 mmol/L    POC Sodium 146 (H) 128 - 145 mmol/L    Bilirubin, POC 0.5 0.2 - 1.6 mg/dL    POC TCO2 25 18 - 33 mmol/L    Protein, POC 6.6 6.4 - 8.1 g/dL   ISTAT PROCEDURE   Result Value Ref Range    POC PH 7.398 7.35 - 7.45    POC PCO2 41.1 35 - 45 mmHg    POC PO2 49 40 - 60 mmHg    POC HCO3 25.3 24 - 28 mmol/L    POC BE 0 -2 to 2 mmol/L    POC SATURATED O2 84 (L) 95 - 100 %    POC Lactate 0.66 0.5 - 2.2 mmol/L    POC TCO2 27 24 - 29 mmol/L    Sample VENOUS     Site Other     Allens Test N/A    POCT CMP   Result Value Ref Range    Albumin, POC 3.1 3.3 - 5.5 g/dL    Alkaline Phosphatase, POC 53 42 - 141 U/L    ALT (SGPT), POC 10 10 - 47 U/L    AST (SGOT), POC 21 11 - 38 U/L    POC BUN 11 7 - 22 mg/dL    Calcium, POC 8.9 8.0 - 10.3 mg/dL    POC Chloride 113 (H) 98 - 108 mmol/L    POC Creatinine 0.6 0.6 - 1.2 mg/dL    POC Glucose 95 73 - 118 mg/dL    POC Potassium 3.7 3.6 - 5.1 mmol/L    POC Sodium 150 (H) 128 - 145 mmol/L    Bilirubin, POC 0.6 0.2 - 1.6 mg/dL    POC TCO2 25 18 - 33 mmol/L    Protein, POC 6.5 6.4 - 8.1 g/dL         Imaging Results              X-Ray Chest AP Portable (Final result)  Result time 03/30/23 06:19:28      Final result by Amol Stokes MD (03/30/23 06:19:28)                   Impression:      As above.      Electronically signed by: Amol Stokes MD  Date:    03/30/2023  Time:    06:19               Narrative:    EXAMINATION:  XR CHEST AP PORTABLE    CLINICAL HISTORY:  Chest pain, unspecified    TECHNIQUE:  Single  frontal view of the chest was performed.    FINDINGS:  The lungs are clear.  There is no pneumothorax or pleural fluid.  The cardiac silhouette is not enlarged.  The osseous structures demonstrate degenerative change.                                       Medications   ketorolac injection 15 mg (15 mg Intramuscular Given 3/30/23 0537)   sodium chloride 0.9% bolus 1,000 mL 1,000 mL (0 mLs Intravenous Stopped 3/30/23 0802)     Medical Decision Making:   Clinical Tests:   Lab Tests: Ordered and Reviewed  ED Management:  Patient care turned over to Dr. Adorno at 7:00 a.m..    I assumed care of pt from Dr. Ahuja at 0700. She presented with spasmodic pain in her back and chest. Labs were drawn and NA was 151. He gave her 1L NS and wanted labs rechecked. On repeat, Na 146 and K 2.9 so repeat CMP done. Now Na 150 and K 3.7. I am not comfortable with this variation. Will send CMP and urine osmolality to [facility]. Pt reports drinking less water and more soda at work but normal solid food intake and she urinates 4-5 times a day. She denies n/v/d and denies any other risk factors for dehydration. She is not on diuretics.    Labs sent to [facility] and came back normal Na 144 and K 3.6. I believe previous labs were lab error. Pt discharged with robaxin for muscle spasms.  Other:   I have discussed this case with another health care provider.                        Clinical Impression:   Final diagnoses:  [R07.9] Chest pain  [M62.838] Muscle spasm (Primary)        ED Disposition Condition    Discharge Stable          ED Prescriptions       Medication Sig Dispense Start Date End Date Auth. Provider    methocarbamoL (ROBAXIN) 500 MG Tab Take 2 tablets (1,000 mg total) by mouth every evening. for 7 days 14 tablet 3/30/2023 4/6/2023 Ct Adorno MD          Follow-up Information       Follow up With Specialties Details Why Contact Info    Daron Slaughter MD General Practice  As needed [address] [facility]  [place] [address] [phone]                Ct Adorno MD  03/30/23 0631

## 2023-03-30 NOTE — DISCHARGE INSTRUCTIONS
Take medications as directed. You may also take motrin every 6 hours. Try laying on a heating pad for 20 minutes before bed. See your doctor if you are not better with this treatment.

## 2023-03-30 NOTE — Clinical Note
"Kristine Valentini" Diana was seen and treated in our emergency department on 3/30/2023.  She may return to work on 04/02/2023.       If you have any questions or concerns, please don't hesitate to call.      Sirena Madrid RN    "

## 2023-03-30 NOTE — Clinical Note
"Kristine"Patricia Ortiz was seen and treated in our emergency department on 3/30/2023.  She may return to work on 03/31/2023.       If you have any questions or concerns, please don't hesitate to call.      Ct Adorno MD"

## 2023-03-30 NOTE — ED NOTES
Received report from DERICK Dhillon. RN introduced self at patient bedside. Pt alert and calm, oriented x4. No acute distress noted. NS fluids infusing. Vital signs reassessed and stable. Pt verbalizes no needs at this time. Will continue to monitor.

## 2023-08-04 DIAGNOSIS — M25.562 PAIN IN BOTH KNEES, UNSPECIFIED CHRONICITY: Primary | ICD-10-CM

## 2023-08-04 DIAGNOSIS — M25.561 PAIN IN BOTH KNEES, UNSPECIFIED CHRONICITY: Primary | ICD-10-CM

## 2023-09-16 ENCOUNTER — HOSPITAL ENCOUNTER (EMERGENCY)
Facility: HOSPITAL | Age: 63
Discharge: HOME | End: 2023-09-16
Attending: EMERGENCY MEDICINE
Payer: MEDICAID

## 2023-09-16 VITALS
OXYGEN SATURATION: 98 % | HEART RATE: 84 BPM | SYSTOLIC BLOOD PRESSURE: 150 MMHG | RESPIRATION RATE: 20 BRPM | TEMPERATURE: 98 F | WEIGHT: 130 LBS | DIASTOLIC BLOOD PRESSURE: 74 MMHG | HEIGHT: 61 IN | BODY MASS INDEX: 24.55 KG/M2

## 2023-09-16 DIAGNOSIS — R51.9 ACUTE NONINTRACTABLE HEADACHE, UNSPECIFIED HEADACHE TYPE: ICD-10-CM

## 2023-09-16 DIAGNOSIS — R07.9 CHEST PAIN: ICD-10-CM

## 2023-09-16 DIAGNOSIS — R42 VERTIGO: Primary | ICD-10-CM

## 2023-09-16 LAB
ALBUMIN SERPL-MCNC: 3.7 G/DL (ref 3.3–5.5)
ALP SERPL-CCNC: 76 U/L (ref 42–141)
BILIRUB SERPL-MCNC: 0.5 MG/DL (ref 0.2–1.6)
BILIRUBIN, POC UA: NEGATIVE
BLOOD, POC UA: NEGATIVE
BUN SERPL-MCNC: 12 MG/DL (ref 7–22)
CALCIUM SERPL-MCNC: 9.9 MG/DL (ref 8–10.3)
CHLORIDE SERPL-SCNC: 105 MMOL/L (ref 98–108)
CLARITY, POC UA: CLEAR
COLOR, POC UA: YELLOW
CREAT SERPL-MCNC: 0.5 MG/DL (ref 0.6–1.2)
GLUCOSE SERPL-MCNC: 107 MG/DL (ref 73–118)
GLUCOSE, POC UA: ABNORMAL
KETONES, POC UA: NEGATIVE
LEUKOCYTE EST, POC UA: NEGATIVE
NITRITE, POC UA: NEGATIVE
PH UR STRIP: 7 [PH]
POC ALT (SGPT): 13 U/L (ref 10–47)
POC AST (SGOT): 20 U/L (ref 11–38)
POC B-TYPE NATRIURETIC PEPTIDE: <5 PG/ML (ref 0–100)
POC CARDIAC TROPONIN I: 0 NG/ML (ref 0–0.08)
POC PTINR: 1 (ref 0.9–1.2)
POC PTWBT: 12 SEC (ref 9.7–14.3)
POC TCO2: 30 MMOL/L (ref 18–33)
POCT GLUCOSE: 103 MG/DL (ref 70–110)
POTASSIUM BLD-SCNC: 4.6 MMOL/L (ref 3.6–5.1)
PROTEIN, POC UA: NEGATIVE
PROTEIN, POC: 7.9 G/DL (ref 6.4–8.1)
SAMPLE: NORMAL
SAMPLE: NORMAL
SODIUM BLD-SCNC: 147 MMOL/L (ref 128–145)
SPECIFIC GRAVITY, POC UA: 1.01
UROBILINOGEN, POC UA: 0.2 E.U./DL

## 2023-09-16 PROCEDURE — 96374 THER/PROPH/DIAG INJ IV PUSH: CPT | Mod: ER

## 2023-09-16 PROCEDURE — 82962 GLUCOSE BLOOD TEST: CPT | Mod: ER

## 2023-09-16 PROCEDURE — 63600175 PHARM REV CODE 636 W HCPCS: Mod: ER | Performed by: EMERGENCY MEDICINE

## 2023-09-16 PROCEDURE — 25000003 PHARM REV CODE 250: Mod: ER | Performed by: EMERGENCY MEDICINE

## 2023-09-16 PROCEDURE — 99285 EMERGENCY DEPT VISIT HI MDM: CPT | Mod: 25,ER

## 2023-09-16 RX ORDER — ACETAMINOPHEN 500 MG
1000 TABLET ORAL EVERY 6 HOURS PRN
Qty: 30 TABLET | Refills: 0 | OUTPATIENT
Start: 2023-09-16 | End: 2023-10-09

## 2023-09-16 RX ORDER — DIPHENHYDRAMINE HCL 25 MG
25 CAPSULE ORAL EVERY 6 HOURS PRN
Qty: 20 CAPSULE | Refills: 0 | Status: SHIPPED | OUTPATIENT
Start: 2023-09-16 | End: 2023-10-09 | Stop reason: ALTCHOICE

## 2023-09-16 RX ORDER — MECLIZINE HYDROCHLORIDE 25 MG/1
25 TABLET ORAL 3 TIMES DAILY PRN
Qty: 20 TABLET | Refills: 0 | OUTPATIENT
Start: 2023-09-16 | End: 2023-10-09

## 2023-09-16 RX ORDER — KETOROLAC TROMETHAMINE 30 MG/ML
15 INJECTION, SOLUTION INTRAMUSCULAR; INTRAVENOUS
Status: COMPLETED | OUTPATIENT
Start: 2023-09-16 | End: 2023-09-16

## 2023-09-16 RX ORDER — ASPIRIN 325 MG
325 TABLET ORAL
Status: COMPLETED | OUTPATIENT
Start: 2023-09-16 | End: 2023-09-16

## 2023-09-16 RX ORDER — MECLIZINE HYDROCHLORIDE 25 MG/1
25 TABLET ORAL
Status: COMPLETED | OUTPATIENT
Start: 2023-09-16 | End: 2023-09-16

## 2023-09-16 RX ORDER — IBUPROFEN 600 MG/1
600 TABLET ORAL EVERY 6 HOURS PRN
Qty: 20 TABLET | Refills: 0 | OUTPATIENT
Start: 2023-09-16 | End: 2023-10-09

## 2023-09-16 RX ADMIN — KETOROLAC TROMETHAMINE 15 MG: 30 INJECTION, SOLUTION INTRAMUSCULAR; INTRAVENOUS at 10:09

## 2023-09-16 RX ADMIN — ASPIRIN 325 MG ORAL TABLET 325 MG: 325 PILL ORAL at 08:09

## 2023-09-16 RX ADMIN — MECLIZINE HYDROCHLORIDE 25 MG: 25 TABLET ORAL at 08:09

## 2023-09-16 NOTE — ED PROVIDER NOTES
"Encounter Date: 9/16/2023    SCRIBE #1 NOTE: I, Federico Singh, am scribing for, and in the presence of,  Emily Quinn DO.       History     Chief Complaint   Patient presents with    Headache     Pt states headaches and dizziness x2 weeks     Kristine Ortiz is a 62 y.o. female with Hx of HTN, HLD, and DM, who presents to the ED for chief complaint of acute on chronic constant headache for 2 weeks. Patient reports complaints of headaches "for months" but endorses this episode began 2 weeks ago. She endorses she has been previously evaluated for her headaches by her PCP and reports she was told it was due to stress. Additionally, the patient reports accidentally hitting her head with her freezer's door. She describes her pain as a "throbbing" 8/10 to her posterior superior and left sided head. Her pain is non-radiating. Additionally, she reports dizziness for three days, noting it is mostly present whenever she "moves her head down and then up." Denies previous similar dizziness episodes.  Patient reports it is not the worst headache of her life.  She also reports intermittent non-radiating mid-sternal chest pain for 3 days described as a 5/10. She endorses similar past chest pain episodes, noting she has been evaluated and states she was told "it was gas," but denies any recent episodes "in a while." She reports attempting treatment with tylenol (last dose last night at 10 PM) with no immediate relief noted. No medications taken PTA. No other alleviating or exacerbating factors noted. Denies speech difficulty, numbness, weakness, N/V/D, SOB, neck pain, or other associated symptoms. She is a smoker (5 cigarettes per day). Denies EtOH, or other illicit drug usage. Allergic to neosporin.    The history is provided by the patient. No  was used.     Review of patient's allergies indicates:   Allergen Reactions    Neosporin [benzalkonium chloride]      Rash       Past Medical History:   Diagnosis Date    " Arthritis     Diabetes mellitus     High cholesterol     Hypertension      Past Surgical History:   Procedure Laterality Date    c cestion      KNEE ARTHROPLASTY       No family history on file.  Social History     Tobacco Use    Smoking status: Every Day     Current packs/day: 0.50     Types: Cigarettes    Smokeless tobacco: Never   Substance Use Topics    Alcohol use: Not Currently    Drug use: Not Currently     Review of Systems   Constitutional:  Negative for fever.   HENT:  Negative for rhinorrhea and sore throat.    Eyes:  Negative for redness.   Respiratory:  Negative for shortness of breath.    Cardiovascular:  Positive for chest pain (mid-sternal). Negative for leg swelling.   Gastrointestinal:  Negative for abdominal pain, diarrhea, nausea and vomiting.   Musculoskeletal:  Negative for back pain and neck pain.   Skin:  Negative for rash.   Neurological:  Positive for dizziness and headaches. Negative for syncope, speech difficulty, weakness and numbness.   All other systems reviewed and are negative.      Physical Exam     Initial Vitals [09/16/23 0748]   BP Pulse Resp Temp SpO2   (!) 162/70 85 18 98 °F (36.7 °C) 98 %      MAP       --         Physical Exam    Nursing note and vitals reviewed.  Constitutional: She appears well-developed and well-nourished.   HENT:   Head: Normocephalic and atraumatic.   Right Ear: External ear normal.   Left Ear: External ear normal.   Nose: Nose normal.   Mouth/Throat: Oropharynx is clear and moist.   Eyes: Conjunctivae and EOM are normal. Pupils are equal, round, and reactive to light.   Neck: Phonation normal. Neck supple.   Normal range of motion.  Cardiovascular:  Normal rate, regular rhythm, normal heart sounds and intact distal pulses.     Exam reveals no gallop and no friction rub.       No murmur heard.  Pulmonary/Chest: Effort normal and breath sounds normal. No stridor. No respiratory distress. She has no wheezes. She has no rhonchi. She has no rales. She  exhibits no tenderness.   Abdominal: Abdomen is soft. Bowel sounds are normal. She exhibits no distension. There is no abdominal tenderness. There is no rigidity, no rebound and no guarding.   Musculoskeletal:         General: No tenderness or edema. Normal range of motion.      Cervical back: Normal range of motion and neck supple.     Neurological: She is alert and oriented to person, place, and time. She has normal strength. No cranial nerve deficit or sensory deficit. GCS score is 15. GCS eye subscore is 4. GCS verbal subscore is 5. GCS motor subscore is 6.   Cranial nerves II-XII intact. Sensation grossly intact. Bilateral  strength equal. Strength 5/5 to all extremities. Deep tendon reflexes normal.     Skin: Skin is warm and dry. Capillary refill takes less than 2 seconds. No rash noted.   Psychiatric: She has a normal mood and affect. Her behavior is normal.         ED Course   Procedures  Labs Reviewed   POCT URINALYSIS W/O SCOPE - Abnormal; Notable for the following components:       Result Value    Glucose, UA 2+ (*)     All other components within normal limits   POCT CMP - Abnormal; Notable for the following components:    POC Creatinine 0.5 (*)     POC Sodium 147 (*)     All other components within normal limits   TROPONIN ISTAT   POCT URINALYSIS W/O SCOPE   POCT CBC   POCT GLUCOSE   POCT CMP   POCT PROTIME-INR   POCT TROPONIN   POCT B-TYPE NATRIURETIC PEPTIDE (BNP)   ISTAT PROCEDURE   POCT B-TYPE NATRIURETIC PEPTIDE (BNP)          Imaging Results              CT Cervical Spine Without Contrast (Final result)  Result time 09/16/23 10:02:53      Final result by Bart Ring MD (09/16/23 10:02:53)                   Impression:      Pre-existing multilevel degenerative changes and C4-C5 anterolisthesis.    No acute fracture deformity or acute vertebral malalignment in the cervical spine.      Electronically signed by: Bart Ring  Date:    09/16/2023  Time:    10:02               Narrative:     EXAMINATION:  CT CERVICAL SPINE WITHOUT CONTRAST    CLINICAL HISTORY:  Neck trauma, midline tenderness (Age 16-64y);    TECHNIQUE:  Low dose axial images, sagittal and coronal reformations were performed though the cervical spine.  Contrast was not administered.    COMPARISON:  C-spine CT 02/11/2022    FINDINGS:  Diffuse degenerative changes, including small posterior disc osteophyte complexes and bilateral uncovertebral hypertrophy at C3-4, C5-6, and C6-7.  Mild associated multilevel associated mild foraminal stenosis and spinal canal stenosis (the latter most apparent at C6-7).    A curvilinear ossification projecting adjacent to the tip of the C7 spinous process was present previously, is not changed in position, and is most compatible with chronic degenerative change.    Grade 1 C4-C5 anterolisthesis, most likely on the basis of pre-existing facet disease.    Otherwise, no acute traumatic vertebral malalignment is demonstrated in the cervical spine.    No acute cervical fracture deformity or pre-vertebral cervical soft tissue swelling.    There remain discontinuities in the anterior and posterior arches of C1, already present on the comparison scan, and most likely a developmental variant.    Straightened cervical lordosis, similar to the comparison scan.    Visualized portions of the pharynx and upper trachea demonstrate no luminal occlusion.    Visualized apical portions of the chest demonstrate no evidence of acute visceral injury.                                       X-Ray Chest PA And Lateral (Final result)  Result time 09/16/23 09:41:50      Final result by José Garcia MD (09/16/23 09:41:50)                   Impression:      No acute cardiopulmonary abnormality.      Electronically signed by: José Garcia  Date:    09/16/2023  Time:    09:41               Narrative:    EXAMINATION:  XR CHEST PA AND LATERAL    CLINICAL HISTORY:  Chest Pain;    TECHNIQUE:  PA and lateral views of the chest  were performed.    COMPARISON:  Chest radiograph dated 03/30/2023    FINDINGS:  Overlying monitoring leads.  Cardiomediastinal silhouette unchanged.  No confluent airspace opacity or lobar consolidation.  No pleural effusion or gross pneumothorax.  No acute osseous abnormality.                                       CT Head Without Contrast (Final result)  Result time 09/16/23 09:39:32      Final result by José Garcia MD (09/16/23 09:39:32)                   Impression:      No acute intracranial abnormality.      Electronically signed by: José Garcia  Date:    09/16/2023  Time:    09:39               Narrative:    EXAMINATION:  CT HEAD WITHOUT CONTRAST    CLINICAL HISTORY:  Dizziness, persistent/recurrent, cardiac or vascular cause suspected;    TECHNIQUE:  Low dose axial CT images obtained throughout the head without intravenous contrast. Sagittal and coronal reconstructions were performed.    COMPARISON:  CT head without contrast dated 02/11/2022    FINDINGS:  No evidence for major vascular distribution infarct, intracranial hemorrhage or extra-axial collection.  No midline shift or mass effect.  No hydrocephalus.  Visualized paranasal sinuses and bilateral mastoid air cells are clear.  No acute calvarial abnormality.  Incidental incomplete fusion of the anterior arch of C1.                                       Medications   aspirin tablet 325 mg (325 mg Oral Given 9/16/23 0843)   meclizine tablet 25 mg (25 mg Oral Given 9/16/23 0843)   ketorolac injection 15 mg (15 mg Intravenous Given 9/16/23 1033)     Medical Decision Making  Amount and/or Complexity of Data Reviewed  Labs: ordered.  Radiology: ordered.  ECG/medicine tests: ordered and independent interpretation performed. Decision-making details documented in ED Course.     Details: External documents reviewed for previous EKG comparison: see ED course.       Risk  OTC drugs.  Prescription drug management.      Medical Decision Making:    This  is an evaluation of a 62 y.o. female that presents to the Emergency Department for   Chief Complaint   Patient presents with    Headache     Pt states headaches and dizziness x2 weeks         The patient is a non-toxic and well appearing patient. On physical exam, patient appears well hydrated with moist mucus membranes. Breath sounds are clear and equal bilaterally with no adventitious breath sounds, tachypnea or respiratory distress. Regular rate and rhythm. No murmurs. Abdomen soft and non tender. Patient is tolerating PO without difficulty.  Vital Signs Are Reassuring.     Based on the patient's symptoms, I am considering and evaluating for the following differential diagnoses: hyperglycemia, UTI, skull fracture, CVA, vertigo, electrolyte imbalance.     Consider hospitalization for:  Dizziness    Patient is agreeable to transfer and admission to [facility].    ED Course:Treatment in the ED included Physical Exam and medications given in ED  Medications   aspirin tablet 325 mg (325 mg Oral Given 9/16/23 0843)   meclizine tablet 25 mg (25 mg Oral Given 9/16/23 0843)   ketorolac injection 15 mg (15 mg Intravenous Given 9/16/23 1033)   .   Patient reports feeling better after treatment in the ER.     External Data/Documents Reviewed:   Labs: ordered and reviewed. Decision-making details documented in ED Course.  Radiology: ordered as indicated and reviewed. Decision-making details documented in ED Course.   ECG/medicine tests: ordered and independent interpretation performed by Dr. Emily Quinn DO. Decision-making details documented in ED Course.   Cardiac monitor placed for dizziness. Monitor shows Normal Sinus Rhythm. Interpreted by Dr. Emily Quinn DO.      Patient presents with chest pain for greater than 24 hours.  Troponin is negative.  No STEMI on EKG and no acute issues on chest x-ray. Chest pain unlikely to be cardiac in origin.  I recommend follow up with Cardiology in 1 day for further evaluation of chest pain.  Discussed need to return to the ED if chest pain worsens or does not resolve.    Risk  Diagnosis or treatment significantly limited by the following social determinants of health: Body mass index is 24.56 kg/m².     In shared decision making with the patient, we discussed treatment, prescriptions, labs, and imaging results.    Discharge home with   ED Prescriptions       Medication Sig Dispense Start Date End Date Auth. Provider    meclizine (ANTIVERT) 25 mg tablet Take 1 tablet (25 mg total) by mouth 3 (three) times daily as needed for Nausea or Dizziness (As needed for dizziness). 20 tablet 9/16/2023 -- Emily Quinn DO    ibuprofen (ADVIL,MOTRIN) 600 MG tablet Take 1 tablet (600 mg total) by mouth every 6 (six) hours as needed for Pain (Take with food as needed for mild-to-moderate pain). 20 tablet 9/16/2023 -- Emily Quinn DO    acetaminophen (TYLENOL) 500 MG tablet Take 2 tablets (1,000 mg total) by mouth every 6 (six) hours as needed for Pain. 30 tablet 9/16/2023 -- Emily Quinn DO    diphenhydrAMINE (BENADRYL) 25 mg capsule Take 1 capsule (25 mg total) by mouth every 6 (six) hours as needed for Itching or Allergies (Take if headache does not resolve). 20 capsule 9/16/2023 -- Emily Quinn DO          Fill and take prescriptions as directed.  Return to the ED if symptoms worsen or do not resolve.   Answered questions and discussed discharge plan.    Patient feels better and is ready for discharge.  Follow up with PCP/specialist in 1 day        The following labs and imaging were reviewed:    Insert CBC here       Admission on 09/16/2023, Discharged on 09/16/2023   Component Date Value Ref Range Status    POCT Glucose 09/16/2023 103  70 - 110 mg/dL Final    Glucose, UA 09/16/2023 2+ (A)   Final    Bilirubin, UA 09/16/2023 Negative   Final    Ketones, UA 09/16/2023 Negative   Final    Spec Grav UA 09/16/2023 1.015   Final    Blood, UA 09/16/2023 Negative   Final    PH, UA 09/16/2023 7.0   Final    Protein, UA  09/16/2023 Negative   Final    Urobilinogen, UA 09/16/2023 0.2  E.U./dL Final    Nitrite, UA 09/16/2023 Negative   Final    Leukocytes, UA 09/16/2023 Negative   Final    Color, UA 09/16/2023 Yellow   Final    Clarity, UA 09/16/2023 Clear   Final    Albumin, POC 09/16/2023 3.7  3.3 - 5.5 g/dL Final    Alkaline Phosphatase, POC 09/16/2023 76  42 - 141 U/L Final    ALT (SGPT), POC 09/16/2023 13  10 - 47 U/L Final    AST (SGOT), POC 09/16/2023 20  11 - 38 U/L Final    POC BUN 09/16/2023 12  7 - 22 mg/dL Final    Calcium, POC 09/16/2023 9.9  8.0 - 10.3 mg/dL Final    POC Chloride 09/16/2023 105  98 - 108 mmol/L Final    POC Creatinine 09/16/2023 0.5 (L)  0.6 - 1.2 mg/dL Final    POC Glucose 09/16/2023 107  73 - 118 mg/dL Final    POC Potassium 09/16/2023 4.6  3.6 - 5.1 mmol/L Final    POC Sodium 09/16/2023 147 (H)  128 - 145 mmol/L Final    Bilirubin, POC 09/16/2023 0.5  0.2 - 1.6 mg/dL Final    POC TCO2 09/16/2023 30  18 - 33 mmol/L Final    Protein, POC 09/16/2023 7.9  6.4 - 8.1 g/dL Final    POC PTWBT 09/16/2023 12.0  9.7 - 14.3 sec Final    POC PTINR 09/16/2023 1.0  0.9 - 1.2 Final    Sample 09/16/2023 unknown   Final    POC Cardiac Troponin I 09/16/2023 0.00  0.00 - 0.08 ng/mL Final    Sample 09/16/2023 unknown   Final    Comment: A single negative troponin is insufficient to rule out myocardial infarction.  The use of a serial sampling protocol is recommended practice. Correlate results with reference intervals established for methodology used. Point of care and core laboratory   troponin results are not interchangeable.      POC B-Type Natriuretic Peptide 09/16/2023 <5.0  0.0 - 100.0 pg/mL Final        Imaging Results              CT Cervical Spine Without Contrast (Final result)  Result time 09/16/23 10:02:53      Final result by Bart Ring MD (09/16/23 10:02:53)                   Impression:      Pre-existing multilevel degenerative changes and C4-C5 anterolisthesis.    No acute fracture deformity or acute  vertebral malalignment in the cervical spine.      Electronically signed by: Bart Ring  Date:    09/16/2023  Time:    10:02               Narrative:    EXAMINATION:  CT CERVICAL SPINE WITHOUT CONTRAST    CLINICAL HISTORY:  Neck trauma, midline tenderness (Age 16-64y);    TECHNIQUE:  Low dose axial images, sagittal and coronal reformations were performed though the cervical spine.  Contrast was not administered.    COMPARISON:  C-spine CT 02/11/2022    FINDINGS:  Diffuse degenerative changes, including small posterior disc osteophyte complexes and bilateral uncovertebral hypertrophy at C3-4, C5-6, and C6-7.  Mild associated multilevel associated mild foraminal stenosis and spinal canal stenosis (the latter most apparent at C6-7).    A curvilinear ossification projecting adjacent to the tip of the C7 spinous process was present previously, is not changed in position, and is most compatible with chronic degenerative change.    Grade 1 C4-C5 anterolisthesis, most likely on the basis of pre-existing facet disease.    Otherwise, no acute traumatic vertebral malalignment is demonstrated in the cervical spine.    No acute cervical fracture deformity or pre-vertebral cervical soft tissue swelling.    There remain discontinuities in the anterior and posterior arches of C1, already present on the comparison scan, and most likely a developmental variant.    Straightened cervical lordosis, similar to the comparison scan.    Visualized portions of the pharynx and upper trachea demonstrate no luminal occlusion.    Visualized apical portions of the chest demonstrate no evidence of acute visceral injury.                                       X-Ray Chest PA And Lateral (Final result)  Result time 09/16/23 09:41:50      Final result by José Garcia MD (09/16/23 09:41:50)                   Impression:      No acute cardiopulmonary abnormality.      Electronically signed by: José  Jose  Date:    09/16/2023  Time:    09:41               Narrative:    EXAMINATION:  XR CHEST PA AND LATERAL    CLINICAL HISTORY:  Chest Pain;    TECHNIQUE:  PA and lateral views of the chest were performed.    COMPARISON:  Chest radiograph dated 03/30/2023    FINDINGS:  Overlying monitoring leads.  Cardiomediastinal silhouette unchanged.  No confluent airspace opacity or lobar consolidation.  No pleural effusion or gross pneumothorax.  No acute osseous abnormality.                                       CT Head Without Contrast (Final result)  Result time 09/16/23 09:39:32      Final result by José Garcia MD (09/16/23 09:39:32)                   Impression:      No acute intracranial abnormality.      Electronically signed by: José Garcia  Date:    09/16/2023  Time:    09:39               Narrative:    EXAMINATION:  CT HEAD WITHOUT CONTRAST    CLINICAL HISTORY:  Dizziness, persistent/recurrent, cardiac or vascular cause suspected;    TECHNIQUE:  Low dose axial CT images obtained throughout the head without intravenous contrast. Sagittal and coronal reconstructions were performed.    COMPARISON:  CT head without contrast dated 02/11/2022    FINDINGS:  No evidence for major vascular distribution infarct, intracranial hemorrhage or extra-axial collection.  No midline shift or mass effect.  No hydrocephalus.  Visualized paranasal sinuses and bilateral mastoid air cells are clear.  No acute calvarial abnormality.  Incidental incomplete fusion of the anterior arch of C1.                                            Scribe Attestation:   Scribe #1: I performed the above scribed service and the documentation accurately describes the services I performed. I attest to the accuracy of the note.        ED Course as of 09/17/23 1055   Sat Sep 16, 2023   0926 No STEMI. Rate of 82. Normal Sinus Rhythm. Left Axis. Abnormal EKG. QTc normal at 462. When compared to prior EKG dated 03/30/23 rate decreased by 2 bpm.    [BA]   1136 On reevaluation, patient reports dizziness has currently resolved. Patient also reports improvement of headache to a current 4/10. [BA]      ED Course User Index  [BA] Federico Singh, Dr. Emily Quinn, personally performed the services described in this documentation. This document was produced by a scribe under my direction and in my presence. All medical record entries made by the scribe were at my direction and in my presence.  I have reviewed the chart and agree that the record reflects my personal performance and is accurate and complete. Emily Quinn, DO.     09/17/2023 11:49 AM      Clinical Impression:   Final diagnoses:  [R07.9] Chest pain  [R42] Vertigo (Primary)  [R51.9] Acute nonintractable headache, unspecified headache type        ED Disposition Condition    Discharge Stable          ED Prescriptions       Medication Sig Dispense Start Date End Date Auth. Provider    meclizine (ANTIVERT) 25 mg tablet Take 1 tablet (25 mg total) by mouth 3 (three) times daily as needed for Nausea or Dizziness (As needed for dizziness). 20 tablet 9/16/2023 -- Emily Quinn DO    ibuprofen (ADVIL,MOTRIN) 600 MG tablet Take 1 tablet (600 mg total) by mouth every 6 (six) hours as needed for Pain (Take with food as needed for mild-to-moderate pain). 20 tablet 9/16/2023 -- Emily Quinn DO    acetaminophen (TYLENOL) 500 MG tablet Take 2 tablets (1,000 mg total) by mouth every 6 (six) hours as needed for Pain. 30 tablet 9/16/2023 -- Emily Quinn DO    diphenhydrAMINE (BENADRYL) 25 mg capsule Take 1 capsule (25 mg total) by mouth every 6 (six) hours as needed for Itching or Allergies (Take if headache does not resolve). 20 capsule 9/16/2023 -- Emily Quinn DO          Follow-up Information       Follow up With Specialties Details Why Contact Info    Daron Slaughter MD General Practice Schedule an appointment as soon as possible for a visit in 1 day  [address]  [place] [address]  [phone]      [facility] [place] Emergency Medicine Go to  Please go to [facility] emergency department if symptoms worsen [address] [place] [id number] [phone]             Emily Quinn DO  09/17/23 1050

## 2023-09-16 NOTE — Clinical Note
"Kristine Valentini" Diana was seen and treated in our emergency department on 9/16/2023.  She may return to work on 09/17/2023.       If you have any questions or concerns, please don't hesitate to call.      Mae Vazquez, RN"

## 2023-10-09 ENCOUNTER — HOSPITAL ENCOUNTER (EMERGENCY)
Facility: HOSPITAL | Age: 63
Discharge: HOME | End: 2023-10-09
Attending: EMERGENCY MEDICINE
Payer: MEDICAID

## 2023-10-09 VITALS
RESPIRATION RATE: 20 BRPM | WEIGHT: 120 LBS | SYSTOLIC BLOOD PRESSURE: 165 MMHG | OXYGEN SATURATION: 100 % | HEIGHT: 61 IN | BODY MASS INDEX: 22.66 KG/M2 | HEART RATE: 83 BPM | TEMPERATURE: 98 F | DIASTOLIC BLOOD PRESSURE: 94 MMHG

## 2023-10-09 DIAGNOSIS — G43.909 MIGRAINE WITHOUT STATUS MIGRAINOSUS, NOT INTRACTABLE, UNSPECIFIED MIGRAINE TYPE: Primary | ICD-10-CM

## 2023-10-09 DIAGNOSIS — F07.81 POST CONCUSSIVE SYNDROME: ICD-10-CM

## 2023-10-09 PROCEDURE — 99284 EMERGENCY DEPT VISIT MOD MDM: CPT | Mod: ER

## 2023-10-09 RX ORDER — BUTALBITAL, ACETAMINOPHEN AND CAFFEINE 50; 325; 40 MG/1; MG/1; MG/1
1 TABLET ORAL EVERY 4 HOURS PRN
Qty: 30 TABLET | Refills: 0 | Status: SHIPPED | OUTPATIENT
Start: 2023-10-09 | End: 2023-11-08

## 2023-10-09 RX ORDER — MECLIZINE HYDROCHLORIDE 50 MG/1
50 TABLET ORAL 3 TIMES DAILY PRN
Qty: 20 TABLET | Refills: 0 | Status: SHIPPED | OUTPATIENT
Start: 2023-10-09

## 2023-10-09 NOTE — Clinical Note
"Kristine"Patricia Ortiz was seen and treated in our emergency department on 10/9/2023.  She may return to work on 10/11/2023.       If you have any questions or concerns, please don't hesitate to call.      nAa Max MD"

## 2023-10-09 NOTE — Clinical Note
"Kristine"Patricia Ortiz was seen and treated in our emergency department on 10/9/2023.  She may return to work on 10/11/2023.       If you have any questions or concerns, please don't hesitate to call.      Ana Max MD"

## 2023-10-09 NOTE — DISCHARGE INSTRUCTIONS

## 2024-06-24 ENCOUNTER — OFFICE VISIT (OUTPATIENT)
Dept: ORTHOPEDICS | Facility: CLINIC | Age: 64
End: 2024-06-24
Payer: MEDICAID

## 2024-06-24 ENCOUNTER — HOSPITAL ENCOUNTER (OUTPATIENT)
Dept: RADIOLOGY | Facility: HOSPITAL | Age: 64
Discharge: HOME | End: 2024-06-24
Attending: PHYSICIAN ASSISTANT
Payer: MEDICAID

## 2024-06-24 VITALS — BODY MASS INDEX: 25.68 KG/M2 | HEIGHT: 61 IN | WEIGHT: 136 LBS

## 2024-06-24 DIAGNOSIS — M17.11 PRIMARY OSTEOARTHRITIS OF RIGHT KNEE: Primary | ICD-10-CM

## 2024-06-24 DIAGNOSIS — M25.561 PAIN IN BOTH KNEES, UNSPECIFIED CHRONICITY: ICD-10-CM

## 2024-06-24 DIAGNOSIS — M17.12 PRIMARY OSTEOARTHRITIS OF LEFT KNEE: ICD-10-CM

## 2024-06-24 DIAGNOSIS — M25.562 PAIN IN BOTH KNEES, UNSPECIFIED CHRONICITY: ICD-10-CM

## 2024-06-24 PROCEDURE — 1160F RVW MEDS BY RX/DR IN RCRD: CPT | Mod: CPTII,,, | Performed by: PHYSICIAN ASSISTANT

## 2024-06-24 PROCEDURE — 3008F BODY MASS INDEX DOCD: CPT | Mod: CPTII,,, | Performed by: PHYSICIAN ASSISTANT

## 2024-06-24 PROCEDURE — 99999 PR PBB SHADOW E&M-EST. PATIENT-LVL III: CPT | Mod: PBBFAC,,, | Performed by: PHYSICIAN ASSISTANT

## 2024-06-24 PROCEDURE — 99213 OFFICE O/P EST LOW 20 MIN: CPT | Mod: 25,S$PBB,, | Performed by: PHYSICIAN ASSISTANT

## 2024-06-24 PROCEDURE — 20610 DRAIN/INJ JOINT/BURSA W/O US: CPT | Mod: 50,S$PBB,, | Performed by: PHYSICIAN ASSISTANT

## 2024-06-24 PROCEDURE — 73564 X-RAY EXAM KNEE 4 OR MORE: CPT | Mod: TC,50,PN

## 2024-06-24 PROCEDURE — 20610 DRAIN/INJ JOINT/BURSA W/O US: CPT | Mod: 50,PBBFAC,PN | Performed by: PHYSICIAN ASSISTANT

## 2024-06-24 PROCEDURE — 1159F MED LIST DOCD IN RCRD: CPT | Mod: CPTII,,, | Performed by: PHYSICIAN ASSISTANT

## 2024-06-24 PROCEDURE — 99999PBSHW PR PBB SHADOW TECHNICAL ONLY FILED TO HB: Mod: PBBFAC,,,

## 2024-06-24 PROCEDURE — 99213 OFFICE O/P EST LOW 20 MIN: CPT | Mod: PBBFAC,25,PN | Performed by: PHYSICIAN ASSISTANT

## 2024-06-24 PROCEDURE — 73564 X-RAY EXAM KNEE 4 OR MORE: CPT | Mod: 26,50,, | Performed by: RADIOLOGY

## 2024-06-24 RX ORDER — TRIAMCINOLONE ACETONIDE 40 MG/ML
40 INJECTION, SUSPENSION INTRA-ARTICULAR; INTRAMUSCULAR
Status: DISCONTINUED | OUTPATIENT
Start: 2024-06-24 | End: 2024-06-24 | Stop reason: HOSPADM

## 2024-06-24 RX ORDER — IBUPROFEN 800 MG/1
800 TABLET ORAL EVERY 6 HOURS PRN
Qty: 120 TABLET | Refills: 0 | Status: SHIPPED | OUTPATIENT
Start: 2024-06-24 | End: 2024-07-24

## 2024-06-24 RX ADMIN — TRIAMCINOLONE ACETONIDE 40 MG: 40 INJECTION, SUSPENSION INTRA-ARTICULAR; INTRAMUSCULAR at 08:06

## 2024-06-24 NOTE — PROGRESS NOTES
Subjective:      Patient ID: Kristine Ortiz is a 63 y.o. female.    Chief Complaint: Pain of the Left Knee and Pain of the Right Knee      64yo F phmx DM2 presents for follow-up bilateral knee pain.  She has not been seen in 3 years.  She was given a Kenalog injection and states that she has not had knee pain until about 1 month ago after she was gardening.  Left worse than right.  Pain is anteriorly.  Worse with prolonged ambulation.  She has been taking Alleve and Tylenol with minimal relief.  She turns 64 at the end of the year and will qualify for Medicare.  She is going to retire then.        Review of Systems   Constitutional: Negative for chills and fever.   Cardiovascular:  Negative for chest pain.   Respiratory:  Negative for cough.    Hematologic/Lymphatic: Does not bruise/bleed easily.   Skin:  Negative for poor wound healing and rash.   Musculoskeletal:  Positive for arthritis, joint pain, myalgias and stiffness.   Gastrointestinal:  Negative for abdominal pain.   Genitourinary:  Negative for bladder incontinence.   Neurological:  Negative for dizziness, loss of balance and weakness.   Psychiatric/Behavioral:  Negative for altered mental status.        Review of patient's allergies indicates:   Allergen Reactions    Neosporin [benzalkonium chloride]      Rash          Current Outpatient Medications   Medication Sig Dispense Refill    amLODIPine (NORVASC) 10 MG tablet Take 10 mg by mouth once daily.      atorvastatin (LIPITOR) 40 MG tablet Take 40 mg by mouth nightly.      losartan (COZAAR) 50 MG tablet Take 50 mg by mouth once daily.      meclizine (ANTIVERT) 50 MG tablet Take 1 tablet (50 mg total) by mouth 3 (three) times daily as needed for Dizziness. 20 tablet 0    famotidine (PEPCID) 20 MG tablet Take 1 tablet (20 mg total) by mouth 2 (two) times daily as needed for Heartburn. 20 tablet 0    metFORMIN (GLUCOPHAGE) 500 MG tablet Take 500 mg by mouth.      valACYclovir (VALTREX) 1000 MG tablet Take 1  "tablet (1,000 mg total) by mouth 3 (three) times daily. for 7 days 21 tablet 0     No current facility-administered medications for this visit.        The patient's relevant past medical, surgical, and social history was reviewed in Epic.       Objective:      VITAL SIGNS: Ht 5' 1" (1.549 m)   Wt 61.7 kg (136 lb 0.4 oz)   BMI 25.70 kg/m²     General    Nursing note and vitals reviewed.  Constitutional: She is oriented to person, place, and time. She appears well-developed and well-nourished.   Neurological: She is alert and oriented to person, place, and time.     General Musculoskeletal Exam   Gait: antalgic       Right Knee Exam     Inspection   Swelling: present    Tenderness   The patient is tender to palpation of the medial joint line.    Range of Motion   Extension:  5   Flexion:  120     Tests   Ligament Examination   Lachman: normal (-1 to 2mm)     Other   Sensation: normal    Left Knee Exam     Inspection   Swelling: present    Tenderness   The patient tender to palpation of the medial joint line.    Range of Motion   Extension:  5   Flexion:  120     Tests   Stability   Lachman: normal (-1 to 2mm)     Other   Sensation: normal    Muscle Strength   Right Lower Extremity   Quadriceps:  4/5   Hamstrin/5   Left Lower Extremity   Quadriceps:  4/5   Hamstrin/5      X-ray Knee Ortho Bilateral with Flexion  Narrative: EXAMINATION:  XR KNEE ORTHO BILAT WITH FLEXION    CLINICAL HISTORY:  Pain in right knee    TECHNIQUE:  AP standing of both knees, PA flexion standing views of both knees, and Merchant views of both knees were performed.  Lateral views of both knees were also performed.    COMPARISON:  Left knee dated 2022 and bilateral knees dated 2021.    FINDINGS:  The bones are intact.  There is no evidence for acute fracture or bone destruction.  There are degenerative changes of the femorotibial and patellofemoral joints with osteophytes present.  There is marked narrowing of the medial " compartments of the femorotibial joints, more pronounced on the left than on the right resulting in bilateral genu varus deformity.  The narrowing of the medial joint spaces has progressed when compared to the prior exam.  No significant joint effusion is identified.  Soft tissues are unremarkable.  Impression: Tricompartmental degenerative changes with marked narrowing of the medial compartments of the femorotibial joints bilaterally, more pronounced on the left than on the right.  Findings have progressed when compared to prior study dated 07/13/2021.  There is resulting genu varus deformity.    Electronically signed by: Amol Still MD  Date:    06/24/2024  Time:    11:44    I have reviewed the above radiograph and agree with the findings stated by the radiologist.         Assessment:       1. Primary osteoarthritis of right knee    2. Pain in both knees, unspecified chronicity    3. Primary osteoarthritis of left knee          Plan:         Kristine was seen today for pain and pain.    Diagnoses and all orders for this visit:    Primary osteoarthritis of right knee  -     Large Joint Aspiration/Injection: bilateral knee    Pain in both knees, unspecified chronicity  -     X-ray Knee Ortho Bilateral with Flexion; Future    Primary osteoarthritis of left knee  -     Large Joint Aspiration/Injection: bilateral knee    Bilateral knee bone-on-bone osteoarthritis. We discussed the natural history of arthritis and different treatment options including conservative vs surgical management. These include anti-inflammatories, acetaminophen, bracing, formal physical therapy, injections, Iovera cryotherapy procedure  and finally surgical intervention.    She would like to try Kenalog injections again today since he is helped significantly.  Discussed other options with the patient but explained that this would be based on insurance approval.  Also recommend knee sleeves.  Follow-up will be as needed.    Diagnoses and plan  discussed with the patient, as well as the expected course and duration of his symptoms.  All questions and concerns were addressed prior to the end of the visit.   Instructed patient to call office if they have any future questions/concerns or to schedule apt. Patient will return to see me if symptoms worsen or fail to improve    Note dictated with voice recognition software, please excuse any grammatical errors.        Kelly Peralta PA-C   06/24/2024

## 2024-06-24 NOTE — PROCEDURES
Large Joint Aspiration/Injection: bilateral knee    Date/Time: 6/24/2024 8:30 AM    Performed by: Kelly Peralta PA-C  Authorized by: Kelly Peralta PA-C    Consent Done?:  Yes (Verbal)  Indications:  Pain  Site marked: the procedure site was marked    Timeout: prior to procedure the correct patient, procedure, and site was verified    Prep: patient was prepped and draped in usual sterile fashion    Local anesthesia used?: No    Local anesthetic:  Topical anesthetic and lidocaine 1% without epinephrine    Details:  Needle Size:  22 G  Ultrasonic Guidance for needle placement?: No    Approach:  Anterolateral  Location:  Knee  Laterality:  Bilateral  Site:  Bilateral knee  Medications (Right):  40 mg triamcinolone acetonide 40 mg/mL  Medications (Left):  40 mg triamcinolone acetonide 40 mg/mL  Patient tolerance:  Patient tolerated the procedure well with no immediate complications

## 2024-10-09 ENCOUNTER — APPOINTMENT (OUTPATIENT)
Dept: RADIOLOGY | Facility: HOSPITAL | Age: 64
End: 2024-10-09
Payer: COMMERCIAL

## 2024-10-09 ENCOUNTER — HOSPITAL ENCOUNTER (EMERGENCY)
Facility: HOSPITAL | Age: 64
Discharge: HOME | End: 2024-10-09
Attending: EMERGENCY MEDICINE
Payer: COMMERCIAL

## 2024-10-09 VITALS
DIASTOLIC BLOOD PRESSURE: 82 MMHG | HEIGHT: 61 IN | SYSTOLIC BLOOD PRESSURE: 139 MMHG | HEART RATE: 63 BPM | OXYGEN SATURATION: 98 % | TEMPERATURE: 98 F | BODY MASS INDEX: 24.55 KG/M2 | RESPIRATION RATE: 20 BRPM | WEIGHT: 130 LBS

## 2024-10-09 DIAGNOSIS — R05.9 COUGH: ICD-10-CM

## 2024-10-09 DIAGNOSIS — J06.9 UPPER RESPIRATORY TRACT INFECTION, UNSPECIFIED TYPE: Primary | ICD-10-CM

## 2024-10-09 LAB
CTP QC/QA: YES
INFLUENZA A ANTIGEN, POC: NEGATIVE
INFLUENZA B ANTIGEN, POC: NEGATIVE
POC RAPID STREP A: NEGATIVE
SARS-COV-2 RDRP RESP QL NAA+PROBE: NEGATIVE

## 2024-10-09 PROCEDURE — 71046 X-RAY EXAM CHEST 2 VIEWS: CPT | Mod: 26,,, | Performed by: RADIOLOGY

## 2024-10-09 PROCEDURE — 71046 X-RAY EXAM CHEST 2 VIEWS: CPT | Mod: TC,FY

## 2024-10-09 PROCEDURE — 99284 EMERGENCY DEPT VISIT MOD MDM: CPT | Mod: 25,ER

## 2024-10-09 PROCEDURE — 87880 STREP A ASSAY W/OPTIC: CPT | Mod: ER

## 2024-10-09 PROCEDURE — 87804 INFLUENZA ASSAY W/OPTIC: CPT | Mod: 59,ER

## 2024-10-09 PROCEDURE — 87635 SARS-COV-2 COVID-19 AMP PRB: CPT | Mod: ER | Performed by: EMERGENCY MEDICINE

## 2024-10-09 RX ORDER — CETIRIZINE HYDROCHLORIDE 10 MG/1
10 TABLET ORAL DAILY
Qty: 30 TABLET | Refills: 0 | Status: SHIPPED | OUTPATIENT
Start: 2024-10-09 | End: 2025-10-09

## 2024-10-09 RX ORDER — ALBUTEROL SULFATE 90 UG/1
1-2 INHALANT RESPIRATORY (INHALATION) EVERY 6 HOURS PRN
Qty: 8 G | Refills: 0 | Status: SHIPPED | OUTPATIENT
Start: 2024-10-09 | End: 2025-10-09

## 2024-10-09 RX ORDER — GUAIFENESIN 600 MG/1
1200 TABLET, EXTENDED RELEASE ORAL 2 TIMES DAILY PRN
Qty: 40 TABLET | Refills: 0 | Status: SHIPPED | OUTPATIENT
Start: 2024-10-09 | End: 2024-10-19

## 2024-10-09 RX ORDER — BENZONATATE 100 MG/1
100 CAPSULE ORAL 3 TIMES DAILY PRN
Qty: 20 CAPSULE | Refills: 0 | Status: SHIPPED | OUTPATIENT
Start: 2024-10-09 | End: 2024-10-19

## 2024-10-09 NOTE — ED PROVIDER NOTES
Encounter Date: 10/9/2024       History     Chief Complaint   Patient presents with    URI     Bad cold per pt a week ago   Wants to be tested for flu and covid      CC: URI    HPI:  This is a 63-year-old female presenting to the ED for evaluation of nasal congestion and cough.  Symptoms began last week.  She has been taking Benadryl and NyQuil with no improvement.  She has been around her grandchildren who have been sick with similar symptoms.  No recent antibiotic use.  She does smoke cigarettes.  She denies fever, chills, ear pain, sore throat, chest pain, shortness of breath.    The history is provided by the patient. No  was used.     Review of patient's allergies indicates:   Allergen Reactions    Neosporin [benzalkonium chloride]      Rash       Past Medical History:   Diagnosis Date    Arthritis     Diabetes mellitus     High cholesterol     Hypertension      Past Surgical History:   Procedure Laterality Date    c cestion      KNEE ARTHROPLASTY       No family history on file.  Social History     Tobacco Use    Smoking status: Every Day     Current packs/day: 0.50     Types: Cigarettes    Smokeless tobacco: Never   Substance Use Topics    Alcohol use: Not Currently    Drug use: Not Currently     Review of Systems   Constitutional:  Negative for chills and fever.   HENT:  Positive for congestion and rhinorrhea. Negative for sore throat.    Respiratory:  Positive for cough. Negative for shortness of breath.    Cardiovascular:  Negative for chest pain.   Gastrointestinal:  Negative for nausea.   Genitourinary:  Negative for dysuria.   Musculoskeletal:  Negative for back pain.   Skin:  Negative for rash.   Neurological:  Negative for weakness.   Hematological:  Does not bruise/bleed easily.       Physical Exam     Initial Vitals [10/09/24 1401]   BP Pulse Resp Temp SpO2   139/82 63 20 98 °F (36.7 °C) 98 %      MAP       --         Physical Exam    Constitutional: She appears well-developed  and well-nourished. She is not diaphoretic. No distress.   HENT:   Head: Normocephalic and atraumatic.   Right Ear: Hearing, tympanic membrane, external ear and ear canal normal.   Left Ear: Hearing, tympanic membrane, external ear and ear canal normal.   Nose: Mucosal edema and rhinorrhea present. Mouth/Throat: Posterior oropharyngeal erythema present. No oropharyngeal exudate.   Eyes: Conjunctivae and EOM are normal. Pupils are equal, round, and reactive to light. Right eye exhibits no discharge. Left eye exhibits no discharge.   Neck: Neck supple.   Normal range of motion.  Cardiovascular:  Normal rate, regular rhythm, normal heart sounds and intact distal pulses.           Pulmonary/Chest: Breath sounds normal. No respiratory distress.   Abdominal: Abdomen is soft. Bowel sounds are normal. There is no abdominal tenderness. No hernia. There is no rigidity, no rebound, no guarding, no tenderness at McBurney's point and negative Mcbride's sign.   Musculoskeletal:         General: Normal range of motion.      Cervical back: Normal range of motion and neck supple.     Neurological: She is alert and oriented to person, place, and time.   Skin: Skin is warm and dry.   Psychiatric: She has a normal mood and affect. Her behavior is normal.         ED Course   Procedures  Labs Reviewed   SARS-COV-2 RDRP GENE       Result Value    POC Rapid COVID Negative       Acceptable Yes      Narrative:     This test utilizes isothermal nucleic acid amplification technology to detect the SARS-CoV-2 RdRp nucleic acid segment. The analytical sensitivity (limit of detection) is 500 copies/swab.     A POSITIVE result is indicative of the presence of SARS-CoV-2 RNA; clinical correlation with patient history and other diagnostic information is necessary to determine patient infection status.    A NEGATIVE result means that SARS-CoV-2 nucleic acids are not present above the limit of detection. A NEGATIVE result should be  treated as presumptive. It does not rule out the possibility of COVID-19 and should not be the sole basis for treatment decisions. If COVID-19 is strongly suspected based on clinical and exposure history, re-testing using an alternate molecular assay should be considered.     Commercial kits are provided by [employer].       POCT INFLUENZA A/B MOLECULAR   POCT STREP A MOLECULAR   POCT STREP A, RAPID    POC Rapid Strep A negative     POCT RAPID INFLUENZA A/B    Influenza B Ag negative      Inflenza A Ag negative            Imaging Results              X-Ray Chest PA And Lateral (Final result)  Result time 10/09/24 15:22:54      Final result by Jules Murrieta MD (10/09/24 15:22:54)                   Impression:      No acute process.      Electronically signed by: Jules Murrieta MD  Date:    10/09/2024  Time:    15:22               Narrative:    EXAMINATION:  XR CHEST PA AND LATERAL    CLINICAL HISTORY:  Cough, unspecified    TECHNIQUE:  PA and lateral views of the chest were performed.    COMPARISON:  09/16/2023.    FINDINGS:  The trachea is unremarkable.  The cardiomediastinal silhouette is stable.  There is no evidence of free air beneath the hemidiaphragms.  There is unchanged elevation left hemidiaphragm.  There are no pleural effusions.  There is no evidence of a pneumothorax.  There is no evidence of pneumomediastinum.  No airspace opacity is present.  There are degenerative changes in the osseous structures.                                       Medications - No data to display  Medical Decision Making  This is an evaluation of a 63 y.o. female that presents to the Emergency Department for cough, rhinorrhea and nasal congestion for 1 week. The patient is a non-toxic, afebrile, and well appearing female. On physical exam ears and pharynx are without evidence of infection. Appears well hydrated with moist mucus membranes. Neck soft and supple with no meningeal signs or cervical lymphadenopathy. Breath  sounds are clear and equal bilaterally with no adventitious breath sounds, tachypnea or respiratory distress with room air pulse ox of 98% and no evidence of hypoxia.     Vital Signs Are Reassuring.  RESULTS:   COVID, flu, strep negative.  Chest x-ray unremarkable.    My overall impression is Viral URI. I considered, but at this time, do not suspect OM, OE, strep pharyngitis, meningitis, pneumonia, or acute bacterial sinusitis.    ED return precautions were discussed and understanding was verbalized. All questions or concerns have been addressed.     Amount and/or Complexity of Data Reviewed  Labs: ordered. Decision-making details documented in ED Course.  Radiology: ordered. Decision-making details documented in ED Course.    Risk  OTC drugs.  Prescription drug management.               ED Course as of 10/09/24 1536   Wed Oct 09, 2024   1452 Influenza B Ag: negative [MM]   1452 Inflenza A Ag: negative [MM]   1452 POC Rapid Strep A: negative [MM]   1500 SARS-CoV-2 RNA, Amplification, Qual: Negative [MM]   1525 X-Ray Chest PA And Lateral  FINDINGS:  The trachea is unremarkable.  The cardiomediastinal silhouette is stable.  There is no evidence of free air beneath the hemidiaphragms.  There is unchanged elevation left hemidiaphragm.  There are no pleural effusions.  There is no evidence of a pneumothorax.  There is no evidence of pneumomediastinum.  No airspace opacity is present.  There are degenerative changes in the osseous structures.     Impression:     No acute process.      [MM]      ED Course User Index  [MM] Jennifer Leo NP                           Clinical Impression:  Final diagnoses:  [R05.9] Cough  [J06.9] Upper respiratory tract infection, unspecified type (Primary)          ED Disposition Condition    Discharge Stable          ED Prescriptions       Medication Sig Dispense Start Date End Date Auth. Provider    albuterol (PROVENTIL/VENTOLIN HFA) 90 mcg/actuation inhaler Inhale 1-2 puffs into the lungs  every 6 (six) hours as needed for Wheezing. Rescue 8 g 10/9/2024 10/9/2025 Jennifer Leo NP    benzonatate (TESSALON) 100 MG capsule Take 1 capsule (100 mg total) by mouth 3 (three) times daily as needed for Cough. 20 capsule 10/9/2024 10/19/2024 Jennifer Leo NP    guaiFENesin (MUCINEX) 600 mg 12 hr tablet Take 2 tablets (1,200 mg total) by mouth 2 (two) times daily as needed for Congestion. 40 tablet 10/9/2024 10/19/2024 Jennifer Leo NP    cetirizine (ZYRTEC) 10 MG tablet Take 1 tablet (10 mg total) by mouth once daily. 30 tablet 10/9/2024 10/9/2025 Jennifer Leo NP          Follow-up Information       Follow up With Specialties Details Why Contact Info    Daron Slaughter MD General Practice Schedule an appointment as soon as possible for a visit  For follow-up [address] [facility] [id number]  [phone]      [facility] ED Emergency Medicine Go to  If symptoms worsen [address] [facility] [address] [phone]             Jennifer Leo NP  10/09/24 7177

## 2024-10-09 NOTE — Clinical Note
"Kristine Donis" Diana was seen and treated in our emergency department on 10/9/2024.  She may return to work on 10/10/2024.  Patient is flu, covid, and strep negative. Patient may go back to work 10/10/2024.     If you have any questions or concerns, please don't hesitate to call.       RN    "

## 2024-10-09 NOTE — Clinical Note
"Kristine Donis" Diana was seen and treated in our emergency department on 10/9/2024.  She may return to work on 10/10/2024.       If you have any questions or concerns, please don't hesitate to call.       RN    "

## 2024-10-09 NOTE — DISCHARGE INSTRUCTIONS
COVID, flu, and strep are negative.  Follow-up with your primary care doctor.  Return to the ER for new or worsening symptoms.    Thank you for coming to our Emergency Department today. It is important to remember that some problems or medical conditions are difficult to diagnose and may not be found during your Emergency Department visit.     Be sure to follow up with your primary care doctor and review all labs/imaging/tests that were performed during your ER visit with them. Some labs/tests may be outside of the normal range and require non-emergent follow-up and further investigation to help diagnose/exclude/prevent complications or other potentially serious medical conditions that were not addressed during your ER visit.    If you do not have a primary care doctor, you may contact the one listed on your discharge paperwork or you may also call the [facility] Appointment Desk at [phone] to schedule an appointment and establish care with one. It is important to your health that you have a primary care doctor.    Please take all medications as directed. All medications may potentially have side-effects and it is impossible to predict which medications may give you side-effects or what side-effects (if any) they will give you.. If you feel that you are having a negative effect or side-effect of any medication you should immediately stop taking them and seek medical attention. If you feel that you are having a life-threatening reaction call 911.    Return to the ER with any questions/concerns, new/concerning symptoms, worsening or failure to improve.     Do not drive, swim, climb to height, take a bath, operate heavy machinery, drink alcohol or take potentially sedating medications, sign any legal documents or make any important decisions for 24 hours if you have received any pain medications, sedatives or mood altering drugs during your ER visit or within 24 hours of taking them if they have been  prescribed to you.     You can find additional resources for Dentists, hearing aids, durable medical equipment, low cost pharmacies and other resources at [email or web address]    BELOW THIS LINE ONLY APPLIES IF YOU HAVE A COVID TEST PENDING OR IF YOU HAVE BEEN DIAGNOSED WITH COVID:  Please access [facility] to review the results of your test. Until the results of your COVID test return, you should isolate yourself so as not to potentially spread illness to others.   If your COVID test returns positive, you should isolate yourself so as not to spread illness to others. After five full days, if you are feeling better and you have not had fever for 24 hours, you can return to your typical daily activities, but you must wear a mask around others for an additional 5 days.   If your COVID test returns negative and you are either unvaccinated or more than six months out from your two-dose vaccine and are not yet boosted, you should still quarantine for 5 full days followed by strict mask use for an additional 5 full days.   If your COVID test returns negative and you have received your 2-dose initial vaccine as well as a booster, you should continue strict mask use for 10 full days after the exposure.  For all those exposed, best practice includes a test at day 5 after the exposure. This can be a home test or a test through one of the many testing centers throughout our community.   Masking is always advised to limit the spread of COVID. Cdc.gov is an excellent site to obtain the latest up to date recommendations regarding COVID and COVID testing.     CDC Testing and Quarantine Guidelines for patients with exposure to a known-positive COVID-19 person:  A close exposure is defined as anyone who has had an exposure (masked or unmasked) to a known COVID -19 positive person within 6 feet of someone for a cumulative total of 15 minutes or more over a 24-hour period.   Vaccinated and/or if you recently had a positive covid  test within 90 days do NOT need to quarantine after contact with someone who had COVID-19 unless you develop symptoms.   Fully vaccinated people who have not had a positive test within 90 days, should get tested 3-5 days after their exposure, even if they don't have symptoms and wear a mask indoors in public for 14 days following exposure or until their test result is negative.      Unvaccinated and/or NOT had a positive test within 90 days and meet close exposure  You are required by CDC guidelines to quarantine for at least 5 days from time of exposure followed by 5 days of strict masking. It is recommended, but not required to test after 5 days, unless you develop symptoms, in which case you should test at that time.  If you get tested after 5 days and your test is positive, your 5 day period of isolation starts the day of the positive test.    If your exposure does not meet the above definition, you can return to your normal daily activities to include social distancing, wearing a mask and frequent handwashing.      Here is a link to guidance from the CDC:  https://www.cdc.gov/media/releases/2021/s1227-isolation-quarantine-guidance.html      [facility]  Health Testing Sites:  https://ldh.la.gov/page/3934      [facility] website with testing locations and guidance:  [email or web address]

## 2024-12-08 ENCOUNTER — HOSPITAL ENCOUNTER (EMERGENCY)
Facility: HOSPITAL | Age: 64
Discharge: HOME | End: 2024-12-08
Attending: EMERGENCY MEDICINE
Payer: COMMERCIAL

## 2024-12-08 VITALS
BODY MASS INDEX: 24.17 KG/M2 | WEIGHT: 128 LBS | DIASTOLIC BLOOD PRESSURE: 69 MMHG | OXYGEN SATURATION: 98 % | TEMPERATURE: 98 F | HEIGHT: 61 IN | RESPIRATION RATE: 20 BRPM | HEART RATE: 69 BPM | SYSTOLIC BLOOD PRESSURE: 138 MMHG

## 2024-12-08 DIAGNOSIS — R42 DIZZINESS: Primary | ICD-10-CM

## 2024-12-08 DIAGNOSIS — I10 HYPERTENSION: ICD-10-CM

## 2024-12-08 LAB
ALBUMIN SERPL-MCNC: 3.6 G/DL (ref 3.3–5.5)
ALP SERPL-CCNC: 76 U/L (ref 42–141)
BILIRUB SERPL-MCNC: 0.5 MG/DL (ref 0.2–1.6)
BILIRUB SERPL-MCNC: NORMAL MG/DL
BLOOD URINE, POC: NORMAL
BUN SERPL-MCNC: 11 MG/DL (ref 7–22)
CALCIUM SERPL-MCNC: 9.6 MG/DL (ref 8–10.3)
CHLORIDE SERPL-SCNC: 109 MMOL/L (ref 98–108)
CLARITY, UA: NORMAL
COLOR, UA: NORMAL
CREAT SERPL-MCNC: 0.9 MG/DL (ref 0.6–1.2)
GLUCOSE SERPL-MCNC: 100 MG/DL (ref 73–118)
GLUCOSE UR QL STRIP: NORMAL
HCT, POC: NORMAL
HGB, POC: NORMAL (ref 14–18)
KETONES UR QL STRIP: NORMAL
LEUKOCYTE ESTERASE URINE, POC: NORMAL
MCH, POC: NORMAL
MCHC, POC: NORMAL
MCV, POC: NORMAL
MPV, POC: NORMAL
NITRITE, POC UA: NORMAL
PH, POC UA: NORMAL
POC ALT (SGPT): 19 U/L (ref 10–47)
POC AST (SGOT): 22 U/L (ref 11–38)
POC B-TYPE NATRIURETIC PEPTIDE: 8.6 PG/ML (ref 0–100)
POC CARDIAC TROPONIN I: 0 NG/ML (ref 0–0.08)
POC PLATELET COUNT: NORMAL
POC PTINR: 1.2 (ref 0.9–1.2)
POC PTWBT: 11.8 SEC (ref 9.7–14.3)
POC TCO2: 29 MMOL/L (ref 18–33)
POCT GLUCOSE: 96 MG/DL (ref 70–110)
POTASSIUM BLD-SCNC: 4.5 MMOL/L (ref 3.6–5.1)
PROTEIN, POC: 8 G/DL (ref 6.4–8.1)
PROTEIN, POC: NORMAL
RBC, POC: NORMAL
RDW, POC: NORMAL
SAMPLE: NORMAL
SAMPLE: NORMAL
SODIUM BLD-SCNC: 144 MMOL/L (ref 128–145)
SPECIFIC GRAVITY, POC UA: NORMAL
UROBILINOGEN, POC UA: NORMAL
WBC, POC: NORMAL

## 2024-12-08 PROCEDURE — 84484 ASSAY OF TROPONIN QUANT: CPT | Mod: ER

## 2024-12-08 PROCEDURE — 87186 SC STD MICRODIL/AGAR DIL: CPT | Performed by: EMERGENCY MEDICINE

## 2024-12-08 PROCEDURE — 93010 ELECTROCARDIOGRAM REPORT: CPT | Mod: ,,, | Performed by: INTERNAL MEDICINE

## 2024-12-08 PROCEDURE — 85025 COMPLETE CBC W/AUTO DIFF WBC: CPT | Mod: ER

## 2024-12-08 PROCEDURE — 87086 URINE CULTURE/COLONY COUNT: CPT | Performed by: EMERGENCY MEDICINE

## 2024-12-08 PROCEDURE — 82962 GLUCOSE BLOOD TEST: CPT | Mod: ER

## 2024-12-08 PROCEDURE — 25000003 PHARM REV CODE 250: Mod: ER | Performed by: EMERGENCY MEDICINE

## 2024-12-08 PROCEDURE — 80053 COMPREHEN METABOLIC PANEL: CPT | Mod: ER

## 2024-12-08 PROCEDURE — 87088 URINE BACTERIA CULTURE: CPT | Performed by: EMERGENCY MEDICINE

## 2024-12-08 PROCEDURE — 93005 ELECTROCARDIOGRAM TRACING: CPT | Mod: ER

## 2024-12-08 PROCEDURE — 99285 EMERGENCY DEPT VISIT HI MDM: CPT | Mod: 25,ER

## 2024-12-08 PROCEDURE — 83880 ASSAY OF NATRIURETIC PEPTIDE: CPT | Mod: ER

## 2024-12-08 PROCEDURE — 87077 CULTURE AEROBIC IDENTIFY: CPT | Performed by: EMERGENCY MEDICINE

## 2024-12-08 RX ORDER — MECLIZINE HYDROCHLORIDE 25 MG/1
25 TABLET ORAL
Status: COMPLETED | OUTPATIENT
Start: 2024-12-08 | End: 2024-12-08

## 2024-12-08 RX ORDER — MECLIZINE HYDROCHLORIDE 25 MG/1
25 TABLET ORAL 3 TIMES DAILY PRN
Qty: 20 TABLET | Refills: 0 | Status: SHIPPED | OUTPATIENT
Start: 2024-12-08

## 2024-12-08 RX ADMIN — MECLIZINE HYDROCHLORIDE 25 MG: 25 TABLET ORAL at 06:12

## 2024-12-08 NOTE — Clinical Note
"Kristine Donis" Diana was seen and treated in our emergency department on 12/8/2024.  She may return to work on 12/10/2024.       If you have any questions or concerns, please don't hesitate to call.       RN    " [FreeTextEntry2] : s/p Right ankle ORIF with continued pain.

## 2024-12-08 NOTE — ED PROVIDER NOTES
Encounter Date: 12/8/2024       History     Chief Complaint   Patient presents with    Dizziness     DIZZINESS x 1 WEEK   DENIES n/v  NO SLURRED SPEECH   NO FACIAL DROOPING  REPORTS OFF AND ON BLURRED VISION   REPORTS WHEN SHE STANDS THE DIZZINESS GETS WORSE        63-year-old female with past medical history of high blood pressure and diabetes presents with chief complaint of dizziness and feeling off balance x1 week.  Patient states she has no numbness no tingling no weakness.  She does feel like she is going to fall when she stands up.  Patient has been having a bad cold for the last week and a half cough green slime is coming up when she coughs.  Patient states she has no fevers and no chills.  Patient has been taking NyQuil for her symptoms.  Patient has not taken her blood pressure medicines in about a week.  Patient reports she has no nausea no vomiting no diarrhea.  Patient reports a history of diabetes and high blood pressure.  Patient reports smoking half a pack of cigarettes a day.  Denies drinking alcohol and using marijuana or cocaine.        Review of patient's allergies indicates:   Allergen Reactions    Neosporin [benzalkonium chloride]      Rash       Past Medical History:   Diagnosis Date    Arthritis     Diabetes mellitus     High cholesterol     Hypertension      Past Surgical History:   Procedure Laterality Date    c cestion      KNEE ARTHROPLASTY       No family history on file.  Social History     Tobacco Use    Smoking status: Every Day     Current packs/day: 0.50     Types: Cigarettes    Smokeless tobacco: Never   Substance Use Topics    Alcohol use: Not Currently    Drug use: Not Currently     Review of Systems   Constitutional:  Negative for fever.   HENT:  Negative for sore throat.    Respiratory:  Positive for cough. Negative for shortness of breath.    Cardiovascular:  Negative for chest pain.   Gastrointestinal:  Negative for nausea.   Genitourinary:  Negative for dysuria.    Musculoskeletal:  Negative for back pain.   Skin:  Negative for rash.   Neurological:  Positive for dizziness. Negative for weakness.   Hematological:  Does not bruise/bleed easily.   All other systems reviewed and are negative.      Physical Exam     Initial Vitals [12/08/24 1400]   BP Pulse Resp Temp SpO2   (S) (!) 181/87 76 20 98.3 °F (36.8 °C) 98 %      MAP       --         Physical Exam    Nursing note and vitals reviewed.  Constitutional: She appears well-developed and well-nourished.   HENT:   Head: Normocephalic and atraumatic.   Right Ear: External ear normal.   Left Ear: External ear normal.   Eyes: Conjunctivae and EOM are normal. Pupils are equal, round, and reactive to light. Right eye exhibits no discharge. Left eye exhibits no discharge.   Neck: Neck supple.   Normal range of motion.  Cardiovascular:  Normal rate, regular rhythm, normal heart sounds and intact distal pulses.     Exam reveals no gallop and no friction rub.       No murmur heard.  Pulmonary/Chest: Breath sounds normal. No respiratory distress. She has no wheezes. She has no rhonchi. She has no rales. She exhibits no tenderness.   Abdominal: Abdomen is soft. Bowel sounds are normal. She exhibits no distension and no mass. There is no abdominal tenderness.   No CVA tenderness There is no rebound and no guarding.   Musculoskeletal:         General: No tenderness or edema. Normal range of motion.      Cervical back: Normal range of motion and neck supple.     Neurological: She is alert and oriented to person, place, and time. She has normal strength. No cranial nerve deficit or sensory deficit.   Cranial nerves 2-12 intact.  Sensation grossly intact.   equal bilateral.  Muscle strength 5/5 bilateral upper and lower extremities.   Skin: Skin is warm and dry. Capillary refill takes less than 2 seconds. No rash noted. No pallor.   Psychiatric: She has a normal mood and affect.         ED Course   Procedures  Labs Reviewed   CULTURE,  URINE - Abnormal       Result Value    Urine Culture, Routine   (*)     Value: ENTEROCOCCUS FAECALIS  10,000 - 49,999 cfu/ml      Narrative:     Indicated criteria for high risk culture:->Other  Other (specify):->UTI   POCT CMP - Abnormal    Albumin, POC 3.6      Alkaline Phosphatase, POC 76      ALT (SGPT), POC 19      AST (SGOT), POC 22      POC BUN 11      Calcium, POC 9.6      POC Chloride 109 (*)     POC Creatinine 0.9      POC Glucose 100      POC Potassium 4.5      POC Sodium 144      Bilirubin, POC 0.5      POC TCO2 29      Protein, POC 8.0     TROPONIN ISTAT    POC Cardiac Troponin I 0.00      Sample unknown     POCT CBC    Hematocrit        Hemoglobin        RBC        WBC        MCV        MCH, POC        MCHC        RDW-CV        Platelet Count, POC        MPV       POCT GLUCOSE MONITORING CONTINUOUS   POCT URINALYSIS W/O SCOPE    Color, UA POC        Clarity, UA, POC        Spec Grav UA        pH, UA        WBC, UA        Nitrite, UA        Protein, POC        Glucose, UA        Ketones, UA        Bilirubin, POC        Urobilinogen, UA        Blood, UA       POCT GLUCOSE    POCT Glucose 96     POCT CMP   POCT PROTIME-INR   POCT TROPONIN   POCT B-TYPE NATRIURETIC PEPTIDE (BNP)   ISTAT PROCEDURE    POC PTWBT 11.8      POC PTINR 1.2      Sample unknown     POCT B-TYPE NATRIURETIC PEPTIDE (BNP)    POC B-Type Natriuretic Peptide 8.6          ECG Results              EKG 12-lead (Final result)        Collection Time Result Time QRS Duration [facility] QTC Calculation    12/08/24 17:18:43 12/09/24 22:22:29 68 430                     Final result by Interface, Lab In [facility] (12/09/24 22:22:31)                   Narrative:    Test Reason : R42,    Vent. Rate :  71 BPM     Atrial Rate :  71 BPM     P-R Int : 178 ms          QRS Dur :  68 ms      QT Int : 396 ms       P-R-T Axes :  55  -9  42 degrees    QTcB Int : 430 ms    Normal sinus rhythm  Low voltage QRS  Cannot rule out Anterior infarct (cited on or before  17-Jan-2023)  Abnormal ECG  When compared with ECG of 30-Mar-2023 05:30,  No significant change was found  Confirmed by Venkatesh Vazquez ([id number]) on 12/9/2024 10:22:25 PM    Referred By: AAAREFERRAL SELF           Confirmed By: Venkatesh Vazquez                                  Imaging Results              X-Ray Chest PA And Lateral (Final result)  Result time 12/08/24 18:11:20      Final result by Antonio Gibbs MD (12/08/24 18:11:20)                   Impression:      1. Interstitial findings are accentuated by habitus and shallow inspiratory effort, no large focal consolidation.      Electronically signed by: Antonio Gibbs MD  Date:    12/08/2024  Time:    18:11               Narrative:    EXAMINATION:  XR CHEST PA AND LATERAL    CLINICAL HISTORY:  Productive cough;    TECHNIQUE:  PA and lateral views of the chest were performed.    COMPARISON:  10/09/2024    FINDINGS:  The cardiomediastinal silhouette is not enlarged.  There is no pleural effusion.  The trachea is midline.  The lungs are symmetrically expanded bilaterally with mildly coarse interstitial attenuation.  There is left basilar subsegmental atelectasis..  No large focal consolidation seen.  There is no pneumothorax.  The osseous structures are remarkable for degenerative change..                                       CT Head Without Contrast (Final result)  Result time 12/08/24 17:25:11      Final result by Paula Lechuga MD (12/08/24 17:25:11)                   Impression:      No acute intracranial abnormality detected.      Electronically signed by: Paula Lechuga  Date:    12/08/2024  Time:    17:25               Narrative:    EXAMINATION:  CT OF THE HEAD WITHOUT    CLINICAL HISTORY:  Dizziness, persistent/recurrent, cardiac or vascular cause suspected;    TECHNIQUE:  5 mm unenhanced axial images were obtained from the skull base to the vertex.    COMPARISON:  09/16/2023    FINDINGS:  The ventricles, basal cisterns, and cortical sulci are within  normal limits for patient's stated age. There is no acute intracranial hemorrhage, territorial infarct or mass effect, or midline shift. In the visualized paranasal sinuses and mastoid air cells, there is mucoperiosteal thickening in the sphenoid sinuses left greater than right.  Clefts are seen in the anterior and posterior arch of C1, which are normal variants.                                       Medications   meclizine tablet 25 mg (25 mg Oral Given 12/8/24 1830)     Medical Decision Making  Medical Decision Making:    This is an evaluation of a 63 y.o. female that presents to the Emergency Department for   Chief Complaint   Patient presents with    Dizziness     DIZZINESS x 1 WEEK   DENIES n/v  NO SLURRED SPEECH   NO FACIAL DROOPING  REPORTS OFF AND ON BLURRED VISION   REPORTS WHEN SHE STANDS THE DIZZINESS GETS WORSE          The patient is a non-toxic and well appearing patient. On physical exam, patient appears well hydrated with moist mucus membranes. Breath sounds are clear and equal bilaterally with no adventitious breath sounds, tachypnea or respiratory distress. Regular rate and rhythm. No murmurs. Abdomen soft and non tender. Patient is tolerating PO without difficulty. Physical exam otherwise as above.     I have reviewed vital signs and nursing notes.   Vital Signs Are Reassuring.     Based on the patient's symptoms, I am considering and evaluating for the following differential diagnoses:  Dizziness, vertigo, CVA, hypertension, uncontrolled hypertension, bronchitis, and pneumonia    Consider hospitalization for: dizziness    Patient is agreeable to transfer and admission to [facility] if necessary    ED Course:Treatment in the ED included Physical Exam and medications given in ED  Medications   meclizine tablet 25 mg (25 mg Oral Given 12/8/24 1830)   .   Patient reports feeling better after treatment in the ER.       External Data/Documents Reviewed: Previous medical records and  vital signs reviewed, see HPI and Physical exam.   Labs: ordered and reviewed.  Glucose within normal limits at 96.  Radiology: ordered as indicated and reviewed.  No pneumothorax.  ECG/medicine tests: ordered and independent interpretation performed by Dr. Emily Quinn DO. Decision-making details documented in ED Course.   Cardiac monitor placed for dizziness. Monitor shows Normal Sinus Rhythm with  rate of 75. Interpreted by Dr. Emily Quinn DO.    Risk  Diagnosis or treatment significantly limited by the following social determinants of health: Body mass index is 24.19 kg/m².       Turned care patient over to Dr. Ruano.  We discussed patient's presentation, vital signs, ED course, treatment, pending re-evaluation, pending labs and resolution of dizziness.              At time of turnover patient is awake alert oriented x4 speaking clearly in full sentences and moving all 4 extremities.     The following labs and imaging were reviewed:      Admission on 12/08/2024, Discharged on 12/08/2024   Component Date Value Ref Range Status    QRS Duration 12/08/2024 68  ms Final    [facility] QTC Calculation 12/08/2024 430  ms Final    POCT Glucose 12/08/2024 96  70 - 110 mg/dL Final    POC PTWBT 12/08/2024 11.8  9.7 - 14.3 sec Final    POC PTINR 12/08/2024 1.2  0.9 - 1.2 Final    Sample 12/08/2024 unknown   Final    POC Cardiac Troponin I 12/08/2024 0.00  0.00 - 0.08 ng/mL Final    Sample 12/08/2024 unknown   Final    Comment: A single negative troponin is insufficient to rule out myocardial infarction.  The use of a serial sampling protocol is recommended practice. Correlate results with reference intervals established for methodology used. Point of care and core laboratory   troponin results are not interchangeable.      Albumin, POC 12/08/2024 3.6  3.3 - 5.5 g/dL Final    Alkaline Phosphatase, POC 12/08/2024 76  42 - 141 U/L Final    ALT (SGPT), POC 12/08/2024 19  10 - 47 U/L Final    AST (SGOT), POC 12/08/2024 22  11 - 38 U/L  Final    POC BUN 12/08/2024 11  7 - 22 mg/dL Final    Calcium, POC 12/08/2024 9.6  8.0 - 10.3 mg/dL Final    POC Chloride 12/08/2024 109 (H)  98 - 108 mmol/L Final    POC Creatinine 12/08/2024 0.9  0.6 - 1.2 mg/dL Final    POC Glucose 12/08/2024 100  73 - 118 mg/dL Final    POC Potassium 12/08/2024 4.5  3.6 - 5.1 mmol/L Final    POC Sodium 12/08/2024 144  128 - 145 mmol/L Final    Bilirubin, POC 12/08/2024 0.5  0.2 - 1.6 mg/dL Final    POC TCO2 12/08/2024 29  18 - 33 mmol/L Final    Protein, POC 12/08/2024 8.0  6.4 - 8.1 g/dL Final    POC B-Type Natriuretic Peptide 12/08/2024 8.6  0.0 - 100.0 pg/mL Final    Urine Culture, Routine 12/08/2024  (A)   Final                    Value:ENTEROCOCCUS FAECALIS  10,000 - 49,999 cfu/ml          Imaging Results              X-Ray Chest PA And Lateral (Final result)  Result time 12/08/24 18:11:20      Final result by Antonio Gibbs MD (12/08/24 18:11:20)                   Impression:      1. Interstitial findings are accentuated by habitus and shallow inspiratory effort, no large focal consolidation.      Electronically signed by: Antonio Gibbs MD  Date:    12/08/2024  Time:    18:11               Narrative:    EXAMINATION:  XR CHEST PA AND LATERAL    CLINICAL HISTORY:  Productive cough;    TECHNIQUE:  PA and lateral views of the chest were performed.    COMPARISON:  10/09/2024    FINDINGS:  The cardiomediastinal silhouette is not enlarged.  There is no pleural effusion.  The trachea is midline.  The lungs are symmetrically expanded bilaterally with mildly coarse interstitial attenuation.  There is left basilar subsegmental atelectasis..  No large focal consolidation seen.  There is no pneumothorax.  The osseous structures are remarkable for degenerative change..                                       CT Head Without Contrast (Final result)  Result time 12/08/24 17:25:11      Final result by Paula Lechuga MD (12/08/24 17:25:11)                   Impression:      No  acute intracranial abnormality detected.      Electronically signed by: Paula Lechuga  Date:    12/08/2024  Time:    17:25               Narrative:    EXAMINATION:  CT OF THE HEAD WITHOUT    CLINICAL HISTORY:  Dizziness, persistent/recurrent, cardiac or vascular cause suspected;    TECHNIQUE:  5 mm unenhanced axial images were obtained from the skull base to the vertex.    COMPARISON:  09/16/2023    FINDINGS:  The ventricles, basal cisterns, and cortical sulci are within normal limits for patient's stated age. There is no acute intracranial hemorrhage, territorial infarct or mass effect, or midline shift. In the visualized paranasal sinuses and mastoid air cells, there is mucoperiosteal thickening in the sphenoid sinuses left greater than right.  Clefts are seen in the anterior and posterior arch of C1, which are normal variants.                                               ED Course as of 12/12/24 0627   Sun Dec 08, 2024   1725 EKG interpreted by Dr. Quinn.  No STEMI.  Normal sinus rhythm, ventricular rate of 71.  Leftward axis.  Abnormal EKG.  QTC within normal limits at 430.  When compared to prior EKG done on 03/30/2023 rate has decreased by 13 beats per minute today [RF]   1814 Patient reports resolution of dizziness.  Patient states she is feeling much better. [RF]   1819 POCT URINALYSIS W/O SCOPE [RF]   1820 POCT URINALYSIS W/O SCOPE [RF]      ED Course User Index  [RF] Emily Quinn DO                           Clinical Impression:  Final diagnoses:  [R42] Dizziness (Primary)  [I10] Hypertension          ED Disposition Condition    Discharge Stable          ED Prescriptions       Medication Sig Dispense Start Date End Date Auth. Provider    meclizine (ANTIVERT) 25 mg tablet Take 1 tablet (25 mg total) by mouth 3 (three) times daily as needed for Dizziness. 20 tablet 12/8/2024 -- Emily Quinn DO          Follow-up Information       Follow up With Specialties Details Why Contact Info    The nearest  emergency department.  Go to  As needed, If symptoms worsen     Your PCP  Call  As needed, for ongoing care              Emily Quinn, DO  12/12/24 0628       Emily Quinn, DO  12/12/24 0630

## 2024-12-09 ENCOUNTER — TELEPHONE (OUTPATIENT)
Dept: OTOLARYNGOLOGY | Facility: CLINIC | Age: 64
End: 2024-12-09
Payer: COMMERCIAL

## 2024-12-09 LAB
OHS QRS DURATION: 68 MS
OHS QTC CALCULATION: 430 MS

## 2024-12-09 NOTE — ED PROVIDER NOTES
Care turned over from Dr Quinn pending UA, reassessment and final disposition. Dizziness and elevated BP resolved at time of transfer of care. Will discharge after UA results.  Chloe Ruano MD, Emergency Medicine Staff 6:15 PM 12/8/2024    Labs Reviewed    Labs Reviewed        Admission on 12/08/2024, Discharged on 12/08/2024   Component Date Value Ref Range Status    POCT Glucose 12/08/2024 96  70 - 110 mg/dL Final    POC PTWBT 12/08/2024 11.8  9.7 - 14.3 sec Final    POC PTINR 12/08/2024 1.2  0.9 - 1.2 Final    Sample 12/08/2024 unknown   Final    POC Cardiac Troponin I 12/08/2024 0.00  0.00 - 0.08 ng/mL Final    Sample 12/08/2024 unknown   Final    Comment: A single negative troponin is insufficient to rule out myocardial infarction.  The use of a serial sampling protocol is recommended practice. Correlate results with reference intervals established for methodology used. Point of care and core laboratory   troponin results are not interchangeable.      Albumin, POC 12/08/2024 3.6  3.3 - 5.5 g/dL Final    Alkaline Phosphatase, POC 12/08/2024 76  42 - 141 U/L Final    ALT (SGPT), POC 12/08/2024 19  10 - 47 U/L Final    AST (SGOT), POC 12/08/2024 22  11 - 38 U/L Final    POC BUN 12/08/2024 11  7 - 22 mg/dL Final    Calcium, POC 12/08/2024 9.6  8.0 - 10.3 mg/dL Final    POC Chloride 12/08/2024 109 (H)  98 - 108 mmol/L Final    POC Creatinine 12/08/2024 0.9  0.6 - 1.2 mg/dL Final    POC Glucose 12/08/2024 100  73 - 118 mg/dL Final    POC Potassium 12/08/2024 4.5  3.6 - 5.1 mmol/L Final    POC Sodium 12/08/2024 144  128 - 145 mmol/L Final    Bilirubin, POC 12/08/2024 0.5  0.2 - 1.6 mg/dL Final    POC TCO2 12/08/2024 29  18 - 33 mmol/L Final    Protein, POC 12/08/2024 8.0  6.4 - 8.1 g/dL Final    POC B-Type Natriuretic Peptide 12/08/2024 8.6  0.0 - 100.0 pg/mL Final        Imaging Reviewed    Imaging Results              X-Ray Chest PA And Lateral (Final result)  Result time 12/08/24 18:11:20      Final result  by Antonio Gibbs MD (12/08/24 18:11:20)                   Impression:      1. Interstitial findings are accentuated by habitus and shallow inspiratory effort, no large focal consolidation.      Electronically signed by: Antonio Gibbs MD  Date:    12/08/2024  Time:    18:11               Narrative:    EXAMINATION:  XR CHEST PA AND LATERAL    CLINICAL HISTORY:  Productive cough;    TECHNIQUE:  PA and lateral views of the chest were performed.    COMPARISON:  10/09/2024    FINDINGS:  The cardiomediastinal silhouette is not enlarged.  There is no pleural effusion.  The trachea is midline.  The lungs are symmetrically expanded bilaterally with mildly coarse interstitial attenuation.  There is left basilar subsegmental atelectasis..  No large focal consolidation seen.  There is no pneumothorax.  The osseous structures are remarkable for degenerative change..                                       CT Head Without Contrast (Final result)  Result time 12/08/24 17:25:11      Final result by Paula Lechuga MD (12/08/24 17:25:11)                   Impression:      No acute intracranial abnormality detected.      Electronically signed by: Paula Lechuga  Date:    12/08/2024  Time:    17:25               Narrative:    EXAMINATION:  CT OF THE HEAD WITHOUT    CLINICAL HISTORY:  Dizziness, persistent/recurrent, cardiac or vascular cause suspected;    TECHNIQUE:  5 mm unenhanced axial images were obtained from the skull base to the vertex.    COMPARISON:  09/16/2023    FINDINGS:  The ventricles, basal cisterns, and cortical sulci are within normal limits for patient's stated age. There is no acute intracranial hemorrhage, territorial infarct or mass effect, or midline shift. In the visualized paranasal sinuses and mastoid air cells, there is mucoperiosteal thickening in the sphenoid sinuses left greater than right.  Clefts are seen in the anterior and posterior arch of C1, which are normal variants.                                       Medications given in ED    Medications   meclizine tablet 25 mg (25 mg Oral Given 12/8/24 1830)         Note was created using voice recognition software. Note may have occasional typographical errors that may not have been identified and edited despite good padilla initial review prior to signing.    ED Course as of 12/08/24 1908   Sun Dec 08, 2024   1725 EKG interpreted by Dr. Quinn.  No STEMI.  Normal sinus rhythm, ventricular rate of 71.  Leftward axis.  Abnormal EKG.  QTC within normal limits at 430.  When compared to prior EKG done on 03/30/2023 rate has decreased by 13 beats per minute today [RF]   1814 Patient reports resolution of dizziness.  Patient states she is feeling much better. [RF]   1819 POCT URINALYSIS W/O SCOPE [RF]   1820 POCT URINALYSIS W/O SCOPE [RF]      ED Course User Index  [RF] Emily Quinn DO         Clinical Impression    :  Dizziness (Primary)  Hypertension      Discharge Medications    ED Prescriptions       Medication Sig Dispense Start Date End Date Auth. Provider    meclizine (ANTIVERT) 25 mg tablet Take 1 tablet (25 mg total) by mouth 3 (three) times daily as needed for Dizziness. 20 tablet 12/8/2024 -- Emily Quinn DO             Follow Up    Follow-up Information       Follow up With Specialties Details Why Contact Info    The nearest emergency department.  Go to  As needed, If symptoms worsen     Your PCP  Call  As needed, for ongoing care                   Chloe Ruano MD  12/08/24 1908

## 2024-12-11 LAB — BACTERIA UR CULT: ABNORMAL

## 2024-12-12 ENCOUNTER — TELEPHONE (OUTPATIENT)
Dept: EMERGENCY MEDICINE | Facility: HOSPITAL | Age: 64
End: 2024-12-12
Payer: COMMERCIAL

## 2024-12-12 RX ORDER — NITROFURANTOIN 25; 75 MG/1; MG/1
100 CAPSULE ORAL 2 TIMES DAILY
Qty: 10 CAPSULE | Refills: 0 | Status: SHIPPED | OUTPATIENT
Start: 2024-12-12 | End: 2024-12-17

## 2024-12-12 NOTE — TELEPHONE ENCOUNTER
Call Attempt: # 1. CrCl: 57.92 mL/min   mL/min.    Review of patient's allergies indicates:   Allergen Reactions    Neosporin [benzalkonium chloride]      Rash         Urine Culture: Urine Culture positive. Discharged home with no abx.  The patient was notified. Macrobid will be sent to [facility] on [address]. She was told to begin the new prescription and follow up with Primary care. ED return precautions were given.

## 2025-01-06 ENCOUNTER — NURSE TRIAGE (OUTPATIENT)
Dept: ADMINISTRATIVE | Facility: CLINIC | Age: 65
End: 2025-01-06
Payer: COMMERCIAL

## 2025-01-06 ENCOUNTER — TELEPHONE (OUTPATIENT)
Dept: ORTHOPEDICS | Facility: CLINIC | Age: 65
End: 2025-01-06
Payer: COMMERCIAL

## 2025-01-06 NOTE — TELEPHONE ENCOUNTER
----- Message from Veronica sent at 1/6/2025  1:29 PM CST -----  Type:  Sooner Apoointment Request    Caller is requesting a sooner appointment.   Caller will  accept being placed on the waitlist and is requesting a message be sent to doctor.  Name of Caller:pt  When is the first available appointment?none in epic  Symptoms:knee pain Discuss ongoing pain or problem  Would the patient rather a call back or a response via [facility]? call  Best Call Back Number: [phone]  Additional Information:   Solaraze Counseling:  I discussed with the patient the risks of Solaraze including but not limited to erythema, scaling, itching, weeping, crusting, and pain.

## 2025-01-06 NOTE — TELEPHONE ENCOUNTER
LVM for patient that Arron has a 3:30 appt tomorrow, however, her insurance has no benefits at this location and would have to pay out of pocket.  Requested call back.

## 2025-01-06 NOTE — TELEPHONE ENCOUNTER
Pt is having knee pain. No fever. Able to move but if she walks for a certain amount of time she needs to sit down. 7-8/10 on a pain scale and pt states that this is severe for her. Pt. Would like to be seen today by Dr. Maya for a shot. Care Advice recommends that pt be examined today. Messaged sent to Dr. Peralta as high priority. Pt. Verbalized understanding of all information given. call. Pt. Instructed to go to the nearest ER or Urgent care if she has not heard back from someone within an hour. Pt verbalized understanding. Please call and advise pt, pt is awaiting a return call.           Reason for Disposition   SEVERE pain (e.g., excruciating, unable to walk)    Additional Information   Negative: Sounds like a life-threatening emergency to the triager   Negative: Followed a knee injury   Negative: Swollen knee joint and fever   Negative: Patient sounds very sick or weak to the triager   Negative: Can't move swollen joint at all   Negative: Thigh or calf pain and only 1 side and present > 1 hour   Negative: Thigh or calf swelling and only 1 side    Protocols used: Knee Pain-A-[place]

## 2025-01-08 ENCOUNTER — TELEPHONE (OUTPATIENT)
Dept: ORTHOPEDICS | Facility: CLINIC | Age: 65
End: 2025-01-08
Payer: COMMERCIAL

## 2025-01-08 NOTE — TELEPHONE ENCOUNTER
----- Message from Naty sent at 1/8/2025 11:58 AM CST -----  Pt Requesting Call Back    Who called: pt  Who called for pt:  Best call back #: [phone]  Add notes: pt said she was told that her insurance doesn't cover her 1/9/25 appt and she wants to know why when she have insurance; pt wants a call back today

## 2025-01-08 NOTE — TELEPHONE ENCOUNTER
LVM for patient to call her insurance company to find out why she is out of network at this facility.

## 2025-02-20 ENCOUNTER — TELEPHONE (OUTPATIENT)
Dept: ORTHOPEDICS | Facility: CLINIC | Age: 65
End: 2025-02-20
Payer: COMMERCIAL

## 2025-02-20 NOTE — TELEPHONE ENCOUNTER
----- Message from Kim sent at 2/20/2025  9:00 AM CST -----  Regarding: Call back  Contact: [phone]  Type:  Sooner Apoointment RequestCaller is requesting a sooner appointment.  Caller declined first available appointment listed below.  Caller will not accept being placed on the waitlist and is requesting a message be sent to doctor.Name of Caller: PT When is the first available appointment? Symptoms: Pain on both knees Would the patient rather a call back or a response via [facility]? Call back Best Call Back Number: [phone] Information:

## 2025-02-20 NOTE — TELEPHONE ENCOUNTER
Spoke with patient.  Appointment made for eric knee pain with [facility]  next week.  Informed that she is out of network and most likely will pay out of pocket. SHAUNA

## 2025-02-21 ENCOUNTER — TELEPHONE (OUTPATIENT)
Dept: ORTHOPEDICS | Facility: CLINIC | Age: 65
End: 2025-02-21
Payer: COMMERCIAL

## 2025-02-21 NOTE — TELEPHONE ENCOUNTER
----- Message from Kit sent at 2/21/2025  8:28 AM CST -----  Contact: pt  Type: Requesting to speak with nurseTorreso Called: PTRegarding: insurance issues Would the patient rather a call back or a response via [facility]? Call Oh Call Back Number: [phone] Additional Information:

## 2025-02-21 NOTE — TELEPHONE ENCOUNTER
----- Message from Hannah sent at 2/21/2025  9:00 AM CST -----  Type:  Needs Medical AdviceWho Called: Keira Call Back Number: [phone] Information: Patient is requesting a call back

## 2025-02-21 NOTE — TELEPHONE ENCOUNTER
Reports having 4 bowel movements so far today   The past few days he lost count of of episodes   He is having urgency   He is taking imodium for diarrhea  Denies blood in stool   He did agree to have stool orders placed.    Spoke with patient.  Informed her that her [facility] shows out of network.  She states that she has [employer].  Gave her number to financial to try and straighten out before her upcoming appointment. HAMZAH

## 2025-02-21 NOTE — TELEPHONE ENCOUNTER
----- Message from Kit sent at 2/21/2025  8:28 AM CST -----  Contact: pt  Type: Requesting to speak with nurseTorreso Called: PTRegarding: insurance issues Would the patient rather a call back or a response via [facility]? Call Oh Call Back Number: [phone] Additional Information:

## 2025-03-14 ENCOUNTER — TELEPHONE (OUTPATIENT)
Dept: ORTHOPEDICS | Facility: CLINIC | Age: 65
End: 2025-03-14
Payer: COMMERCIAL

## 2025-03-14 NOTE — TELEPHONE ENCOUNTER
----- Message from Kim sent at 3/14/2025 12:22 PM CDT -----  Regarding: Call back  Contact: [phone]  Type:  Patient Returning CallWho Called: PT Who Left Message for Patient: Nurse Does the patient know what this is regarding?: yes Would the patient rather a call back or a response via [facility]? Call back Best Call Back Number: [phone] Information:

## 2025-03-14 NOTE — TELEPHONE ENCOUNTER
----- Message from Naty sent at 3/14/2025  8:22 AM CDT -----  Pt Requesting to Schedule an Appointment Pt is requesting to schedule an appointment that our scheduling dept cannot schedule.Who called: ptBest call back #:  [phone] pt wants appt: prefers 3/17/25Reason for appt:  injection in kneesAdditional notes:

## 2025-03-25 ENCOUNTER — TELEPHONE (OUTPATIENT)
Dept: ORTHOPEDICS | Facility: CLINIC | Age: 65
End: 2025-03-25
Payer: COMMERCIAL

## 2025-03-25 NOTE — TELEPHONE ENCOUNTER
----- Message from Brinda sent at 3/25/2025 10:55 AM CDT -----  Contact: pt  Type:  Sooner Appointment RequestCaller is requesting a sooner appointment.  Caller declined first available appointment listed below.  Caller will not accept being placed on the waitlist and is requesting a message be sent to doctor.Name of Caller:pt When is the first available appointment?books are closed Symptoms:injection Would the patient rather a call back or a response via [facility]? Call Best Call Back Number:[phone] Information:

## 2025-03-27 ENCOUNTER — OFFICE VISIT (OUTPATIENT)
Dept: ORTHOPEDICS | Facility: CLINIC | Age: 65
End: 2025-03-27
Payer: COMMERCIAL

## 2025-03-27 VITALS — WEIGHT: 142 LBS | BODY MASS INDEX: 26.81 KG/M2 | HEIGHT: 61 IN

## 2025-03-27 DIAGNOSIS — M17.12 PRIMARY OSTEOARTHRITIS OF LEFT KNEE: ICD-10-CM

## 2025-03-27 DIAGNOSIS — M17.11 PRIMARY OSTEOARTHRITIS OF RIGHT KNEE: Primary | ICD-10-CM

## 2025-03-27 PROCEDURE — 20610 DRAIN/INJ JOINT/BURSA W/O US: CPT | Mod: 50,S$GLB,, | Performed by: PHYSICIAN ASSISTANT

## 2025-03-27 PROCEDURE — 1159F MED LIST DOCD IN RCRD: CPT | Mod: CPTII,S$GLB,, | Performed by: PHYSICIAN ASSISTANT

## 2025-03-27 PROCEDURE — 99999 PR PBB SHADOW E&M-EST. PATIENT-LVL III: CPT | Mod: PBBFAC,,, | Performed by: PHYSICIAN ASSISTANT

## 2025-03-27 PROCEDURE — 1101F PT FALLS ASSESS-DOCD LE1/YR: CPT | Mod: CPTII,S$GLB,, | Performed by: PHYSICIAN ASSISTANT

## 2025-03-27 PROCEDURE — 1125F AMNT PAIN NOTED PAIN PRSNT: CPT | Mod: CPTII,S$GLB,, | Performed by: PHYSICIAN ASSISTANT

## 2025-03-27 PROCEDURE — 3008F BODY MASS INDEX DOCD: CPT | Mod: CPTII,S$GLB,, | Performed by: PHYSICIAN ASSISTANT

## 2025-03-27 PROCEDURE — 3288F FALL RISK ASSESSMENT DOCD: CPT | Mod: CPTII,S$GLB,, | Performed by: PHYSICIAN ASSISTANT

## 2025-03-27 PROCEDURE — 99213 OFFICE O/P EST LOW 20 MIN: CPT | Mod: 25,S$GLB,, | Performed by: PHYSICIAN ASSISTANT

## 2025-03-27 RX ORDER — CELECOXIB 200 MG/1
200 CAPSULE ORAL 2 TIMES DAILY
Qty: 60 CAPSULE | Refills: 1 | Status: SHIPPED | OUTPATIENT
Start: 2025-03-27 | End: 2025-05-26

## 2025-03-27 RX ORDER — KETOROLAC TROMETHAMINE 30 MG/ML
30 INJECTION, SOLUTION INTRAMUSCULAR; INTRAVENOUS
Status: DISCONTINUED | OUTPATIENT
Start: 2025-03-27 | End: 2025-03-27 | Stop reason: HOSPADM

## 2025-03-27 RX ADMIN — KETOROLAC TROMETHAMINE 30 MG: 30 INJECTION, SOLUTION INTRAMUSCULAR; INTRAVENOUS at 08:03

## 2025-03-27 NOTE — PROCEDURES
Large Joint Aspiration/Injection: bilateral knee    Date/Time: 3/27/2025 8:00 AM    Performed by: Kelly Peralta PA-C  Authorized by: Kelly Peralta PA-C    Consent Done?:  Yes (Verbal)  Indications:  Arthritis  Site marked: the procedure site was marked    Timeout: prior to procedure the correct patient, procedure, and site was verified    Prep: patient was prepped and draped in usual sterile fashion      Local anesthesia used?: Yes    Local anesthetic:  Topical anesthetic    Details:  Needle Size:  22 G  Ultrasonic Guidance for needle placement?: No    Approach:  Anterolateral  Location:  Knee  Laterality:  Bilateral  Site:  Bilateral knee  Medications (Right):  30 mg ketorolac 30 mg/mL (1 mL)  Medications (Left):  30 mg ketorolac 30 mg/mL (1 mL)  Patient tolerance:  Patient tolerated the procedure well with no immediate complications

## 2025-03-27 NOTE — PROGRESS NOTES
Subjective:      Chief Complaint: Pain of the Left Knee and Pain of the Right Knee    Patient ID: Kristine Ortiz is a 64 y.o. female.  63yo DM2 F bilateral knee bone-on-bone osteoarthritis.  Previous short-acting Kenalog injection helped for about 6 months.  She noted return of symptoms when the weather changed.  Left knee is worse than right.  Worse with ambulation.  She has been taking ibuprofen more often since the injections were off.  She wants to know what other options she has.  She is thinking about retiring in the next year and will qualify for Medicare.        Past Medical History:   Diagnosis Date    Arthritis     Diabetes mellitus     High cholesterol     Hypertension         Past Surgical History:   Procedure Laterality Date    c cestion      KNEE ARTHROPLASTY          Current Outpatient Medications   Medication Instructions    albuterol (PROVENTIL/VENTOLIN HFA) 90 mcg/actuation inhaler 1-2 puffs, Inhalation, Every 6 hours PRN, Rescue    amLODIPine (NORVASC) 10 mg, Daily    atorvastatin (LIPITOR) 40 mg, Nightly    celecoxib (CELEBREX) 200 mg, Oral, 2 times daily    cetirizine (ZYRTEC) 10 mg, Oral, Daily    famotidine (PEPCID) 20 mg, Oral, 2 times daily PRN    losartan (COZAAR) 50 mg, Daily    meclizine (ANTIVERT) 25 mg, Oral, 3 times daily PRN    metFORMIN (GLUCOPHAGE) 500 mg, Oral    valACYclovir (VALTREX) 1,000 mg, Oral, 3 times daily        Review of patient's allergies indicates:   Allergen Reactions    Neosporin [benzalkonium chloride]      Rash         Review of Systems   Constitutional: Negative for fever and malaise/fatigue.   Eyes:  Negative for blurred vision.   Cardiovascular:  Negative for chest pain.   Respiratory:  Negative for shortness of breath.    Skin:  Negative for poor wound healing.   Musculoskeletal:  Positive for arthritis, joint pain and myalgias.   Genitourinary:  Negative for bladder incontinence.   Neurological:  Negative for dizziness, numbness and paresthesias.  "  Psychiatric/Behavioral:  Negative for altered mental status.        The patient's relevant past medical, surgical, and social history was reviewed in Epic.       Objective:      VITAL SIGNS: Ht 5' 1" (1.549 m)   Wt 64.4 kg (141 lb 15.6 oz)   BMI 26.83 kg/m²     General    Nursing note and vitals reviewed.  Constitutional: She is oriented to person, place, and time. She appears well-developed and well-nourished.   Neurological: She is alert and oriented to person, place, and time.     General Musculoskeletal Exam   Gait: antalgic       Right Knee Exam     Tenderness   The patient is tender to palpation of the medial joint line.    Range of Motion   Extension:  5   Flexion:  100     Tests   Ligament Examination   Lachman: normal (-1 to 2mm)     Left Knee Exam     Tenderness   The patient tender to palpation of the medial joint line.    Range of Motion   Extension:  5   Flexion:  100     Tests   Stability   Lachman: normal (-1 to 2mm)     Muscle Strength   Right Lower Extremity   Quadriceps:  5/5   Hamstrin/5   Left Lower Extremity   Quadriceps:  5/5   Hamstrin/5        Imaging          Assessment:       Kristine Ortiz is a 64 y.o. female seen in the office today for   1. Primary osteoarthritis of right knee    2. Primary osteoarthritis of left knee     Bilateral knee bone-on-bone osteoarthritis.  Discussed other injection options.  She would like to proceed with Zilretta long-acting Kenalog injections.  Will need to get authorization first.  Toradol given today for acute pain.  She will return once the Zilretta is approved.  Celebrex prescription sent to pharmacy.  Discontinue ibuprofen at this time.  Recommend knee sleeves when working.      Plan:       Kristine was seen today for pain and pain.    Diagnoses and all orders for this visit:    Primary osteoarthritis of right knee  -     Prior authorization Order  -     Large Joint Aspiration/Injection: bilateral knee  -     celecoxib (CELEBREX) 200 MG capsule; " Take 1 capsule (200 mg total) by mouth 2 (two) times daily.    Primary osteoarthritis of left knee  -     Prior authorization Order  -     Large Joint Aspiration/Injection: bilateral knee  -     celecoxib (CELEBREX) 200 MG capsule; Take 1 capsule (200 mg total) by mouth 2 (two) times daily.    Zilretta prior auth  Bilateral knee toradol injections given today  Celebrex rx  Knee sleeve  Follow up when zilretta is approved.      Diagnoses and plan discussed with the patient, as well as the expected course and duration of his symptoms.  All questions and concerns were addressed prior to the end of the visit.   Instructed patient to call office if they have any future questions/concerns or to schedule apt. Patient will return to see me if symptoms worsen or fail to improve    Note dictated with voice recognition software, please excuse any grammatical errors.        Kelly Peralta PA-C      Department of Orthopedic Surgery  [facility]  Office: [phone]  03/27/2025

## 2025-03-31 ENCOUNTER — TELEPHONE (OUTPATIENT)
Dept: ORTHOPEDICS | Facility: CLINIC | Age: 65
End: 2025-03-31
Payer: COMMERCIAL

## 2025-03-31 NOTE — TELEPHONE ENCOUNTER
Spoke with patient.  [facility] exchange is out of network with [facility].  Patient states it should be under [place].  Gave her number to billing office and informed her to have it changed and we can then resubmit for PA for her Zilretta.

## 2025-04-01 ENCOUNTER — TELEPHONE (OUTPATIENT)
Dept: ORTHOPEDICS | Facility: CLINIC | Age: 65
End: 2025-04-01
Payer: COMMERCIAL

## 2025-04-01 NOTE — TELEPHONE ENCOUNTER
----- Message from Hannah sent at 4/1/2025 12:36 PM CDT -----  Type:  Needs Medical AdviceWho Called: Keira Call Back Number: [phone] Information: Patient is requesting a call back from nurse

## 2025-04-03 ENCOUNTER — TELEPHONE (OUTPATIENT)
Dept: ORTHOPEDICS | Facility: CLINIC | Age: 65
End: 2025-04-03
Payer: COMMERCIAL

## 2025-04-03 NOTE — TELEPHONE ENCOUNTER
----- Message from Kim sent at 4/3/2025 10:32 AM CDT -----  Regarding: Call back  Contact: [phone]  Who Called: PT Patient called in requesting to speak with you. Did not specify why. Please advise.

## 2025-04-03 NOTE — TELEPHONE ENCOUNTER
Patient requesting to have her Zilretta PA go thru [employer] and not [facility].  Notified PA department to do so.

## 2025-04-04 ENCOUNTER — TELEPHONE (OUTPATIENT)
Dept: ORTHOPEDICS | Facility: CLINIC | Age: 65
End: 2025-04-04
Payer: COMMERCIAL

## 2025-04-04 NOTE — TELEPHONE ENCOUNTER
Spoke to patient again regarding her insurance. [employer] is still not showing as her primary insurance and the PA was canceled. Informed her to call registration or come in to change.  Will resubmit PA once done.  HAMZAH.

## 2025-04-07 ENCOUNTER — TELEPHONE (OUTPATIENT)
Dept: ORTHOPEDICS | Facility: CLINIC | Age: 65
End: 2025-04-07
Payer: MEDICAID

## 2025-04-07 NOTE — TELEPHONE ENCOUNTER
LVM for patient that Zilretta was denied by [facility] insurance also.  Appointment for Thursday canceled.

## 2025-05-01 ENCOUNTER — TELEPHONE (OUTPATIENT)
Dept: ORTHOPEDICS | Facility: CLINIC | Age: 65
End: 2025-05-01
Payer: MEDICAID

## 2025-05-01 DIAGNOSIS — G89.29 CHRONIC PAIN OF BOTH KNEES: Primary | ICD-10-CM

## 2025-05-01 DIAGNOSIS — M25.562 CHRONIC PAIN OF BOTH KNEES: Primary | ICD-10-CM

## 2025-05-01 DIAGNOSIS — M25.561 CHRONIC PAIN OF BOTH KNEES: Primary | ICD-10-CM

## 2025-05-01 NOTE — TELEPHONE ENCOUNTER
Spoke with patient. Informed her that Bilateral Iovera has been ordered and needs to be approved by insurance.  Appointment made for May 13.  Will notify her if insurance does not approve.

## 2025-05-01 NOTE — TELEPHONE ENCOUNTER
----- Message from Terrence sent at 5/1/2025  9:35 AM CDT -----  Regarding: pt  Name of Who is Calling:Pt What is the request in detail: Requesting callback in regards to injection pt inquiring if she's able to schedule Can the clinic reply by [facility]: no What Number to Call Back if not [facility]:Telephone Information:InMy[facility]          [phone]

## 2025-05-12 ENCOUNTER — TELEPHONE (OUTPATIENT)
Dept: ORTHOPEDICS | Facility: CLINIC | Age: 65
End: 2025-05-12
Payer: MEDICAID

## 2025-05-12 NOTE — TELEPHONE ENCOUNTER
Spoke to patient. Informed her that Iovera is still pending insurance. Stated she would like a steroid injection until approved. Informed her that per H.Peralta, she can get Toradol for now.  Will come tomorrow for Toradol.

## 2025-05-13 ENCOUNTER — TELEPHONE (OUTPATIENT)
Dept: ORTHOPEDICS | Facility: CLINIC | Age: 65
End: 2025-05-13

## 2025-05-13 ENCOUNTER — OFFICE VISIT (OUTPATIENT)
Dept: ORTHOPEDICS | Facility: CLINIC | Age: 65
End: 2025-05-13
Payer: MEDICAID

## 2025-05-13 VITALS — WEIGHT: 146.63 LBS | BODY MASS INDEX: 27.68 KG/M2 | HEIGHT: 61 IN

## 2025-05-13 DIAGNOSIS — M17.12 PRIMARY OSTEOARTHRITIS OF LEFT KNEE: ICD-10-CM

## 2025-05-13 DIAGNOSIS — M17.11 PRIMARY OSTEOARTHRITIS OF RIGHT KNEE: Primary | ICD-10-CM

## 2025-05-13 PROCEDURE — 99499 UNLISTED E&M SERVICE: CPT | Mod: 25,S$PBB,, | Performed by: PHYSICIAN ASSISTANT

## 2025-05-13 PROCEDURE — 20610 DRAIN/INJ JOINT/BURSA W/O US: CPT | Mod: 50,PBBFAC,PN | Performed by: PHYSICIAN ASSISTANT

## 2025-05-13 PROCEDURE — 99999 PR PBB SHADOW E&M-EST. PATIENT-LVL III: CPT | Mod: PBBFAC,,, | Performed by: PHYSICIAN ASSISTANT

## 2025-05-13 PROCEDURE — 99213 OFFICE O/P EST LOW 20 MIN: CPT | Mod: PBBFAC,PN | Performed by: PHYSICIAN ASSISTANT

## 2025-05-13 PROCEDURE — 20610 DRAIN/INJ JOINT/BURSA W/O US: CPT | Mod: 50,S$PBB,, | Performed by: PHYSICIAN ASSISTANT

## 2025-05-13 PROCEDURE — 99999PBSHW PR PBB SHADOW TECHNICAL ONLY FILED TO HB: Mod: JZ,PBBFAC,,

## 2025-05-13 RX ORDER — KETOROLAC TROMETHAMINE 30 MG/ML
30 INJECTION, SOLUTION INTRAMUSCULAR; INTRAVENOUS
Status: DISCONTINUED | OUTPATIENT
Start: 2025-05-13 | End: 2025-05-13 | Stop reason: HOSPADM

## 2025-05-13 RX ADMIN — KETOROLAC TROMETHAMINE 30 MG: 30 INJECTION, SOLUTION INTRAMUSCULAR at 11:05

## 2025-05-13 NOTE — TELEPHONE ENCOUNTER
----- Message from Kit sent at 5/13/2025 12:57 PM CDT -----  Contact: pt  Type: Requesting to speak with nurseTorreso Called: PTRegapatriciaing: would like to speak to ELVIS MILES regarding her knee. Would the patient rather a call back or a response via [facility]? Call [facility] Call Back Number:  [phone] Additional Information:

## 2025-05-13 NOTE — PROGRESS NOTES
"Subjective:      Chief Complaint: Pain of the Left Knee and Pain of the Right Knee    Patient ID: Kristine Ortiz is a 64 y.o. female.  Here for bilateral knee toradol injections. Iovera still pending with insurance.         Past Medical History:   Diagnosis Date    Arthritis     Diabetes mellitus     High cholesterol     Hypertension         Past Surgical History:   Procedure Laterality Date    c cestion      KNEE ARTHROPLASTY          Current Outpatient Medications   Medication Instructions    albuterol (PROVENTIL/VENTOLIN HFA) 90 mcg/actuation inhaler 1-2 puffs, Inhalation, Every 6 hours PRN, Rescue    amLODIPine (NORVASC) 10 mg, Daily    atorvastatin (LIPITOR) 40 mg, Nightly    celecoxib (CELEBREX) 200 mg, Oral, 2 times daily    cetirizine (ZYRTEC) 10 mg, Oral, Daily    famotidine (PEPCID) 20 mg, Oral, 2 times daily PRN    losartan (COZAAR) 50 mg, Daily    meclizine (ANTIVERT) 25 mg, Oral, 3 times daily PRN    metFORMIN (GLUCOPHAGE) 500 mg, Oral    valACYclovir (VALTREX) 1,000 mg, Oral, 3 times daily        Review of patient's allergies indicates:   Allergen Reactions    Neosporin [benzalkonium chloride]      Rash         ROS    The patient's relevant past medical, surgical, and social history was reviewed in Epic.       Objective:      VITAL SIGNS: Ht 5' 1" (1.549 m)   Wt 66.5 kg (146 lb 9.7 oz)   BMI 27.70 kg/m²     Ortho/SPM Exam     Imaging          Assessment:       Kristine Ortiz is a 64 y.o. female seen in the office today for   1. Primary osteoarthritis of right knee    2. Primary osteoarthritis of left knee           Plan:       Kristine was seen today for pain and pain.    Diagnoses and all orders for this visit:    Primary osteoarthritis of right knee  -     Large Joint Aspiration/Injection: bilateral knee    Primary osteoarthritis of left knee  -     Large Joint Aspiration/Injection: bilateral knee          Diagnoses and plan discussed with the patient, as well as the expected course and duration of his " symptoms.  All questions and concerns were addressed prior to the end of the visit.   Instructed patient to call office if they have any future questions/concerns or to schedule apt. Patient will return to see me if symptoms worsen or fail to improve    Note dictated with voice recognition software, please excuse any grammatical errors.        Kelly Peralta PA-C      Department of Orthopedic Surgery  [facility]  Office: [phone]  05/13/2025

## 2025-05-13 NOTE — TELEPHONE ENCOUNTER
Spoke with patient. She said she spoke with [employer] and they said we did not give them enough information for the Iovera to be approved. Will send message to PA department.

## 2025-05-13 NOTE — PROCEDURES
Large Joint Aspiration/Injection: bilateral knee    Date/Time: 5/13/2025 11:00 AM    Performed by: Kelly Peralta PA-C  Authorized by: Kelly Peralta PA-C    Consent Done?:  Yes (Verbal)  Indications:  Arthritis  Site marked: the procedure site was marked    Timeout: prior to procedure the correct patient, procedure, and site was verified    Prep: patient was prepped and draped in usual sterile fashion      Local anesthesia used?: Yes    Local anesthetic:  Topical anesthetic    Details:  Needle Size:  22 G  Ultrasonic Guidance for needle placement?: No    Approach:  Anterolateral  Location:  Knee  Laterality:  Bilateral  Site:  Bilateral knee  Medications (Right):  30 mg ketorolac 60 mg/2 mL  Medications (Left):  30 mg ketorolac 60 mg/2 mL  Patient tolerance:  Patient tolerated the procedure well with no immediate complications

## 2025-05-20 ENCOUNTER — TELEPHONE (OUTPATIENT)
Dept: ORTHOPEDICS | Facility: CLINIC | Age: 65
End: 2025-05-20
Payer: MEDICAID

## 2025-05-20 NOTE — TELEPHONE ENCOUNTER
----- Message from Bridget sent at 5/20/2025 10:42 AM CDT -----  Type:  Needs Medical AdviceWho Called: ptWould the patient rather a call back or a response via [facility]? [facility]Best Call Back Number:  [phone] Information: pt asking for call from nurse regarding injection

## 2025-05-29 NOTE — ED PROVIDER NOTES
Encounter Date: 10/9/2023       History     Chief Complaint   Patient presents with    Headache     Patient presents w/ a c/o of headache for 'months' now. States similar present of headache in the past. States that ever since she hit her head on the freezer a while back she has been having these headaches. 9/10. No OTC meds PTA. GCS 15. VSS.     62 y.o. female Past Medical History:  No date: Arthritis  No date: Diabetes mellitus  No date: High cholesterol  No date: Hypertension     Here for evaluation of frequent headaches. Pt notes that 9 months ago hit head without loc and since then has been getting frequent headaches. Denies focal neuro complaints. Not on blood thinners.      Review of patient's allergies indicates:   Allergen Reactions    Neosporin [benzalkonium chloride]      Rash       Past Medical History:   Diagnosis Date    Arthritis     Diabetes mellitus     High cholesterol     Hypertension      Past Surgical History:   Procedure Laterality Date    c cestion      KNEE ARTHROPLASTY       No family history on file.  Social History     Tobacco Use    Smoking status: Every Day     Current packs/day: 0.50     Types: Cigarettes    Smokeless tobacco: Never   Substance Use Topics    Alcohol use: Not Currently    Drug use: Not Currently     Review of Systems   Constitutional:  Negative for fever.   HENT:  Negative for sore throat.    Respiratory:  Negative for shortness of breath.    Cardiovascular:  Negative for chest pain.   Gastrointestinal:  Negative for nausea.   Genitourinary:  Negative for dysuria.   Musculoskeletal:  Negative for back pain.   Skin:  Negative for rash.   Neurological:  Negative for weakness.   Hematological:  Does not bruise/bleed easily.   All other systems reviewed and are negative.      Physical Exam     Initial Vitals [10/09/23 0733]   BP Pulse Resp Temp SpO2   (!) 165/94 83 20 97.5 °F (36.4 °C) 100 %      MAP       --         Physical Exam    Nursing note and vitals  Follow-up with GI  Rising LFTs and bilirubin levels   reviewed.  Constitutional: She appears well-developed and well-nourished.   HENT:   Head: Normocephalic and atraumatic.   Eyes: Conjunctivae and EOM are normal. Pupils are equal, round, and reactive to light.   Neck:   Normal range of motion.  Cardiovascular:  Normal rate.           Pulmonary/Chest: No respiratory distress.   Abdominal: She exhibits no distension.   Musculoskeletal:         General: Normal range of motion.      Cervical back: Normal range of motion.     Neurological: She is alert. No cranial nerve deficit. GCS score is 15. GCS eye subscore is 4. GCS verbal subscore is 5. GCS motor subscore is 6.   Skin: Skin is warm and dry.   Psychiatric: She has a normal mood and affect. Thought content normal.     Cn 2-12 intact  Nl gait/balance    ED Course   Procedures  Labs Reviewed - No data to display       Imaging Results    None          Medications - No data to display  Medical Decision Making                 I have reviewed head ct from 3 weeks ago. Will discharge on fioricet and meclizine and refer to neuro            Clinical Impression:   Final diagnoses:  [G43.909] Migraine without status migrainosus, not intractable, unspecified migraine type (Primary)        ED Disposition Condition    Discharge Stable          ED Prescriptions    None       Follow-up Information       Follow up With Specialties Details Why Contact Info    Daron Slaughter MD General Practice   [address] [place] [address]  Lorie NAZARIO [id number]  [phone]               Ana Max MD  10/09/23 2262

## 2025-06-02 ENCOUNTER — TELEPHONE (OUTPATIENT)
Dept: ORTHOPEDICS | Facility: CLINIC | Age: 65
End: 2025-06-02
Payer: MEDICAID

## 2025-06-03 ENCOUNTER — OFFICE VISIT (OUTPATIENT)
Dept: ORTHOPEDICS | Facility: CLINIC | Age: 65
End: 2025-06-03
Payer: MEDICAID

## 2025-06-03 VITALS — BODY MASS INDEX: 26.02 KG/M2 | HEIGHT: 61 IN | WEIGHT: 137.81 LBS

## 2025-06-03 DIAGNOSIS — M17.11 PRIMARY OSTEOARTHRITIS OF RIGHT KNEE: Primary | ICD-10-CM

## 2025-06-03 DIAGNOSIS — M25.562 CHRONIC PAIN OF BOTH KNEES: ICD-10-CM

## 2025-06-03 DIAGNOSIS — M25.561 CHRONIC PAIN OF BOTH KNEES: ICD-10-CM

## 2025-06-03 DIAGNOSIS — G89.29 CHRONIC PAIN OF BOTH KNEES: ICD-10-CM

## 2025-06-03 DIAGNOSIS — M17.12 PRIMARY OSTEOARTHRITIS OF LEFT KNEE: ICD-10-CM

## 2025-06-03 PROCEDURE — 20610 DRAIN/INJ JOINT/BURSA W/O US: CPT | Mod: 50,PBBFAC,PN | Performed by: PHYSICIAN ASSISTANT

## 2025-06-03 PROCEDURE — 20610 DRAIN/INJ JOINT/BURSA W/O US: CPT | Mod: 50,S$PBB,, | Performed by: PHYSICIAN ASSISTANT

## 2025-06-03 PROCEDURE — 99499 UNLISTED E&M SERVICE: CPT | Mod: 25,S$PBB,, | Performed by: PHYSICIAN ASSISTANT

## 2025-06-03 PROCEDURE — 99999 PR PBB SHADOW E&M-EST. PATIENT-LVL III: CPT | Mod: PBBFAC,,, | Performed by: PHYSICIAN ASSISTANT

## 2025-06-03 PROCEDURE — 99213 OFFICE O/P EST LOW 20 MIN: CPT | Mod: PBBFAC,PN | Performed by: PHYSICIAN ASSISTANT

## 2025-06-03 PROCEDURE — 99999PBSHW PR PBB SHADOW TECHNICAL ONLY FILED TO HB: Mod: PBBFAC,,,

## 2025-06-03 RX ORDER — TRIAMCINOLONE ACETONIDE 40 MG/ML
40 INJECTION, SUSPENSION INTRA-ARTICULAR; INTRAMUSCULAR
Status: DISCONTINUED | OUTPATIENT
Start: 2025-06-03 | End: 2025-06-03 | Stop reason: HOSPADM

## 2025-06-03 RX ADMIN — TRIAMCINOLONE ACETONIDE 40 MG: 40 INJECTION, SUSPENSION INTRA-ARTICULAR; INTRAMUSCULAR at 08:06

## 2025-07-09 ENCOUNTER — HOSPITAL ENCOUNTER (EMERGENCY)
Facility: HOSPITAL | Age: 65
Discharge: HOME | End: 2025-07-09
Attending: INTERNAL MEDICINE
Payer: MEDICAID

## 2025-07-09 VITALS
HEIGHT: 61 IN | TEMPERATURE: 99 F | OXYGEN SATURATION: 98 % | BODY MASS INDEX: 24.92 KG/M2 | WEIGHT: 132 LBS | HEART RATE: 88 BPM | DIASTOLIC BLOOD PRESSURE: 80 MMHG | RESPIRATION RATE: 16 BRPM | SYSTOLIC BLOOD PRESSURE: 155 MMHG

## 2025-07-09 DIAGNOSIS — T63.441A BEE STING, ACCIDENTAL OR UNINTENTIONAL, INITIAL ENCOUNTER: Primary | ICD-10-CM

## 2025-07-09 PROCEDURE — 99283 EMERGENCY DEPT VISIT LOW MDM: CPT | Mod: ER

## 2025-07-09 RX ORDER — MUPIROCIN 20 MG/G
OINTMENT TOPICAL 3 TIMES DAILY
Qty: 15 G | Refills: 0 | Status: SHIPPED | OUTPATIENT
Start: 2025-07-09

## 2025-07-10 NOTE — ED PROVIDER NOTES
Encounter Date: 7/9/2025    SCRIBE #1 NOTE: I, Magdalena Torre, am scribing for, and in the presence of,  Reddy Crawford PA-C. I have scribed the following portions of the note - Other sections scribed: HPI,ROS,PE.       History     Chief Complaint   Patient presents with    Insect Bite     Pt was stung by bee on the right side of her back twice PTA     Patient is a 64 y.o. female with a past medical history of hypertension, hyperlipidemia, diabetes, arthritis who presents to the Emergency Department for evaluation of  bee sting to right flank and right rib x 1.5 hours prior to arrival .She reports this is the first time she was stung by a bee and wanted to rule out allergic reaction. States she has not looked at the area. Denies any known bee allergy. Reports compliance with her prescribed medications. She has not taken any medications for the symptoms.   She denies fever, chills. Denies chest pain, shortness of breath, wheezes, chest tightness. Denies abdominal pain, nausea, or vomiting. Denies cough, congestion, sore throat, difficulty swallowing.          The history is provided by the patient. No  was used.     Review of patient's allergies indicates:   Allergen Reactions    Neosporin [benzalkonium chloride]      Rash       Past Medical History:   Diagnosis Date    Arthritis     Diabetes mellitus     High cholesterol     Hypertension      Past Surgical History:   Procedure Laterality Date    c cestion      KNEE ARTHROPLASTY       No family history on file.  Social History[1]  Review of Systems   Constitutional:  Negative for chills and fever.   HENT:  Negative for sore throat and trouble swallowing.    Eyes:  Negative for visual disturbance.   Respiratory:  Negative for chest tightness, shortness of breath and wheezing.    Cardiovascular:  Negative for chest pain.   Gastrointestinal:  Negative for abdominal pain, nausea and vomiting.   Genitourinary:  Negative for difficulty urinating.    Musculoskeletal:  Negative for back pain.   Skin:  Positive for wound (bee sting to right flank and rib). Negative for rash.   Neurological:  Negative for headaches.       Physical Exam     Initial Vitals [07/09/25 1847]   BP Pulse Resp Temp SpO2   (!) 149/87 85 17 98.3 °F (36.8 °C) 99 %      MAP       --         Physical Exam    Nursing note and vitals reviewed.  Constitutional: She appears well-developed and well-nourished.   HENT:   Head: Normocephalic and atraumatic.   Right Ear: External ear normal.   Left Ear: External ear normal.   Neck: Carotid bruit is not present.   Normal range of motion.  Cardiovascular:  Normal rate, regular rhythm, normal heart sounds and intact distal pulses.     Exam reveals no gallop and no friction rub.       No murmur heard.  Pulmonary/Chest: Breath sounds normal. No respiratory distress. She has no wheezes. She has no rhonchi. She has no rales.   Abdominal: Abdomen is soft. Bowel sounds are normal. She exhibits no distension. There is no abdominal tenderness. There is no rebound and no guarding.   Musculoskeletal:         General: Normal range of motion.      Cervical back: Normal range of motion.     Neurological: She is alert and oriented to person, place, and time. GCS score is 15. GCS eye subscore is 4. GCS verbal subscore is 5. GCS motor subscore is 6.   Skin:   There is a puncture wound from reported bee sting to right flank and right rib with mild surrounding erythema.  No retained stinger in place. No purulence present or overlying tenderness.    Psychiatric: She has a normal mood and affect.         ED Course   Procedures  Labs Reviewed - No data to display       Imaging Results    None          Medications - No data to display  Medical Decision Making  This is an emergent evaluation of a 64 y.o. female with a past medical history of hypertension, hyperlipidemia, diabetes, arthritis who presents to the Emergency Department for evaluation of  bee sting to right flank  and right rib x 1.5 hours prior to arrival.    Physical exam as above.    Differential diagnosis includes but is not limited to insect bite, bee sting, allergic reaction, cellulitis, other infection.    Vital signs, chart, labs, and/or imaging were all reviewed.  See ED course below and interpretations above. My overall impression is be sting. Will discharge home with mupirocin. Vital signs are reassuring. Patient/Caregiver is stable for discharge at this time.  Patient/Caregiver was informed of results, plan of care, and are comfortable with this.  All questions and concerns were addressed. Discussed strict return precautions with the patient/caregiver. Instructed follow up with primary care provider within 1 week.      Reddy Crawford PA-C    DISCLAIMER: This note was prepared with [employer] voice recognition transcription software. Garbled syntax, mangled pronouns, and other bizarre constructions may be attributed to that software system.     Risk  Prescription drug management.            Scribe Attestation:   Scribe #1: I performed the above scribed service and the documentation accurately describes the services I performed. I attest to the accuracy of the note.        ED Course as of 07/09/25 2042 Wed Jul 09, 2025 2003 BP(!): 149/87 [TM]   2003 Temp: 98.3 °F (36.8 °C) [TM]   2003 Pulse: 85 [TM]   2003 Resp: 17 [TM]   2003 SpO2: 99 % [TM]      ED Course User Index  [TM] Reddy Crawford PA-C                           Clinical Impression:  Final diagnoses:  [T63.441A] Bee sting, accidental or unintentional, initial encounter (Primary)          ED Disposition Condition    Discharge Stable          ED Prescriptions       Medication Sig Dispense Start Date End Date Auth. Provider    mupirocin (BACTROBAN) 2 % ointment Apply topically 3 (three) times daily. 15 g 7/9/2025 -- Reddy Crawford PA-C          Follow-up Information       Follow up With Specialties Details Why Contact Ramandeep Melo [facility]  Emergency Medicine Go to  As needed, If symptoms worsen, or new symptoms develop [address]  [facility] [address] [phone]    Primary care doctor  Schedule an appointment as soon as possible for a visit in 3 days              I, Reddy Crawford PA-C, personally performed the services described in this documentation. All medical record entries made by the scribe were at my direction and in my presence. I have reviewed the chart and agree that the record reflects my personal performance and is accurate and complete.      DISCLAIMER: This note was prepared with [employer] voice recognition transcription software. Garbled syntax, mangled pronouns, and other bizarre constructions may be attributed to that software system.         [1]   Social History  Tobacco Use    Smoking status: Every Day     Current packs/day: 0.50     Types: Cigarettes    Smokeless tobacco: Never   Substance Use Topics    Alcohol use: Not Currently    Drug use: Not Currently        Reddy Crawford PA-C  07/09/25 4970